# Patient Record
Sex: FEMALE | Race: WHITE | NOT HISPANIC OR LATINO
[De-identification: names, ages, dates, MRNs, and addresses within clinical notes are randomized per-mention and may not be internally consistent; named-entity substitution may affect disease eponyms.]

---

## 2017-12-07 ENCOUNTER — APPOINTMENT (OUTPATIENT)
Dept: PULMONOLOGY | Facility: CLINIC | Age: 82
End: 2017-12-07
Payer: MEDICARE

## 2017-12-07 VITALS
HEART RATE: 73 BPM | WEIGHT: 150 LBS | TEMPERATURE: 97.9 F | SYSTOLIC BLOOD PRESSURE: 136 MMHG | DIASTOLIC BLOOD PRESSURE: 74 MMHG | OXYGEN SATURATION: 100 % | BODY MASS INDEX: 25.75 KG/M2

## 2017-12-07 DIAGNOSIS — R06.02 SHORTNESS OF BREATH: ICD-10-CM

## 2017-12-07 PROCEDURE — 99214 OFFICE O/P EST MOD 30 MIN: CPT | Mod: 25

## 2017-12-07 PROCEDURE — 94727 GAS DIL/WSHOT DETER LNG VOL: CPT

## 2017-12-07 PROCEDURE — 94060 EVALUATION OF WHEEZING: CPT

## 2017-12-07 PROCEDURE — 94729 DIFFUSING CAPACITY: CPT

## 2017-12-07 RX ORDER — ROSUVASTATIN CALCIUM 5 MG/1
5 TABLET, FILM COATED ORAL
Qty: 90 | Refills: 0 | Status: ACTIVE | COMMUNITY
Start: 2017-03-09

## 2017-12-13 ENCOUNTER — APPOINTMENT (OUTPATIENT)
Dept: THORACIC SURGERY | Facility: CLINIC | Age: 82
End: 2017-12-13
Payer: MEDICARE

## 2017-12-13 VITALS
HEART RATE: 63 BPM | SYSTOLIC BLOOD PRESSURE: 118 MMHG | DIASTOLIC BLOOD PRESSURE: 73 MMHG | OXYGEN SATURATION: 97 % | RESPIRATION RATE: 18 BRPM

## 2017-12-13 PROCEDURE — 99215 OFFICE O/P EST HI 40 MIN: CPT

## 2018-01-10 ENCOUNTER — OUTPATIENT (OUTPATIENT)
Dept: OUTPATIENT SERVICES | Facility: HOSPITAL | Age: 83
LOS: 1 days | End: 2018-01-10
Payer: MEDICARE

## 2018-01-10 VITALS
WEIGHT: 147.93 LBS | RESPIRATION RATE: 16 BRPM | HEART RATE: 57 BPM | DIASTOLIC BLOOD PRESSURE: 78 MMHG | OXYGEN SATURATION: 99 % | SYSTOLIC BLOOD PRESSURE: 132 MMHG | HEIGHT: 63 IN | TEMPERATURE: 97 F

## 2018-01-10 DIAGNOSIS — R73.03 PREDIABETES: ICD-10-CM

## 2018-01-10 DIAGNOSIS — Z98.890 OTHER SPECIFIED POSTPROCEDURAL STATES: Chronic | ICD-10-CM

## 2018-01-10 DIAGNOSIS — I10 ESSENTIAL (PRIMARY) HYPERTENSION: ICD-10-CM

## 2018-01-10 DIAGNOSIS — R91.1 SOLITARY PULMONARY NODULE: ICD-10-CM

## 2018-01-10 DIAGNOSIS — Z98.49 CATARACT EXTRACTION STATUS, UNSPECIFIED EYE: Chronic | ICD-10-CM

## 2018-01-10 DIAGNOSIS — F03.90 UNSPECIFIED DEMENTIA WITHOUT BEHAVIORAL DISTURBANCE: ICD-10-CM

## 2018-01-10 DIAGNOSIS — Z90.2 ACQUIRED ABSENCE OF LUNG [PART OF]: Chronic | ICD-10-CM

## 2018-01-10 LAB
ALBUMIN SERPL ELPH-MCNC: 4.1 G/DL — SIGNIFICANT CHANGE UP (ref 3.3–5)
ALP SERPL-CCNC: 99 U/L — SIGNIFICANT CHANGE UP (ref 40–120)
ALT FLD-CCNC: 31 U/L — SIGNIFICANT CHANGE UP (ref 4–33)
AST SERPL-CCNC: 26 U/L — SIGNIFICANT CHANGE UP (ref 4–32)
BILIRUB SERPL-MCNC: 0.4 MG/DL — SIGNIFICANT CHANGE UP (ref 0.2–1.2)
BLD GP AB SCN SERPL QL: NEGATIVE — SIGNIFICANT CHANGE UP
BUN SERPL-MCNC: 25 MG/DL — HIGH (ref 7–23)
CALCIUM SERPL-MCNC: 9.9 MG/DL — SIGNIFICANT CHANGE UP (ref 8.4–10.5)
CHLORIDE SERPL-SCNC: 103 MMOL/L — SIGNIFICANT CHANGE UP (ref 98–107)
CO2 SERPL-SCNC: 26 MMOL/L — SIGNIFICANT CHANGE UP (ref 22–31)
CREAT SERPL-MCNC: 0.86 MG/DL — SIGNIFICANT CHANGE UP (ref 0.5–1.3)
GLUCOSE SERPL-MCNC: 81 MG/DL — SIGNIFICANT CHANGE UP (ref 70–99)
HBA1C BLD-MCNC: 5.9 % — HIGH (ref 4–5.6)
HCT VFR BLD CALC: 43 % — SIGNIFICANT CHANGE UP (ref 34.5–45)
HGB BLD-MCNC: 14.5 G/DL — SIGNIFICANT CHANGE UP (ref 11.5–15.5)
MCHC RBC-ENTMCNC: 33.7 % — SIGNIFICANT CHANGE UP (ref 32–36)
MCHC RBC-ENTMCNC: 33.7 PG — SIGNIFICANT CHANGE UP (ref 27–34)
MCV RBC AUTO: 100 FL — SIGNIFICANT CHANGE UP (ref 80–100)
NRBC # FLD: 0 — SIGNIFICANT CHANGE UP
PLATELET # BLD AUTO: 174 K/UL — SIGNIFICANT CHANGE UP (ref 150–400)
PMV BLD: 10.9 FL — SIGNIFICANT CHANGE UP (ref 7–13)
POTASSIUM SERPL-MCNC: 3.8 MMOL/L — SIGNIFICANT CHANGE UP (ref 3.5–5.3)
POTASSIUM SERPL-SCNC: 3.8 MMOL/L — SIGNIFICANT CHANGE UP (ref 3.5–5.3)
PROT SERPL-MCNC: 7.1 G/DL — SIGNIFICANT CHANGE UP (ref 6–8.3)
RBC # BLD: 4.3 M/UL — SIGNIFICANT CHANGE UP (ref 3.8–5.2)
RBC # FLD: 12.9 % — SIGNIFICANT CHANGE UP (ref 10.3–14.5)
RH IG SCN BLD-IMP: POSITIVE — SIGNIFICANT CHANGE UP
SODIUM SERPL-SCNC: 141 MMOL/L — SIGNIFICANT CHANGE UP (ref 135–145)
WBC # BLD: 5.28 K/UL — SIGNIFICANT CHANGE UP (ref 3.8–10.5)
WBC # FLD AUTO: 5.28 K/UL — SIGNIFICANT CHANGE UP (ref 3.8–10.5)

## 2018-01-10 PROCEDURE — 93010 ELECTROCARDIOGRAM REPORT: CPT

## 2018-01-10 RX ORDER — SODIUM CHLORIDE 9 MG/ML
1000 INJECTION, SOLUTION INTRAVENOUS
Qty: 0 | Refills: 0 | Status: DISCONTINUED | OUTPATIENT
Start: 2018-01-17 | End: 2018-01-18

## 2018-01-10 NOTE — H&P PST ADULT - NSANTHOSAYNRD_GEN_A_CORE
No. LAURY screening performed.  STOP BANG Legend: 0-2 = LOW Risk; 3-4 = INTERMEDIATE Risk; 5-8 = HIGH Risk

## 2018-01-10 NOTE — H&P PST ADULT - PROBLEM SELECTOR PLAN 3
Instructed to take amlodipine AM of surgery with a sip of water. Patient was evaluated by cardiologist. Will obtain cardiac evaluation from Dr. Mercer.

## 2018-01-10 NOTE — H&P PST ADULT - PSH
History of back surgery  1990s  History of cataract surgery  bilateral  S/P lobectomy of lung  s/p FB, mediastinoscopy, LT VATS, COLEMAN lobectomy 8/2009

## 2018-01-10 NOTE — H&P PST ADULT - PROBLEM SELECTOR PLAN 1
Scheduled for right video assisted thoracoscopy, robotic assisted right upper lobe wedge resection, possible right upper lobe segmentectomy, mediastinal lymph node dissection on 1/17/2018. labs done and results pending. Surgical scrub & preop instruction given and explained. Famotidine provided with instruction. Verbalized understanding.

## 2018-01-10 NOTE — H&P PST ADULT - PSYCHIATRIC DETAILS
Skin normal color for race, warm, dry and intact. erythematous non tender rash poster neck crease and bilateral antecubital arm crease non rasied  no lesions normal affect/normal behavior

## 2018-01-10 NOTE — H&P PST ADULT - PROBLEM SELECTOR PLAN 4
Patient accompanied by son Feliz Oneil. Surgical scrub & preop instruction given and explained to son. Verbalized understanding.

## 2018-01-10 NOTE — H&P PST ADULT - NEGATIVE MUSCULOSKELETAL SYMPTOMS
no stiffness/no neck pain/no muscle weakness/no back pain no joint swelling/no back pain/no stiffness/no neck pain/no muscle weakness

## 2018-01-10 NOTE — H&P PST ADULT - NEGATIVE GENERAL GENITOURINARY SYMPTOMS
no hematuria/no bladder infections/no dysuria/no flank pain R/no flank pain L/no urinary hesitancy/normal urinary frequency

## 2018-01-10 NOTE — H&P PST ADULT - ASSESSMENT
84 yo female with preop dx of malignant neoplasm of unspecified part of unspecified bronchus or lung, solitary pulmonary nodule

## 2018-01-10 NOTE — H&P PST ADULT - NEGATIVE ALLERGY TYPES
no reactions to food/no reactions to medicines/no outdoor environmental allergies/no indoor environmental allergies

## 2018-01-10 NOTE — H&P PST ADULT - HISTORY OF PRESENT ILLNESS
84 yo female with hx of HTN, HLD, dementia, lung CA presents to have PST evaluation for right video assisted thoracoscopy, robotic assisted right upper lobe wedge resection, possible right upper lobe segmentectomy, mediastinal lymph node dissection on 1/17/2018.  Patient reports hx of of left lung CA & right lung nodules, had TOPHER . Patient had f/u CT scan every 6 months, recent CT scan in 12/2017, showed right nodules increased in size, surgical intervention was recommended. 86 yo female with hx of HTN, HLD, dementia, lung CA presents to have PST evaluation for right video assisted thoracoscopy, robotic assisted right upper lobe wedge resection, possible right upper lobe segmentectomy, mediastinal lymph node dissection on 1/17/2018.  As per patient's son, patient has hx of of left lung CA (s/p L VATS, COLEMAN lobectomy 2009) & right lung nodules.  Patient had f/u CT scan every 6 months, recent CT scan in 12/2017, showed right nodules increased in size, surgical intervention was recommended.   Patient has dementia, accompanied by son.

## 2018-01-10 NOTE — H&P PST ADULT - PMH
Dementia    Hyperlipidemia    Hypertension    Lung cancer  left  Lung nodule  right  Prediabetes Dementia    Hyperlipidemia    Hypertension    Lung cancer  left  Lung nodule  right  Prediabetes  no med

## 2018-01-10 NOTE — H&P PST ADULT - NEGATIVE ENMT SYMPTOMS
no post-nasal discharge/no sinus symptoms/no nasal congestion/no nasal obstruction/no nasal discharge/no throat pain/no dysphagia/no tinnitus/no hearing difficulty/no ear pain

## 2018-01-10 NOTE — H&P PST ADULT - RESPIRATORY COMMENTS
preop dx of malignant neoplasm of unspecified part of unspecified bronchus or lung, solitary pulmonary nodule

## 2018-01-17 ENCOUNTER — APPOINTMENT (OUTPATIENT)
Dept: THORACIC SURGERY | Facility: HOSPITAL | Age: 83
End: 2018-01-17

## 2018-01-17 ENCOUNTER — INPATIENT (INPATIENT)
Facility: HOSPITAL | Age: 83
LOS: 1 days | Discharge: HOME HEALTH | End: 2018-01-19
Attending: THORACIC SURGERY (CARDIOTHORACIC VASCULAR SURGERY) | Admitting: THORACIC SURGERY (CARDIOTHORACIC VASCULAR SURGERY)
Payer: MEDICARE

## 2018-01-17 ENCOUNTER — RESULT REVIEW (OUTPATIENT)
Age: 83
End: 2018-01-17

## 2018-01-17 ENCOUNTER — TRANSCRIPTION ENCOUNTER (OUTPATIENT)
Age: 83
End: 2018-01-17

## 2018-01-17 VITALS
HEIGHT: 63 IN | WEIGHT: 147.93 LBS | TEMPERATURE: 98 F | DIASTOLIC BLOOD PRESSURE: 62 MMHG | RESPIRATION RATE: 16 BRPM | OXYGEN SATURATION: 99 % | SYSTOLIC BLOOD PRESSURE: 143 MMHG | HEART RATE: 57 BPM

## 2018-01-17 DIAGNOSIS — Z90.2 ACQUIRED ABSENCE OF LUNG [PART OF]: Chronic | ICD-10-CM

## 2018-01-17 DIAGNOSIS — Z98.890 OTHER SPECIFIED POSTPROCEDURAL STATES: Chronic | ICD-10-CM

## 2018-01-17 DIAGNOSIS — R91.1 SOLITARY PULMONARY NODULE: ICD-10-CM

## 2018-01-17 DIAGNOSIS — Z98.49 CATARACT EXTRACTION STATUS, UNSPECIFIED EYE: Chronic | ICD-10-CM

## 2018-01-17 LAB — RH IG SCN BLD-IMP: POSITIVE — SIGNIFICANT CHANGE UP

## 2018-01-17 PROCEDURE — 88332 PATH CONSLTJ SURG EA ADD BLK: CPT | Mod: 26

## 2018-01-17 PROCEDURE — 88342 IMHCHEM/IMCYTCHM 1ST ANTB: CPT | Mod: 26

## 2018-01-17 PROCEDURE — 71045 X-RAY EXAM CHEST 1 VIEW: CPT | Mod: 26

## 2018-01-17 PROCEDURE — 32652 THORACOSCOPY REM TOTL CORTEX: CPT | Mod: AS

## 2018-01-17 PROCEDURE — 88307 TISSUE EXAM BY PATHOLOGIST: CPT | Mod: 26

## 2018-01-17 PROCEDURE — 31622 DX BRONCHOSCOPE/WASH: CPT

## 2018-01-17 PROCEDURE — 32674 THORACOSCOPY LYMPH NODE EXC: CPT

## 2018-01-17 PROCEDURE — 99233 SBSQ HOSP IP/OBS HIGH 50: CPT

## 2018-01-17 PROCEDURE — 32666 THORACOSCOPY W/WEDGE RESECT: CPT | Mod: AS

## 2018-01-17 PROCEDURE — 32674 THORACOSCOPY LYMPH NODE EXC: CPT | Mod: AS

## 2018-01-17 PROCEDURE — 32666 THORACOSCOPY W/WEDGE RESECT: CPT

## 2018-01-17 PROCEDURE — 32652 THORACOSCOPY REM TOTL CORTEX: CPT

## 2018-01-17 PROCEDURE — 88331 PATH CONSLTJ SURG 1 BLK 1SPC: CPT | Mod: 26

## 2018-01-17 PROCEDURE — S2900 ROBOTIC SURGICAL SYSTEM: CPT | Mod: NC

## 2018-01-17 PROCEDURE — 88341 IMHCHEM/IMCYTCHM EA ADD ANTB: CPT | Mod: 26

## 2018-01-17 PROCEDURE — 88305 TISSUE EXAM BY PATHOLOGIST: CPT | Mod: 26

## 2018-01-17 PROCEDURE — 88313 SPECIAL STAINS GROUP 2: CPT | Mod: 26

## 2018-01-17 RX ORDER — HYDROMORPHONE HYDROCHLORIDE 2 MG/ML
30 INJECTION INTRAMUSCULAR; INTRAVENOUS; SUBCUTANEOUS
Qty: 0 | Refills: 0 | Status: DISCONTINUED | OUTPATIENT
Start: 2018-01-17 | End: 2018-01-18

## 2018-01-17 RX ORDER — DOCUSATE SODIUM 100 MG
100 CAPSULE ORAL THREE TIMES A DAY
Qty: 0 | Refills: 0 | Status: DISCONTINUED | OUTPATIENT
Start: 2018-01-17 | End: 2018-01-19

## 2018-01-17 RX ORDER — ONDANSETRON 8 MG/1
4 TABLET, FILM COATED ORAL EVERY 6 HOURS
Qty: 0 | Refills: 0 | Status: DISCONTINUED | OUTPATIENT
Start: 2018-01-17 | End: 2018-01-18

## 2018-01-17 RX ORDER — ACETAMINOPHEN 500 MG
1000 TABLET ORAL ONCE
Qty: 0 | Refills: 0 | Status: COMPLETED | OUTPATIENT
Start: 2018-01-17 | End: 2018-01-17

## 2018-01-17 RX ORDER — NALOXONE HYDROCHLORIDE 4 MG/.1ML
0.1 SPRAY NASAL
Qty: 0 | Refills: 0 | Status: DISCONTINUED | OUTPATIENT
Start: 2018-01-17 | End: 2018-01-18

## 2018-01-17 RX ORDER — HEPARIN SODIUM 5000 [USP'U]/ML
5000 INJECTION INTRAVENOUS; SUBCUTANEOUS ONCE
Qty: 0 | Refills: 0 | Status: COMPLETED | OUTPATIENT
Start: 2018-01-17 | End: 2018-01-17

## 2018-01-17 RX ORDER — HYDROMORPHONE HYDROCHLORIDE 2 MG/ML
0.5 INJECTION INTRAMUSCULAR; INTRAVENOUS; SUBCUTANEOUS
Qty: 0 | Refills: 0 | Status: DISCONTINUED | OUTPATIENT
Start: 2018-01-17 | End: 2018-01-18

## 2018-01-17 RX ORDER — METOCLOPRAMIDE HCL 10 MG
10 TABLET ORAL EVERY 8 HOURS
Qty: 0 | Refills: 0 | Status: DISCONTINUED | OUTPATIENT
Start: 2018-01-17 | End: 2018-01-19

## 2018-01-17 RX ORDER — ACETAMINOPHEN 500 MG
1000 TABLET ORAL ONCE
Qty: 0 | Refills: 0 | Status: COMPLETED | OUTPATIENT
Start: 2018-01-18 | End: 2018-01-18

## 2018-01-17 RX ORDER — FAMOTIDINE 10 MG/ML
20 INJECTION INTRAVENOUS EVERY 12 HOURS
Qty: 0 | Refills: 0 | Status: DISCONTINUED | OUTPATIENT
Start: 2018-01-17 | End: 2018-01-19

## 2018-01-17 RX ORDER — SENNA PLUS 8.6 MG/1
2 TABLET ORAL AT BEDTIME
Qty: 0 | Refills: 0 | Status: DISCONTINUED | OUTPATIENT
Start: 2018-01-17 | End: 2018-01-19

## 2018-01-17 RX ORDER — ASPIRIN/CALCIUM CARB/MAGNESIUM 324 MG
81 TABLET ORAL DAILY
Qty: 0 | Refills: 0 | Status: DISCONTINUED | OUTPATIENT
Start: 2018-01-18 | End: 2018-01-19

## 2018-01-17 RX ORDER — ATORVASTATIN CALCIUM 80 MG/1
20 TABLET, FILM COATED ORAL AT BEDTIME
Qty: 0 | Refills: 0 | Status: DISCONTINUED | OUTPATIENT
Start: 2018-01-17 | End: 2018-01-19

## 2018-01-17 RX ORDER — CHOLECALCIFEROL (VITAMIN D3) 125 MCG
1000 CAPSULE ORAL
Qty: 0 | Refills: 0 | Status: DISCONTINUED | OUTPATIENT
Start: 2018-01-17 | End: 2018-01-19

## 2018-01-17 RX ORDER — HEPARIN SODIUM 5000 [USP'U]/ML
5000 INJECTION INTRAVENOUS; SUBCUTANEOUS EVERY 8 HOURS
Qty: 0 | Refills: 0 | Status: DISCONTINUED | OUTPATIENT
Start: 2018-01-17 | End: 2018-01-19

## 2018-01-17 RX ADMIN — HEPARIN SODIUM 5000 UNIT(S): 5000 INJECTION INTRAVENOUS; SUBCUTANEOUS at 14:56

## 2018-01-17 RX ADMIN — ONDANSETRON 4 MILLIGRAM(S): 8 TABLET, FILM COATED ORAL at 17:00

## 2018-01-17 RX ADMIN — HYDROMORPHONE HYDROCHLORIDE 30 MILLILITER(S): 2 INJECTION INTRAMUSCULAR; INTRAVENOUS; SUBCUTANEOUS at 12:00

## 2018-01-17 RX ADMIN — SODIUM CHLORIDE 30 MILLILITER(S): 9 INJECTION, SOLUTION INTRAVENOUS at 12:00

## 2018-01-17 RX ADMIN — HEPARIN SODIUM 5000 UNIT(S): 5000 INJECTION INTRAVENOUS; SUBCUTANEOUS at 21:39

## 2018-01-17 RX ADMIN — SODIUM CHLORIDE 30 MILLILITER(S): 9 INJECTION, SOLUTION INTRAVENOUS at 07:57

## 2018-01-17 RX ADMIN — Medication 100 MILLIGRAM(S): at 14:56

## 2018-01-17 RX ADMIN — Medication 1000 MILLIGRAM(S): at 19:45

## 2018-01-17 RX ADMIN — Medication 100 MILLIGRAM(S): at 21:40

## 2018-01-17 RX ADMIN — Medication 10 MILLIGRAM(S): at 18:08

## 2018-01-17 RX ADMIN — HYDROMORPHONE HYDROCHLORIDE 30 MILLILITER(S): 2 INJECTION INTRAMUSCULAR; INTRAVENOUS; SUBCUTANEOUS at 19:08

## 2018-01-17 RX ADMIN — ATORVASTATIN CALCIUM 20 MILLIGRAM(S): 80 TABLET, FILM COATED ORAL at 21:39

## 2018-01-17 RX ADMIN — Medication 1000 UNIT(S): at 18:09

## 2018-01-17 RX ADMIN — SODIUM CHLORIDE 30 MILLILITER(S): 9 INJECTION, SOLUTION INTRAVENOUS at 19:08

## 2018-01-17 RX ADMIN — FAMOTIDINE 20 MILLIGRAM(S): 10 INJECTION INTRAVENOUS at 18:09

## 2018-01-17 RX ADMIN — HEPARIN SODIUM 5000 UNIT(S): 5000 INJECTION INTRAVENOUS; SUBCUTANEOUS at 07:57

## 2018-01-17 RX ADMIN — Medication 400 MILLIGRAM(S): at 19:30

## 2018-01-17 RX ADMIN — SENNA PLUS 2 TABLET(S): 8.6 TABLET ORAL at 21:39

## 2018-01-17 NOTE — PATIENT PROFILE ADULT. - NS PRO REFERRAL CMGT
Significantly altered cognitive/functional ability/Pt has undiagnosed dementia, lives with huband with hx of stroke (right sided weakness), son is caretaker (does shopping), lives upstairs.

## 2018-01-17 NOTE — BRIEF OPERATIVE NOTE - PROCEDURE
<<-----Click on this checkbox to enter Procedure Robotic assistance in thoracoscopic procedure  01/17/2018  Robotic RVATS, RUL wedge, MLND  Active  [email or web address]

## 2018-01-17 NOTE — ASU PATIENT PROFILE, ADULT - PMH
Dementia    Hyperlipidemia    Hypertension    Lung cancer  left  Lung nodule  right  Prediabetes  no med

## 2018-01-17 NOTE — PROGRESS NOTE ADULT - SUBJECTIVE AND OBJECTIVE BOX
ALEXANDRA ZUNIGA            MRN-[id number]         No Known Allergies                 HPI:  84 yo female with hx of HTN, HLD, dementia, lung CA presents to have PST evaluation for right video assisted thoracoscopy, robotic assisted right upper lobe wedge resection, possible right upper lobe segmentectomy, mediastinal lymph node dissection on 1/17/2018.  As per patient's son, patient has hx of of left lung CA (s/p L VATS, COLEMAN lobectomy 2009) & right lung nodules.  Patient had f/u CT scan every 6 months, recent CT scan in 12/2017, showed right nodules increased in size, surgical intervention was recommended.   Patient has dementia, accompanied by son. (10 Alvino 2018 09:35)      Procedure: Robotic RVATS, RUL wedge, MLND   POD# 0    Issues:  Lung nodule  Postop pain  Dementia  HTN  HLD               Home Medications:  amLODIPine 5 mg oral tablet: 1 tab(s) orally once a day in am (17 Jan 2018 07:32)  aspirin 81 mg oral tablet: 1 tab(s) orally once a day in am  last dose 1/10 (17 Jan 2018 07:32)  Crestor 5 mg oral tablet: 1 tab(s) orally once a day (at bedtime) (17 Jan 2018 07:32)  losartan 50 mg oral tablet: 1 tab(s) orally once a day in pm (17 Jan 2018 07:32)  Vitamin D3 1000 intl units oral capsule: 1 cap(s) orally 2 times a day (17 Jan 2018 07:32)      PAST MEDICAL & SURGICAL HISTORY:  Prediabetes: no med  Lung nodule: right  Lung cancer: left  Dementia  Hypertension  Hyperlipidemia  History of cataract surgery: bilateral  History of back surgery: 1990s  S/P lobectomy of lung: s/p FB, mediastinoscopy, LT VATS, COLEMAN lobectomy 8/2009        ICU Vital Signs Last 24 Hrs  T(C): 36.5 (17 Jan 2018 12:00), Max: 36.5 (17 Jan 2018 07:11)  T(F): 97.7 (17 Jan 2018 12:00), Max: 97.7 (17 Jan 2018 07:11)  HR: 53 (17 Jan 2018 14:00) (43 - 60)  BP: 138/56 (17 Jan 2018 14:00) (133/49 - 146/50)  BP(mean): 77 (17 Jan 2018 14:00) (70 - 77)  ABP: 153/55 (17 Jan 2018 14:00) (144/51 - 156/59)  ABP(mean): 92 (17 Jan 2018 14:00) (84 - 96)  RR: 15 (17 Jan 2018 14:00) (15 - 20)  SpO2: 100% (17 Jan 2018 14:00) (99% - 100%)    I&O's Detail    17 Jan 2018 07:01  -  17 Jan 2018 14:41  --------------------------------------------------------  IN:    lactated ringers.: 120 mL  Total IN: 120 mL    OUT:    Chest Tube: 25 mL    Indwelling Catheter - Urethral: 100 mL  Total OUT: 125 mL    Total NET: -5 mL        CAPILLARY BLOOD GLUCOSE      POCT Blood Glucose.: 73 mg/dL (17 Jan 2018 08:16)      Home Medications:  amLODIPine 5 mg oral tablet: 1 tab(s) orally once a day in am (17 Jan 2018 07:32)  aspirin 81 mg oral tablet: 1 tab(s) orally once a day in am  last dose 1/10 (17 Jan 2018 07:32)  Crestor 5 mg oral tablet: 1 tab(s) orally once a day (at bedtime) (17 Jan 2018 07:32)  losartan 50 mg oral tablet: 1 tab(s) orally once a day in pm (17 Jan 2018 07:32)  Vitamin D3 1000 intl units oral capsule: 1 cap(s) orally 2 times a day (17 Jan 2018 07:32)      MEDICATIONS  (STANDING):  atorvastatin 20 milliGRAM(s) Oral at bedtime  cholecalciferol 1000 Unit(s) Oral two times a day  docusate sodium 100 milliGRAM(s) Oral three times a day  famotidine    Tablet 20 milliGRAM(s) Oral every 12 hours  heparin  Injectable 5000 Unit(s) SubCutaneous every 8 hours  HYDROmorphone PCA (1 mG/mL) 30 milliLiter(s) PCA Continuous PCA Continuous  lactated ringers. 1000 milliLiter(s) (30 mL/Hr) IV Continuous <Continuous>  senna 2 Tablet(s) Oral at bedtime    MEDICATIONS  (PRN):  HYDROmorphone PCA (1 mG/mL) Rescue Clinician Bolus 0.5 milliGRAM(s) IV Push every 15 minutes PRN for Pain Scale GREATER THAN 6  naloxone Injectable 0.1 milliGRAM(s) IV Push every 3 minutes PRN For ANY of the following changes in patient status:  A. RR LESS THAN 10 breaths per minute, B. Oxygen saturation LESS THAN 90%, C. Sedation score of 6  ondansetron Injectable 4 milliGRAM(s) IV Push every 6 hours PRN Nausea      Physical exam:                             General:               Pt is awake, alert, answering questions, not in any distress                                                  Neuro:                  Nonfocal                             Cardiovascular:   S1 & S2, regular                           Respiratory:         Air entry is fair and equal on both sides, has bilateral conducted sounds                           GI:                          Soft, nondistended and nontender, Bowel sounds active                            Ext:                        No cyanosis or edema     Labs:                                                               CXR:    Rt chest tube in place. Lungs are clear    Plan:    General: 85yFemale s/p Robotic RVATS, RUL wedge, MLND  POD# 0,  progressing well, experiencing  pain with deep breathing.                             Neuro:                                         Pain control with PCA / Tylenol IV                            Cardiovascular:                                          Continue hemodynamic monitoring.    HTN: Restart Norvasc and Losartan    HLD: On Zocar                            Respiratory:                                         Pt is on 2L  nasal canula,                                           Comfortable, not in any distress.                                          Using incentive spirometry                                          Monitor chest tube output                                         Chest tube to suction                                                                 Continue bronchodilators, pulmonary toilet                            GI                                         On clear liquids                                         Continue GI prophylaxis with Pepcid                                          Continue Zofran / Reglan for nausea - PRN	                                                                 Renal:                                         Continue LR 30cc/hr                                         Monitor I/Os and electrolytes                                         D/C Heller in AM                                                 Hem/ Onc:                                                                                  Monitor chest tube output &  signs of bleeding.                                          Follow CBC in AM                           Infectious disease:                                            No signs of infection. Monitor for fever / leukocytosis.                                          All surgical incision / chest tube  sites look clean                            Endocrine                                             Continue Accu-Checks with coverage    Pt is on SQ Heparin and Venodyne boots for DVT prophylaxis.     Pertinent clinical, laboratory, radiographic, hemodynamic, echocardiographic, respiratory data, microbiologic data and chart were reviewed and analyzed frequently throughout the course of the day and night  Patient seen, examined and plan discussed with CT Surgeon / CTICU team during rounds.    Pt's status discussed with family at bedside, updated status           Baldev Hilton MD

## 2018-01-18 ENCOUNTER — TRANSCRIPTION ENCOUNTER (OUTPATIENT)
Age: 83
End: 2018-01-18

## 2018-01-18 LAB
BUN SERPL-MCNC: 13 MG/DL — SIGNIFICANT CHANGE UP (ref 7–23)
BUN SERPL-MCNC: 18 MG/DL — SIGNIFICANT CHANGE UP (ref 7–23)
CALCIUM SERPL-MCNC: 5.4 MG/DL — CRITICAL LOW (ref 8.4–10.5)
CALCIUM SERPL-MCNC: 9 MG/DL — SIGNIFICANT CHANGE UP (ref 8.4–10.5)
CHLORIDE SERPL-SCNC: 103 MMOL/L — SIGNIFICANT CHANGE UP (ref 98–107)
CHLORIDE SERPL-SCNC: 118 MMOL/L — HIGH (ref 98–107)
CO2 SERPL-SCNC: 18 MMOL/L — LOW (ref 22–31)
CO2 SERPL-SCNC: 27 MMOL/L — SIGNIFICANT CHANGE UP (ref 22–31)
CREAT SERPL-MCNC: 0.38 MG/DL — LOW (ref 0.5–1.3)
CREAT SERPL-MCNC: 0.75 MG/DL — SIGNIFICANT CHANGE UP (ref 0.5–1.3)
GLUCOSE SERPL-MCNC: 111 MG/DL — HIGH (ref 70–99)
GLUCOSE SERPL-MCNC: 83 MG/DL — SIGNIFICANT CHANGE UP (ref 70–99)
HCT VFR BLD CALC: 36.8 % — SIGNIFICANT CHANGE UP (ref 34.5–45)
HGB BLD-MCNC: 12.4 G/DL — SIGNIFICANT CHANGE UP (ref 11.5–15.5)
MCHC RBC-ENTMCNC: 33.3 PG — SIGNIFICANT CHANGE UP (ref 27–34)
MCHC RBC-ENTMCNC: 33.7 % — SIGNIFICANT CHANGE UP (ref 32–36)
MCV RBC AUTO: 98.9 FL — SIGNIFICANT CHANGE UP (ref 80–100)
NRBC # FLD: 0 — SIGNIFICANT CHANGE UP
PLATELET # BLD AUTO: 168 K/UL — SIGNIFICANT CHANGE UP (ref 150–400)
PMV BLD: 10.5 FL — SIGNIFICANT CHANGE UP (ref 7–13)
POTASSIUM SERPL-MCNC: 2.5 MMOL/L — CRITICAL LOW (ref 3.5–5.3)
POTASSIUM SERPL-MCNC: 4.1 MMOL/L — SIGNIFICANT CHANGE UP (ref 3.5–5.3)
POTASSIUM SERPL-SCNC: 2.5 MMOL/L — CRITICAL LOW (ref 3.5–5.3)
POTASSIUM SERPL-SCNC: 4.1 MMOL/L — SIGNIFICANT CHANGE UP (ref 3.5–5.3)
RBC # BLD: 3.72 M/UL — LOW (ref 3.8–5.2)
RBC # FLD: 13 % — SIGNIFICANT CHANGE UP (ref 10.3–14.5)
SODIUM SERPL-SCNC: 137 MMOL/L — SIGNIFICANT CHANGE UP (ref 135–145)
SODIUM SERPL-SCNC: 143 MMOL/L — SIGNIFICANT CHANGE UP (ref 135–145)
WBC # BLD: 10.21 K/UL — SIGNIFICANT CHANGE UP (ref 3.8–10.5)
WBC # FLD AUTO: 10.21 K/UL — SIGNIFICANT CHANGE UP (ref 3.8–10.5)

## 2018-01-18 PROCEDURE — 71045 X-RAY EXAM CHEST 1 VIEW: CPT | Mod: 26

## 2018-01-18 PROCEDURE — 71045 X-RAY EXAM CHEST 1 VIEW: CPT | Mod: 26,77

## 2018-01-18 PROCEDURE — 99233 SBSQ HOSP IP/OBS HIGH 50: CPT

## 2018-01-18 RX ORDER — POTASSIUM CHLORIDE 20 MEQ
10 PACKET (EA) ORAL ONCE
Qty: 0 | Refills: 0 | Status: COMPLETED | OUTPATIENT
Start: 2018-01-18 | End: 2018-01-18

## 2018-01-18 RX ORDER — OXYCODONE HYDROCHLORIDE 5 MG/1
5 TABLET ORAL
Qty: 0 | Refills: 0 | Status: DISCONTINUED | OUTPATIENT
Start: 2018-01-18 | End: 2018-01-19

## 2018-01-18 RX ORDER — ACETAMINOPHEN 500 MG
650 TABLET ORAL EVERY 6 HOURS
Qty: 0 | Refills: 0 | Status: DISCONTINUED | OUTPATIENT
Start: 2018-01-18 | End: 2018-01-19

## 2018-01-18 RX ADMIN — FAMOTIDINE 20 MILLIGRAM(S): 10 INJECTION INTRAVENOUS at 06:12

## 2018-01-18 RX ADMIN — ATORVASTATIN CALCIUM 20 MILLIGRAM(S): 80 TABLET, FILM COATED ORAL at 21:35

## 2018-01-18 RX ADMIN — Medication 400 MILLIGRAM(S): at 03:00

## 2018-01-18 RX ADMIN — SODIUM CHLORIDE 30 MILLILITER(S): 9 INJECTION, SOLUTION INTRAVENOUS at 08:00

## 2018-01-18 RX ADMIN — Medication 650 MILLIGRAM(S): at 11:30

## 2018-01-18 RX ADMIN — Medication 1000 UNIT(S): at 06:12

## 2018-01-18 RX ADMIN — HEPARIN SODIUM 5000 UNIT(S): 5000 INJECTION INTRAVENOUS; SUBCUTANEOUS at 21:35

## 2018-01-18 RX ADMIN — Medication 100 MILLIGRAM(S): at 21:35

## 2018-01-18 RX ADMIN — Medication 650 MILLIGRAM(S): at 23:33

## 2018-01-18 RX ADMIN — Medication 81 MILLIGRAM(S): at 11:22

## 2018-01-18 RX ADMIN — Medication 100 MILLIEQUIVALENT(S): at 06:02

## 2018-01-18 RX ADMIN — HYDROMORPHONE HYDROCHLORIDE 30 MILLILITER(S): 2 INJECTION INTRAMUSCULAR; INTRAVENOUS; SUBCUTANEOUS at 07:32

## 2018-01-18 RX ADMIN — Medication 100 MILLIGRAM(S): at 14:40

## 2018-01-18 RX ADMIN — Medication 100 MILLIGRAM(S): at 06:12

## 2018-01-18 RX ADMIN — FAMOTIDINE 20 MILLIGRAM(S): 10 INJECTION INTRAVENOUS at 17:51

## 2018-01-18 RX ADMIN — SENNA PLUS 2 TABLET(S): 8.6 TABLET ORAL at 21:35

## 2018-01-18 RX ADMIN — Medication 1000 UNIT(S): at 21:35

## 2018-01-18 RX ADMIN — Medication 650 MILLIGRAM(S): at 12:00

## 2018-01-18 RX ADMIN — HEPARIN SODIUM 5000 UNIT(S): 5000 INJECTION INTRAVENOUS; SUBCUTANEOUS at 14:40

## 2018-01-18 RX ADMIN — Medication 1000 MILLIGRAM(S): at 03:15

## 2018-01-18 RX ADMIN — HEPARIN SODIUM 5000 UNIT(S): 5000 INJECTION INTRAVENOUS; SUBCUTANEOUS at 06:12

## 2018-01-18 RX ADMIN — Medication 650 MILLIGRAM(S): at 17:51

## 2018-01-18 NOTE — DISCHARGE NOTE ADULT - ADDITIONAL INSTRUCTIONS
Dr Bright in 1 to 2 weeks; See your PCP as well; keep the wound clean and dry and monitor for sign of infection (redness, pus, fever) and if noted, call Dr Bright.  Call Dr Bright for any abnormal respiratory issues as well. Dr Bright in  2 weeks; See your PCP as well; keep the wound clean and dry and monitor for sign of infection (redness, pus, fever) and if noted, call Dr Bright.

## 2018-01-18 NOTE — DISCHARGE NOTE ADULT - PATIENT PORTAL LINK FT
“You can access the [employer] Patient Portal, offered by [facility], by registering with the following website: [email or web address]”

## 2018-01-18 NOTE — PROGRESS NOTE ADULT - SUBJECTIVE AND OBJECTIVE BOX
ALEXANDRA ZUNIGA            MRN-[id number]         No Known Allergies             84 yo female with hx of HTN, HLD, dementia, lung CA presents to have PST evaluation for right video assisted thoracoscopy, robotic assisted right upper lobe wedge resection, possible right upper lobe segmentectomy, mediastinal lymph node dissection on 1/17/2018.  As per patient's son, patient has hx of of left lung CA (s/p L VATS, COLEMAN lobectomy 2009) & right lung nodules.  Patient had f/u CT scan every 6 months, recent CT scan in 12/2017, showed right nodules increased in size, surgical intervention was recommended.   Patient has dementia, accompanied by son. (10 Alvino 2018 09:35)      Procedure: Robotic RVATS, RUL wedge, MLND   POD# 1    Issues:  Lung nodule  Postop pain  Hypokalemia  Dementia  HTN  HLD      ICU Vital Signs Last 24 Hrs  T(C): 37.1 (18 Jan 2018 04:00), Max: 37.1 (18 Jan 2018 00:00)  T(F): 98.7 (18 Jan 2018 04:00), Max: 98.7 (18 Jan 2018 00:00)  HR: 65 (18 Jan 2018 06:00) (43 - 75)  BP: 122/52 (17 Jan 2018 19:00) (122/52 - 146/50)  BP(mean): 70 (17 Jan 2018 19:00) (66 - 77)  ABP: 143/47 (18 Jan 2018 06:00) (117/40 - 156/59)  ABP(mean): 82 (18 Jan 2018 06:00) (67 - 96)  RR: 17 (18 Jan 2018 06:00) (14 - 21)  SpO2: 94% (18 Jan 2018 06:00) (93% - 100%)      I&O's Summary    17 Jan 2018 07:01  -  18 Jan 2018 06:39  --------------------------------------------------------  IN: 870 mL / OUT: 725 mL / NET: 145 mL      CAPILLARY BLOOD GLUCOSE      POCT Blood Glucose.: 73 mg/dL (17 Jan 2018 08:16)      MEDICATIONS  (STANDING):  aspirin enteric coated 81 milliGRAM(s) Oral daily  atorvastatin 20 milliGRAM(s) Oral at bedtime  cholecalciferol 1000 Unit(s) Oral two times a day  docusate sodium 100 milliGRAM(s) Oral three times a day  famotidine    Tablet 20 milliGRAM(s) Oral every 12 hours  heparin  Injectable 5000 Unit(s) SubCutaneous every 8 hours  HYDROmorphone PCA (1 mG/mL) 30 milliLiter(s) PCA Continuous PCA Continuous  lactated ringers. 1000 milliLiter(s) (30 mL/Hr) IV Continuous <Continuous>  senna 2 Tablet(s) Oral at bedtime    MEDICATIONS  (PRN):  HYDROmorphone PCA (1 mG/mL) Rescue Clinician Bolus 0.5 milliGRAM(s) IV Push every 15 minutes PRN for Pain Scale GREATER THAN 6  metoclopramide Injectable 10 milliGRAM(s) IV Push every 8 hours PRN Nausea /Vomiting  naloxone Injectable 0.1 milliGRAM(s) IV Push every 3 minutes PRN For ANY of the following changes in patient status:  A. RR LESS THAN 10 breaths per minute, B. Oxygen saturation LESS THAN 90%, C. Sedation score of 6  ondansetron Injectable 4 milliGRAM(s) IV Push every 6 hours PRN Nausea      Physical exam:                                        General:               Pt is awake, alert, answering questions, not in any distress                                                  Neuro:                  Nonfocal                             Cardiovascular:   S1 & S2, regular                           Respiratory:         Air entry is fair and equal on both sides, has bilateral conducted sounds                           GI:                          Soft, nondistended and nontender, Bowel sounds active                            Ext:                        No cyanosis or edema                       Labs:                                                12.4   10.21 )-----------( 168      ( 18 Jan 2018 04:40 )             36.8                           01-18    143  |  118<H>  |  13  ----------------------------<  83  2.5<LL>   |  18<L>  |  0.38<L>    Ca    5.4<LL>      18 Jan 2018 04:40                                                                                    Plan:    General: 85yFemale s/p Robotic RVATS, RUL wedge, MLND  POD# 1,  progressing well, experiencing  pain with deep breathing.                             Neuro:                                         Pain control with Tylenol , d/c PCA                            Cardiovascular:                                          Continue hemodynamic monitoring.    HTN: Restart Norvasc and Losartan    HLD: On Zocar                            Respiratory:                                         Pt is on RA                                          Comfortable, not in any distress.                                          Using incentive spirometry                                          Monitor chest tube output                                         Chest tube to water seal - No air leak                                                                 Continue bronchodilators, pulmonary toilet                            GI                                         On DASH diet                                         Continue GI prophylaxis with Pepcid                                          Continue Zofran / Reglan for nausea - PRN	                                                                 Renal:                                         Hypokalemia - Replace K, repeat BMP     Continue LR 30cc/hr                                         Monitor I/Os and electrolytes                                         D/C Heller                                                  Hem/ Onc:                                                                                  Monitor chest tube output &  signs of bleeding.                                          Follow CBC in AM                           Infectious disease:                                            No signs of infection. Monitor for fever / leukocytosis.                                          All surgical incision / chest tube  sites look clean                            Endocrine                                             Continue Accu-Checks with coverage    Pt is on SQ Heparin and Venodyne boots for DVT prophylaxis.     Pertinent clinical, laboratory, radiographic, hemodynamic, echocardiographic, respiratory data, microbiologic data and chart were reviewed and analyzed frequently throughout the course of the day and night  Patient seen, examined and plan discussed with CT Surgeon / CTICU team during rounds.    Pt's status discussed with family at bedside, updated status        Baldev Hilton MD

## 2018-01-18 NOTE — DISCHARGE NOTE ADULT - NS AS ACTIVITY OBS
Walking-Indoors allowed/No Heavy lifting/straining/Sex allowed/Showering allowed/Driving allowed/Do not drive or operate machinery/Walking-Outdoors allowed/Stairs allowed

## 2018-01-18 NOTE — DISCHARGE NOTE ADULT - CARE PROVIDER_API CALL
Zackary Lee  Phone: [phone]  Fax: (   )    -    Bj Bright (MD), Surgery; Thoracic Surgery  [address] [place] [address]  Phone: [phone]  Fax: [phone]

## 2018-01-18 NOTE — PHYSICAL THERAPY INITIAL EVALUATION ADULT - MANUAL MUSCLE TESTING RESULTS, REHAB EVAL
shoulder muscle strength not formally assessed Bilateral UE muscle strength grossly 3/5 Throughout; Bilateral LE muscle strength grossly 3/5 Throughout.

## 2018-01-18 NOTE — PHYSICAL THERAPY INITIAL EVALUATION ADULT - ASR EQUIP NEEDS DISCH PT EVAL
pt. would benefit from use of Rolling Walker upon D/C to optimize safety within the home and decrease fall risk./rolling walker (5 inch wheels)

## 2018-01-18 NOTE — PHYSICAL THERAPY INITIAL EVALUATION ADULT - CRITERIA FOR SKILLED THERAPEUTIC INTERVENTIONS
impairments found/anticipated discharge recommendation/anticipated D/C to home with home PT services to address current functional limitations to optimize safety within the home environment./functional limitations in following categories/rehab potential

## 2018-01-18 NOTE — DISCHARGE NOTE ADULT - PLAN OF CARE
Wound healing; Follow up final pathology and subsequent treatment plan You may remove the dressing in 24 hours, take a shower allowing warm water to run over incision pat dry and leave to air. Please follow up with Dr. Bright in 1-2 weeks. Continue with daily ambulation and use of incentive spirometer. Please call the office at  if you experience an increase in shortness of breath, fever, purulent discharge from site of incision and pain not relieved by medication or rest. You may remove the dressing in 24 hours, take a shower allowing warm water to run over incision pat dry and leave to air. Please follow up with Dr. Bright in 2 weeks. Call for an apt. [phone]. Suture will be removed in office. Have Chest xray prior to your apt and bring those images with you.   Continue with daily ambulation and use of incentive spirometer. You may climb stairs.  Please call the office at  if you experience an increase in shortness of breath, fever, purulent discharge from site of incision and pain not relieved by medication or rest.

## 2018-01-18 NOTE — PHYSICAL THERAPY INITIAL EVALUATION ADULT - PERTINENT HX OF CURRENT PROBLEM, REHAB EVAL
Pt. is an 85 year old female admitted to [facility] secondary to robotic assistance in thoracoscopic procedure. PMH: HTN, HLD, Lung Cancer

## 2018-01-18 NOTE — DISCHARGE NOTE ADULT - MEDICATION SUMMARY - MEDICATIONS TO TAKE
I will START or STAY ON the medications listed below when I get home from the hospital:    acetaminophen 325 mg oral tablet  -- 2 tab(s) by mouth every 6 hours  -- Indication: For Pain    Percocet 5/325 oral tablet  -- 1 tab(s) by mouth every 6 hours, As Needed moderate to severe pain. MDD:4  -- Indication: For Pain    aspirin 81 mg oral tablet  -- 1 tab(s) by mouth once a day in am    -- Indication: For anti platelet    losartan 50 mg oral tablet  -- 1 tab(s) by mouth once a day in pm  -- Indication: For blood pressure    Crestor 5 mg oral tablet  -- 1 tab(s) by mouth once a day (at bedtime)  -- Indication: For cholesterol    amLODIPine 5 mg oral tablet  -- 1 tab(s) by mouth once a day in am  -- Indication: For blood pressure    senna oral tablet  -- 2 tab(s) by mouth once a day (at bedtime)  -- Indication: For constipation    docusate sodium 100 mg oral capsule  -- 1 cap(s) by mouth 3 times a day  -- Indication: For constipation    Vitamin D3 1000 intl units oral capsule  -- 1 cap(s) by mouth 2 times a day  -- Indication: For vitamin

## 2018-01-18 NOTE — DISCHARGE NOTE ADULT - CARE PLAN
Principal Discharge DX:	Lung nodule  Goal:	Wound healing; Follow up final pathology and subsequent treatment plan Principal Discharge DX:	Lung nodule  Goal:	Wound healing; Follow up final pathology and subsequent treatment plan  Assessment and plan of treatment:	You may remove the dressing in 24 hours, take a shower allowing warm water to run over incision pat dry and leave to air. Please follow up with Dr. Bright in 1-2 weeks. Continue with daily ambulation and use of incentive spirometer. Please call the office at  if you experience an increase in shortness of breath, fever, purulent discharge from site of incision and pain not relieved by medication or rest. Principal Discharge DX:	Lung nodule  Goal:	Wound healing; Follow up final pathology and subsequent treatment plan  Assessment and plan of treatment:	You may remove the dressing in 24 hours, take a shower allowing warm water to run over incision pat dry and leave to air. Please follow up with Dr. Bright in 2 weeks. Call for an apt. [phone]. Suture will be removed in office. Have Chest xray prior to your apt and bring those images with you.   Continue with daily ambulation and use of incentive spirometer. You may climb stairs.  Please call the office at  if you experience an increase in shortness of breath, fever, purulent discharge from site of incision and pain not relieved by medication or rest.

## 2018-01-18 NOTE — DISCHARGE NOTE ADULT - INSTRUCTIONS
Heart healthy diet Please maintian a heart healthy diet low in sodium and fats Please maintain a heart healthy diet low in sodium and fats Keep surgical incision clean and dry. Report to Emergency Department for any signs of infection, fever or chills.

## 2018-01-18 NOTE — PROGRESS NOTE ADULT - SUBJECTIVE AND OBJECTIVE BOX
Anesthesia Pain Management Service    SUBJECTIVE: Patient is doing well with IV PCA and no significant problems reported.    Pain Scale Score	At rest: ___ 	With Activity: ___ 	[X ] Refer to charted pain scores    THERAPY:    [ ] IV PCA Morphine		[ ] 5 mg/mL	[ ] 1 mg/mL  [X ] IV PCA Hydromorphone	[ ] 5 mg/mL	[X ] 1 mg/mL  [ ] IV PCA Fentanyl		[ ] 50 micrograms/mL    Demand dose __0.2_ lockout __6_ (minutes) Continuous Rate _0__ Total: __1.6_   mg used (in past 24 hrs)      MEDICATIONS  (STANDING):  acetaminophen   Tablet. 650 milliGRAM(s) Oral every 6 hours  aspirin enteric coated 81 milliGRAM(s) Oral daily  atorvastatin 20 milliGRAM(s) Oral at bedtime  cholecalciferol 1000 Unit(s) Oral two times a day  docusate sodium 100 milliGRAM(s) Oral three times a day  famotidine    Tablet 20 milliGRAM(s) Oral every 12 hours  heparin  Injectable 5000 Unit(s) SubCutaneous every 8 hours  lactated ringers. 1000 milliLiter(s) (30 mL/Hr) IV Continuous <Continuous>  senna 2 Tablet(s) Oral at bedtime    MEDICATIONS  (PRN):  metoclopramide Injectable 10 milliGRAM(s) IV Push every 8 hours PRN Nausea /Vomiting  oxyCODONE    IR 5 milliGRAM(s) Oral every 3 hours PRN Mild Pain (1 - 3) to Severe Pain      OBJECTIVE:    Sedation Score:	[ X] Alert	[ ] Drowsy 	[ ] Arousable	[ ] Asleep	[ ] Unresponsive    Side Effects:	[X ] None	[ ] Nausea	[ ] Vomiting	[ ] Pruritus  		[ ] Other:    Vital Signs Last 24 Hrs  T(C): 36.9 (18 Jan 2018 08:00), Max: 37.1 (18 Jan 2018 00:00)  T(F): 98.5 (18 Jan 2018 08:00), Max: 98.7 (18 Jan 2018 00:00)  HR: 64 (18 Jan 2018 10:00) (43 - 75)  BP: 117/43 (18 Jan 2018 10:00) (95/47 - 146/50)  BP(mean): 61 (18 Jan 2018 10:00) (58 - 77)  RR: 20 (18 Jan 2018 10:00) (14 - 21)  SpO2: 97% (18 Jan 2018 10:00) (93% - 100%)    ASSESSMENT/ PLAN    Therapy to  be:	[ ] Continue   [ X] Discontinued   [X ] Change to prn Analgesics    Documentation and Verification of current medications:   [X] Done	[ ] Not done, not elligible    Comments: PRN Oral/IV opioids and/or Adjuvant medication to be ordered at this point. Discussed with CT ICU attending who wants to stop IV PCA now. Tylenol ATC ordered plus PRN OxyIR.    Progress Note written now but Patient was seen earlier.

## 2018-01-18 NOTE — PROGRESS NOTE ADULT - SUBJECTIVE AND OBJECTIVE BOX
POST ANESTHESIA EVALUATION    85y Female POSTOP DAY 1 S/P     MENTAL STATUS: Patient participation [ X ] Awake     [  ] Arousable     [  ] Sedated    AIRWAY PATENCY: [X  ] Satisfactory  [  ] Other:     Vital Signs Last 24 Hrs  T(C): 36.9 (18 Jan 2018 08:00), Max: 37.1 (18 Jan 2018 00:00)  T(F): 98.5 (18 Jan 2018 08:00), Max: 98.7 (18 Jan 2018 00:00)  HR: 64 (18 Jan 2018 10:00) (43 - 75)  BP: 117/43 (18 Jan 2018 10:00) (95/47 - 146/50)  BP(mean): 61 (18 Jan 2018 10:00) (58 - 77)  RR: 20 (18 Jan 2018 10:00) (14 - 21)  SpO2: 97% (18 Jan 2018 10:00) (93% - 100%)  I&O's Summary    17 Jan 2018 07:01  -  18 Jan 2018 07:00  --------------------------------------------------------  IN: 1060 mL / OUT: 745 mL / NET: 315 mL    18 Jan 2018 07:01  -  18 Jan 2018 11:13  --------------------------------------------------------  IN: 90 mL / OUT: 0 mL / NET: 90 mL          NAUSEA/ VOMITTING:  [ X ] NONE  [  ] CONTROLLED [  ] OTHER     PAIN: [ X ] CONTROLLED WITH CURRENT REGIMEN  [  ] OTHER    [ X ] NO APPARENT ANESTHESIA COMPLICATIONS      Comments:

## 2018-01-18 NOTE — PHYSICAL THERAPY INITIAL EVALUATION ADULT - RANGE OF MOTION EXAMINATION, REHAB EVAL
bilateral shoulder range of motion not formally assessed secondary to post surgical precautions. Bilateral elbow,hand and wrist range of motion WFL. bilateral LE range of motion WFL.

## 2018-01-18 NOTE — PHYSICAL THERAPY INITIAL EVALUATION ADULT - PATIENT PROFILE REVIEW, REHAB EVAL
yes/Pt. profile reviewed, consulted with CHRIS GLASS prior to initial PT evaluation and tx, as per RN, Pt. is OK to participate in skilled therapy session, current activity orders; ambulate as tolerated.

## 2018-01-18 NOTE — DISCHARGE NOTE ADULT - HOSPITAL COURSE
This 85 year old female with hx of HTN, HLD, dementia, & lung CA underwent a R VATS, robotic-assisted right upper lobe wedge resection, and mediastinal lymph node dissection on 1/17/2018.  She had a hx of left lung CA and had undergone a L VATS, COLEMAN lobectomy in 2009.  Her right lung nodules had been followed via CT scan every 6 months until the most recent CT scan in 12/2017 showed that they had increased in size.  She had a post-op air leak and when that resolved, her chest tube was removed for discharge home. This 85 year old female with hx of HTN, HLD, dementia, & lung CA underwent a R VATS, robotic-assisted right upper lobe wedge resection, and mediastinal lymph node dissection on 1/17/2018.  She had a hx of left lung CA and had undergone a L VATS, COLEMAN lobectomy in 2009.  Her right lung nodules had been followed via CT scan every 6 months until the most recent CT scan in 12/2017 showed that they had increased in size.  She had a post-op air leak and which resolved. Patient was ambulating, resting comfortably, chest tube drainage was decreased to 65cc overnight. Patient had no air leak, and her chest tube was removed. Patient had a post chest tube xray which shows This 85 year old female with hx of HTN, HLD, dementia, & lung CA underwent a R VATS, robotic-assisted right upper lobe wedge resection, and mediastinal lymph node dissection on 1/17/2018.  She had a hx of left lung CA and had undergone a L VATS, COLEMAN lobectomy in 2009.  Her right lung nodules had been followed via CT scan every 6 months until the most recent CT scan in 12/2017 showed that they had increased in size.  She had a post-op air leak and which resolved. Patient was ambulating, resting comfortably, chest tube drainage was decreased to 65cc overnight. Patient had no air leak, and her chest tube was removed. Patient had a post chest tube xray which shows sml increase in sml apical ptx. Pt. remained asymptomatic. CXR repeated 4 hrs later and PTX was stable as per Radiology reading. Cleared for discharge by Dr. Cesar and Dr. Bright.

## 2018-01-19 VITALS
RESPIRATION RATE: 18 BRPM | HEART RATE: 73 BPM | DIASTOLIC BLOOD PRESSURE: 54 MMHG | OXYGEN SATURATION: 99 % | SYSTOLIC BLOOD PRESSURE: 126 MMHG | TEMPERATURE: 98 F

## 2018-01-19 LAB
BUN SERPL-MCNC: 15 MG/DL — SIGNIFICANT CHANGE UP (ref 7–23)
CA-I BLD-SCNC: 1.1 MMOL/L — SIGNIFICANT CHANGE UP (ref 1.03–1.23)
CALCIUM SERPL-MCNC: 9.1 MG/DL — SIGNIFICANT CHANGE UP (ref 8.4–10.5)
CHLORIDE SERPL-SCNC: 106 MMOL/L — SIGNIFICANT CHANGE UP (ref 98–107)
CO2 SERPL-SCNC: 22 MMOL/L — SIGNIFICANT CHANGE UP (ref 22–31)
CREAT SERPL-MCNC: 0.73 MG/DL — SIGNIFICANT CHANGE UP (ref 0.5–1.3)
GLUCOSE SERPL-MCNC: 95 MG/DL — SIGNIFICANT CHANGE UP (ref 70–99)
HCT VFR BLD CALC: 37.2 % — SIGNIFICANT CHANGE UP (ref 34.5–45)
HGB BLD-MCNC: 12.6 G/DL — SIGNIFICANT CHANGE UP (ref 11.5–15.5)
MAGNESIUM SERPL-MCNC: 2.2 MG/DL — SIGNIFICANT CHANGE UP (ref 1.6–2.6)
MCHC RBC-ENTMCNC: 33.5 PG — SIGNIFICANT CHANGE UP (ref 27–34)
MCHC RBC-ENTMCNC: 33.9 % — SIGNIFICANT CHANGE UP (ref 32–36)
MCV RBC AUTO: 98.9 FL — SIGNIFICANT CHANGE UP (ref 80–100)
NRBC # FLD: 0 — SIGNIFICANT CHANGE UP
PHOSPHATE SERPL-MCNC: 2.2 MG/DL — LOW (ref 2.5–4.5)
PLATELET # BLD AUTO: 159 K/UL — SIGNIFICANT CHANGE UP (ref 150–400)
PMV BLD: 11.5 FL — SIGNIFICANT CHANGE UP (ref 7–13)
POTASSIUM SERPL-MCNC: 4 MMOL/L — SIGNIFICANT CHANGE UP (ref 3.5–5.3)
POTASSIUM SERPL-SCNC: 4 MMOL/L — SIGNIFICANT CHANGE UP (ref 3.5–5.3)
RBC # BLD: 3.76 M/UL — LOW (ref 3.8–5.2)
RBC # FLD: 13.2 % — SIGNIFICANT CHANGE UP (ref 10.3–14.5)
SODIUM SERPL-SCNC: 142 MMOL/L — SIGNIFICANT CHANGE UP (ref 135–145)
WBC # BLD: 7.07 K/UL — SIGNIFICANT CHANGE UP (ref 3.8–10.5)
WBC # FLD AUTO: 7.07 K/UL — SIGNIFICANT CHANGE UP (ref 3.8–10.5)

## 2018-01-19 PROCEDURE — 71045 X-RAY EXAM CHEST 1 VIEW: CPT | Mod: 26

## 2018-01-19 PROCEDURE — 99238 HOSP IP/OBS DSCHRG MGMT 30/<: CPT

## 2018-01-19 RX ORDER — ACETAMINOPHEN 500 MG
2 TABLET ORAL
Qty: 0 | Refills: 0 | DISCHARGE
Start: 2018-01-19

## 2018-01-19 RX ORDER — DOCUSATE SODIUM 100 MG
1 CAPSULE ORAL
Qty: 0 | Refills: 0 | DISCHARGE
Start: 2018-01-19

## 2018-01-19 RX ORDER — ASPIRIN/CALCIUM CARB/MAGNESIUM 324 MG
1 TABLET ORAL
Qty: 0 | Refills: 0 | COMMUNITY

## 2018-01-19 RX ORDER — SENNA PLUS 8.6 MG/1
2 TABLET ORAL
Qty: 0 | Refills: 0 | DISCHARGE
Start: 2018-01-19

## 2018-01-19 RX ADMIN — Medication 650 MILLIGRAM(S): at 06:46

## 2018-01-19 RX ADMIN — FAMOTIDINE 20 MILLIGRAM(S): 10 INJECTION INTRAVENOUS at 17:17

## 2018-01-19 RX ADMIN — HEPARIN SODIUM 5000 UNIT(S): 5000 INJECTION INTRAVENOUS; SUBCUTANEOUS at 05:46

## 2018-01-19 RX ADMIN — FAMOTIDINE 20 MILLIGRAM(S): 10 INJECTION INTRAVENOUS at 05:46

## 2018-01-19 RX ADMIN — Medication 650 MILLIGRAM(S): at 17:18

## 2018-01-19 RX ADMIN — Medication 650 MILLIGRAM(S): at 05:46

## 2018-01-19 RX ADMIN — Medication 100 MILLIGRAM(S): at 11:47

## 2018-01-19 RX ADMIN — Medication 650 MILLIGRAM(S): at 00:30

## 2018-01-19 RX ADMIN — Medication 1000 UNIT(S): at 05:46

## 2018-01-19 RX ADMIN — Medication 100 MILLIGRAM(S): at 05:46

## 2018-01-19 RX ADMIN — HEPARIN SODIUM 5000 UNIT(S): 5000 INJECTION INTRAVENOUS; SUBCUTANEOUS at 11:47

## 2018-01-19 RX ADMIN — Medication 1000 UNIT(S): at 17:17

## 2018-01-19 RX ADMIN — Medication 81 MILLIGRAM(S): at 11:47

## 2018-01-30 ENCOUNTER — APPOINTMENT (OUTPATIENT)
Dept: THORACIC SURGERY | Facility: CLINIC | Age: 83
End: 2018-01-30
Payer: MEDICARE

## 2018-01-30 ENCOUNTER — APPOINTMENT (OUTPATIENT)
Dept: THORACIC SURGERY | Facility: CLINIC | Age: 83
End: 2018-01-30

## 2018-01-30 VITALS
HEART RATE: 64 BPM | OXYGEN SATURATION: 100 % | RESPIRATION RATE: 16 BRPM | SYSTOLIC BLOOD PRESSURE: 120 MMHG | DIASTOLIC BLOOD PRESSURE: 81 MMHG

## 2018-01-30 PROCEDURE — 99024 POSTOP FOLLOW-UP VISIT: CPT

## 2018-04-16 ENCOUNTER — FORM ENCOUNTER (OUTPATIENT)
Age: 83
End: 2018-04-16

## 2018-04-17 ENCOUNTER — APPOINTMENT (OUTPATIENT)
Dept: THORACIC SURGERY | Facility: CLINIC | Age: 83
End: 2018-04-17
Payer: MEDICARE

## 2018-04-17 ENCOUNTER — OUTPATIENT (OUTPATIENT)
Dept: OUTPATIENT SERVICES | Facility: HOSPITAL | Age: 83
LOS: 1 days | End: 2018-04-17
Payer: MEDICARE

## 2018-04-17 VITALS
RESPIRATION RATE: 18 BRPM | HEART RATE: 76 BPM | SYSTOLIC BLOOD PRESSURE: 110 MMHG | OXYGEN SATURATION: 98 % | DIASTOLIC BLOOD PRESSURE: 70 MMHG

## 2018-04-17 DIAGNOSIS — Z98.49 CATARACT EXTRACTION STATUS, UNSPECIFIED EYE: Chronic | ICD-10-CM

## 2018-04-17 DIAGNOSIS — Z98.890 OTHER SPECIFIED POSTPROCEDURAL STATES: Chronic | ICD-10-CM

## 2018-04-17 DIAGNOSIS — Z90.2 ACQUIRED ABSENCE OF LUNG [PART OF]: Chronic | ICD-10-CM

## 2018-04-17 DIAGNOSIS — C34.90 MALIGNANT NEOPLASM OF UNSPECIFIED PART OF UNSPECIFIED BRONCHUS OR LUNG: ICD-10-CM

## 2018-04-17 PROCEDURE — 71046 X-RAY EXAM CHEST 2 VIEWS: CPT | Mod: 26

## 2018-04-17 PROCEDURE — 99024 POSTOP FOLLOW-UP VISIT: CPT

## 2018-07-10 ENCOUNTER — FORM ENCOUNTER (OUTPATIENT)
Age: 83
End: 2018-07-10

## 2018-07-11 ENCOUNTER — APPOINTMENT (OUTPATIENT)
Dept: CT IMAGING | Facility: IMAGING CENTER | Age: 83
End: 2018-07-11
Payer: MEDICARE

## 2018-07-11 ENCOUNTER — OUTPATIENT (OUTPATIENT)
Dept: OUTPATIENT SERVICES | Facility: HOSPITAL | Age: 83
LOS: 1 days | End: 2018-07-11
Payer: MEDICARE

## 2018-07-11 DIAGNOSIS — C34.90 MALIGNANT NEOPLASM OF UNSPECIFIED PART OF UNSPECIFIED BRONCHUS OR LUNG: ICD-10-CM

## 2018-07-11 DIAGNOSIS — Z90.2 ACQUIRED ABSENCE OF LUNG [PART OF]: Chronic | ICD-10-CM

## 2018-07-11 DIAGNOSIS — Z98.49 CATARACT EXTRACTION STATUS, UNSPECIFIED EYE: Chronic | ICD-10-CM

## 2018-07-11 DIAGNOSIS — Z98.890 OTHER SPECIFIED POSTPROCEDURAL STATES: Chronic | ICD-10-CM

## 2018-07-11 DIAGNOSIS — Z00.8 ENCOUNTER FOR OTHER GENERAL EXAMINATION: ICD-10-CM

## 2018-07-11 PROCEDURE — 71250 CT THORAX DX C-: CPT | Mod: 26

## 2018-07-11 PROCEDURE — 71250 CT THORAX DX C-: CPT

## 2018-07-16 PROBLEM — R73.03 PREDIABETES: Chronic | Status: ACTIVE | Noted: 2018-01-10

## 2018-07-17 ENCOUNTER — APPOINTMENT (OUTPATIENT)
Dept: THORACIC SURGERY | Facility: CLINIC | Age: 83
End: 2018-07-17
Payer: MEDICARE

## 2018-07-17 VITALS
SYSTOLIC BLOOD PRESSURE: 135 MMHG | DIASTOLIC BLOOD PRESSURE: 70 MMHG | OXYGEN SATURATION: 98 % | TEMPERATURE: 98 F | HEART RATE: 63 BPM | RESPIRATION RATE: 18 BRPM | BODY MASS INDEX: 23.73 KG/M2 | WEIGHT: 139 LBS | HEIGHT: 64 IN

## 2018-07-17 PROCEDURE — 99214 OFFICE O/P EST MOD 30 MIN: CPT

## 2018-07-18 PROBLEM — I10 ESSENTIAL (PRIMARY) HYPERTENSION: Chronic | Status: ACTIVE | Noted: 2018-01-10

## 2018-07-18 PROBLEM — E78.5 HYPERLIPIDEMIA, UNSPECIFIED: Chronic | Status: ACTIVE | Noted: 2018-01-10

## 2018-07-18 PROBLEM — F03.90 UNSPECIFIED DEMENTIA WITHOUT BEHAVIORAL DISTURBANCE: Chronic | Status: ACTIVE | Noted: 2018-01-10

## 2018-07-18 PROBLEM — C34.90 MALIGNANT NEOPLASM OF UNSPECIFIED PART OF UNSPECIFIED BRONCHUS OR LUNG: Chronic | Status: ACTIVE | Noted: 2018-01-10

## 2018-07-18 PROBLEM — R91.1 SOLITARY PULMONARY NODULE: Chronic | Status: ACTIVE | Noted: 2018-01-10

## 2018-12-03 ENCOUNTER — FORM ENCOUNTER (OUTPATIENT)
Age: 83
End: 2018-12-03

## 2018-12-04 ENCOUNTER — APPOINTMENT (OUTPATIENT)
Dept: CT IMAGING | Facility: IMAGING CENTER | Age: 83
End: 2018-12-04
Payer: MEDICARE

## 2018-12-04 ENCOUNTER — OUTPATIENT (OUTPATIENT)
Dept: OUTPATIENT SERVICES | Facility: HOSPITAL | Age: 83
LOS: 1 days | End: 2018-12-04
Payer: MEDICARE

## 2018-12-04 DIAGNOSIS — Z90.2 ACQUIRED ABSENCE OF LUNG [PART OF]: Chronic | ICD-10-CM

## 2018-12-04 DIAGNOSIS — C34.90 MALIGNANT NEOPLASM OF UNSPECIFIED PART OF UNSPECIFIED BRONCHUS OR LUNG: ICD-10-CM

## 2018-12-04 DIAGNOSIS — Z98.49 CATARACT EXTRACTION STATUS, UNSPECIFIED EYE: Chronic | ICD-10-CM

## 2018-12-04 DIAGNOSIS — Z98.890 OTHER SPECIFIED POSTPROCEDURAL STATES: Chronic | ICD-10-CM

## 2018-12-04 DIAGNOSIS — Z00.8 ENCOUNTER FOR OTHER GENERAL EXAMINATION: ICD-10-CM

## 2018-12-04 PROCEDURE — 71250 CT THORAX DX C-: CPT | Mod: 26

## 2018-12-04 PROCEDURE — 71250 CT THORAX DX C-: CPT

## 2018-12-06 ENCOUNTER — APPOINTMENT (OUTPATIENT)
Dept: PULMONOLOGY | Facility: CLINIC | Age: 83
End: 2018-12-06
Payer: MEDICARE

## 2018-12-06 VITALS
BODY MASS INDEX: 24.37 KG/M2 | WEIGHT: 142 LBS | OXYGEN SATURATION: 99 % | DIASTOLIC BLOOD PRESSURE: 70 MMHG | HEART RATE: 71 BPM | SYSTOLIC BLOOD PRESSURE: 130 MMHG

## 2018-12-06 DIAGNOSIS — R93.89 ABNORMAL FINDINGS ON DIAGNOSTIC IMAGING OF OTHER SPECIFIED BODY STRUCTURES: ICD-10-CM

## 2018-12-06 PROCEDURE — 99214 OFFICE O/P EST MOD 30 MIN: CPT

## 2018-12-18 ENCOUNTER — APPOINTMENT (OUTPATIENT)
Dept: THORACIC SURGERY | Facility: CLINIC | Age: 83
End: 2018-12-18
Payer: MEDICARE

## 2018-12-18 VITALS
BODY MASS INDEX: 24.24 KG/M2 | HEART RATE: 75 BPM | OXYGEN SATURATION: 99 % | SYSTOLIC BLOOD PRESSURE: 155 MMHG | TEMPERATURE: 98.3 F | HEIGHT: 64 IN | WEIGHT: 142 LBS | RESPIRATION RATE: 16 BRPM | DIASTOLIC BLOOD PRESSURE: 70 MMHG

## 2018-12-18 PROCEDURE — 99213 OFFICE O/P EST LOW 20 MIN: CPT

## 2019-05-31 ENCOUNTER — FORM ENCOUNTER (OUTPATIENT)
Age: 84
End: 2019-05-31

## 2019-06-01 ENCOUNTER — OUTPATIENT (OUTPATIENT)
Dept: OUTPATIENT SERVICES | Facility: HOSPITAL | Age: 84
LOS: 1 days | End: 2019-06-01
Payer: MEDICARE

## 2019-06-01 ENCOUNTER — APPOINTMENT (OUTPATIENT)
Dept: CT IMAGING | Facility: IMAGING CENTER | Age: 84
End: 2019-06-01
Payer: MEDICARE

## 2019-06-01 DIAGNOSIS — Z90.2 ACQUIRED ABSENCE OF LUNG [PART OF]: Chronic | ICD-10-CM

## 2019-06-01 DIAGNOSIS — Z98.890 OTHER SPECIFIED POSTPROCEDURAL STATES: Chronic | ICD-10-CM

## 2019-06-01 DIAGNOSIS — C34.90 MALIGNANT NEOPLASM OF UNSPECIFIED PART OF UNSPECIFIED BRONCHUS OR LUNG: ICD-10-CM

## 2019-06-01 DIAGNOSIS — Z98.49 CATARACT EXTRACTION STATUS, UNSPECIFIED EYE: Chronic | ICD-10-CM

## 2019-06-01 PROCEDURE — 71250 CT THORAX DX C-: CPT

## 2019-06-01 PROCEDURE — 71250 CT THORAX DX C-: CPT | Mod: 26

## 2019-06-04 ENCOUNTER — APPOINTMENT (OUTPATIENT)
Dept: THORACIC SURGERY | Facility: CLINIC | Age: 84
End: 2019-06-04
Payer: MEDICARE

## 2019-06-04 VITALS
DIASTOLIC BLOOD PRESSURE: 74 MMHG | BODY MASS INDEX: 23.34 KG/M2 | WEIGHT: 136 LBS | SYSTOLIC BLOOD PRESSURE: 130 MMHG | HEART RATE: 62 BPM | RESPIRATION RATE: 16 BRPM | OXYGEN SATURATION: 96 %

## 2019-06-04 PROCEDURE — 99213 OFFICE O/P EST LOW 20 MIN: CPT

## 2019-06-06 NOTE — ASSESSMENT
[FreeTextEntry1] : 85 y/o F, never smoker, w/ hx of HLD, Dementia, and metachronous Lung CA.\par Now 9yr 10mo s/p FB, mediastinoscopy, Lt VATS LULobectomy on 8/19/09. Path revealed AdenoCA, 1.4cm, margins and LNs negative, pT1N0 Stg IA.\par \par Now 1 year 5 mo s/p FB, Rt VATS, Robotic Assisted, lysis of adhesions, RUL wedge rxn x 2, MLND on 1/17/18. Path revealed AdenoCA, predominantly acinar with minor solid component, 1.6cm, LNs and margins are negative, pT1bN0 Stg IA2. +TTF-1. The second nodule in the RUL showed no significant pathologic changes.\par \par CT chest on 6/1/19:\par - stable post-op changes\par - stable 6mm RLL nodule\par - ALPHONSE\par \par I have reviewed the patient's medical records and diagnostic images at time of this office consultation and have made the following recommendation:\par 1. CT scan showed no evidence of recurrence, recommended patient to return to office in 6 mo with CT Chest\par \par \par Written by Swati Gasca NP, acting as a scribe for Dr. Bj Llanos.\par \par The documentation recorded by the scribe accurately reflects the service I personally performed and the decisions made by me. BJ LLANOS MD\par

## 2019-06-06 NOTE — CONSULT LETTER
[Dear  ___] : Dear  [unfilled], [( Thank you for referring [unfilled] for consultation for _____ )] : Thank you for referring [unfilled] for consultation for [unfilled] [Consult Letter:] : I had the pleasure of evaluating your patient, [unfilled]. [Please see my note below.] : Please see my note below. [Consult Closing:] : Thank you very much for allowing me to participate in the care of this patient.  If you have any questions, please do not hesitate to contact me. [Sincerely,] : Sincerely, [DrMargaret  ___] : Dr. DUMONT [DrMargaret ___] : Dr. DUMONT [FreeTextEntry2] : Santana Dougherty MD (Pul/Ref)\par Behzad Paimany, MD (Cardiologist)\par Zackary Eaton MD (PCP) [FreeTextEntry3] : Bj Bright MD, MPH \par System Director of Thoracic Surgery \par Director of Comprehensive Lung and [facility]gut [place] \par Professor Cardiovascular & Thoracic Surgery  \par [facility] [employer] at [facility]\par

## 2019-06-06 NOTE — DATA REVIEWED
[FreeTextEntry1] : CT chest on 6/1/19:\par - stable post-op changes\par - stable 6mm RLL nodule\par - ALPHONSE

## 2019-06-06 NOTE — PHYSICAL EXAM
[Heart Rate And Rhythm] : heart rate was normal and rhythm regular [Heart Sounds] : normal S1 and S2 [Auscultation Breath Sounds / Voice Sounds] : lungs were clear to auscultation bilaterally [Murmurs] : no murmurs [Heart Sounds Gallop] : no gallops [Examination Of The Chest] : the chest was normal in appearance [Heart Sounds Pericardial Friction Rub] : no pericardial rub [Diminished Respiratory Excursion] : normal chest expansion [Chest Visual Inspection Thoracic Asymmetry] : no chest asymmetry [Bowel Sounds] : normal bowel sounds [Abdomen Soft] : soft [Abdomen Mass (___ Cm)] : no abdominal mass palpated [Abdomen Tenderness] : non-tender [Abnormal Walk] : normal gait [Musculoskeletal - Swelling] : no joint swelling seen [Nail Clubbing] : no clubbing  or cyanosis of the fingernails [Motor Tone] : muscle strength and tone were normal [Skin Color & Pigmentation] : normal skin color and pigmentation [] : no rash [Skin Turgor] : normal skin turgor [No Focal Deficits] : no focal deficits [Sensation] : the sensory exam was normal to light touch and pinprick [Deep Tendon Reflexes (DTR)] : deep tendon reflexes were 2+ and symmetric [Oriented To Time, Place, And Person] : oriented to person, place, and time [Impaired Insight] : insight and judgment were intact [Affect] : the affect was normal

## 2019-06-06 NOTE — HISTORY OF PRESENT ILLNESS
[FreeTextEntry1] : 87 y/o F, never smoker, w/ hx of HLD, Dementia, and metachronous Lung CA.\par Now 9yr 10mo s/p FB, mediastinoscopy, Lt VATS LULobectomy on 8/19/09. Path revealed AdenoCA, 1.4cm, margins and LNs negative, pT1N0 Stg IA.\par \par Now 1 year 5 mo s/p FB, Rt VATS, Robotic Assisted, lysis of adhesions, RUL wedge rxn x 2, MLND on 1/17/18. Path revealed AdenoCA, predominantly acinar with minor solid component, 1.6cm, LNs and margins are negative, pT1bN0 Stg IA2. +TTF-1. The second nodule in the RUL showed no significant pathologic changes.\par \par CT Chest on 7/11/18:\par - post-op changes\par - RLL 6mm nodular opacity, likely a focus of atelectasis\par \par CT Chest on 12/4/18:\par - post-op changes\par - stable 0.6cm RLL nodular opacity\par \par CT chest on 6/1/19:\par - stable post-op changes\par - stable 6mm RLL nodule\par - ALPHONSE\par \par She presents today for a followup visit. Walks around in the house, denies SOB, CP or cough.\par

## 2019-12-08 ENCOUNTER — FORM ENCOUNTER (OUTPATIENT)
Age: 84
End: 2019-12-08

## 2019-12-09 ENCOUNTER — APPOINTMENT (OUTPATIENT)
Dept: CT IMAGING | Facility: IMAGING CENTER | Age: 84
End: 2019-12-09
Payer: MEDICARE

## 2019-12-09 ENCOUNTER — OUTPATIENT (OUTPATIENT)
Dept: OUTPATIENT SERVICES | Facility: HOSPITAL | Age: 84
LOS: 1 days | End: 2019-12-09
Payer: MEDICARE

## 2019-12-09 DIAGNOSIS — C34.90 MALIGNANT NEOPLASM OF UNSPECIFIED PART OF UNSPECIFIED BRONCHUS OR LUNG: ICD-10-CM

## 2019-12-09 DIAGNOSIS — Z90.2 ACQUIRED ABSENCE OF LUNG [PART OF]: Chronic | ICD-10-CM

## 2019-12-09 DIAGNOSIS — Z98.49 CATARACT EXTRACTION STATUS, UNSPECIFIED EYE: Chronic | ICD-10-CM

## 2019-12-09 DIAGNOSIS — Z98.890 OTHER SPECIFIED POSTPROCEDURAL STATES: Chronic | ICD-10-CM

## 2019-12-09 PROCEDURE — 71250 CT THORAX DX C-: CPT | Mod: 26

## 2019-12-09 PROCEDURE — 71250 CT THORAX DX C-: CPT

## 2019-12-12 ENCOUNTER — APPOINTMENT (OUTPATIENT)
Dept: THORACIC SURGERY | Facility: CLINIC | Age: 84
End: 2019-12-12
Payer: MEDICARE

## 2019-12-12 VITALS
WEIGHT: 138 LBS | OXYGEN SATURATION: 100 % | HEIGHT: 64 IN | BODY MASS INDEX: 23.56 KG/M2 | TEMPERATURE: 97.8 F | RESPIRATION RATE: 16 BRPM | DIASTOLIC BLOOD PRESSURE: 82 MMHG | SYSTOLIC BLOOD PRESSURE: 133 MMHG | HEART RATE: 61 BPM

## 2019-12-12 PROCEDURE — 99213 OFFICE O/P EST LOW 20 MIN: CPT

## 2019-12-12 RX ORDER — LOSARTAN POTASSIUM 25 MG/1
25 TABLET, FILM COATED ORAL
Refills: 0 | Status: ACTIVE | COMMUNITY

## 2019-12-12 RX ORDER — AMLODIPINE BESYLATE 2.5 MG/1
2.5 TABLET ORAL
Refills: 0 | Status: COMPLETED | COMMUNITY
End: 2019-12-12

## 2019-12-12 NOTE — PHYSICAL EXAM
[] : no respiratory distress [Respiration, Rhythm And Depth] : normal respiratory rhythm and effort [Auscultation Breath Sounds / Voice Sounds] : lungs were clear to auscultation bilaterally [Heart Rate And Rhythm] : heart rate was normal and rhythm regular [Heart Sounds] : normal S1 and S2 [Examination Of The Chest] : the chest was normal in appearance [2+] : left 2+ [Bowel Sounds] : normal bowel sounds [Breast Appearance] : normal in appearance [Abdomen Tenderness] : non-tender [Abdomen Soft] : soft [Nail Clubbing] : no clubbing  or cyanosis of the fingernails [Abnormal Walk] : normal gait [Musculoskeletal - Swelling] : no joint swelling seen [Motor Tone] : muscle strength and tone were normal [Skin Turgor] : normal skin turgor [No Focal Deficits] : no focal deficits [Skin Color & Pigmentation] : normal skin color and pigmentation [Oriented To Time, Place, And Person] : oriented to person, place, and time [Affect] : the affect was normal [Impaired Insight] : insight and judgment were intact

## 2019-12-16 NOTE — ASSESSMENT
[FreeTextEntry1] : 88 y/o F, never smoker, w/ hx of HLD, Dementia, and metachronous Lung CA.\par Now 10 yrs 4 months s/p FB, mediastinoscopy, Lt VATS LULobectomy on 8/19/09. Path revealed AdenoCA, 1.4cm, margins and LNs negative, pT1N0 Stg IA.\par \par Now 1 yrs 11 mo s/p FB, Rt VATS, Robotic Assisted, lysis of adhesions, RUL wedge rxn x 2, MLND on 1/17/18. Path revealed AdenoCA, predominantly acinar with minor solid component, 1.6cm, LNs and margins are negative, pT1bN0 Stg IA2. +TTF-1. The second nodule in the RUL showed no significant pathologic changes.\par \par CT Chest on 12/9/19:\par - stable post-op changes\par - ALPHONSE\par \par I have reviewed the patient's medical records and diagnostic images at time of this office consultation and have made the following recommendation:\par 1.  CT Chest reviewed with patient, no evidence of recurrence, pt to return in 6 months with CT Chest.\par \par I personally performed the services described in the documentation, reviewed the documentation recorded by the scribe in my presence and it accurately and completely records my words and actions.\par \par I, Wing Nicol NP, am scribing for and the presence of NATHAN Pérez, the following sections HISTORY OF PRESENT ILLNESS, PAST MEDICAL/FAMILY/SOCIAL HISTORY; REVIEW OF SYSTEMS; VITAL SIGNS; PHYSICAL EXAM; DISPOSITION.

## 2019-12-16 NOTE — CONSULT LETTER
[Dear  ___] : Dear  [unfilled], [Consult Letter:] : I had the pleasure of evaluating your patient, [unfilled]. [( Thank you for referring [unfilled] for consultation for _____ )] : Thank you for referring [unfilled] for consultation for [unfilled] [Please see my note below.] : Please see my note below. [Sincerely,] : Sincerely, [Consult Closing:] : Thank you very much for allowing me to participate in the care of this patient.  If you have any questions, please do not hesitate to contact me. [DrMargaret  ___] : Dr. DUMONT [DrMargaret ___] : Dr. DUMONT [FreeTextEntry2] : Santana Dougherty MD (Pul/Ref)\par Behzad Paimany, MD (Cardiologist)\par Zackary Eaton MD (PCP)  [FreeTextEntry3] : Bj Bright MD, MPH \par System Director of Thoracic Surgery \par Director of Comprehensive Lung and Foregut [place] \par Professor Cardiovascular & Thoracic Surgery  \par [facility] [employer] at [facility]\par

## 2019-12-16 NOTE — HISTORY OF PRESENT ILLNESS
[FreeTextEntry1] : 88 y/o F, never smoker, w/ hx of HLD, Dementia, and metachronous Lung CA.\par Now 10 yrs 4 months s/p FB, mediastinoscopy, Lt VATS LULobectomy on 8/19/09. Path revealed AdenoCA, 1.4cm, margins and LNs negative, pT1N0 Stg IA.\par \par Now 1 yrs 11 mo s/p FB, Rt VATS, Robotic Assisted, lysis of adhesions, RUL wedge rxn x 2, MLND on 1/17/18. Path revealed AdenoCA, predominantly acinar with minor solid component, 1.6cm, LNs and margins are negative, pT1bN0 Stg IA2. +TTF-1. The second nodule in the RUL showed no significant pathologic changes.\par \par CT Chest on 7/11/18:\par - post-op changes\par - RLL 6mm nodular opacity, likely a focus of atelectasis\par \par CT Chest on 12/4/18:\par - post-op changes\par - stable 0.6cm RLL nodular opacity\par \par CT chest on 6/1/19:\par - stable post-op changes\par - stable 6mm RLL nodule\par - ALPHONSE\par \par CT Chest on 12/9/19:\par - stable post-op changes\par - ALPHONSE\par \par Patient is here today for a follow up.  Pt denies CP, SOB, fever, cough, palpitations, or unintentional weight loss. \par

## 2019-12-18 ENCOUNTER — APPOINTMENT (OUTPATIENT)
Dept: INTERNAL MEDICINE | Facility: CLINIC | Age: 84
End: 2019-12-18
Payer: MEDICARE

## 2019-12-18 VITALS
HEIGHT: 64 IN | SYSTOLIC BLOOD PRESSURE: 124 MMHG | BODY MASS INDEX: 20.14 KG/M2 | WEIGHT: 118 LBS | HEART RATE: 65 BPM | OXYGEN SATURATION: 99 % | TEMPERATURE: 98.6 F | DIASTOLIC BLOOD PRESSURE: 70 MMHG | RESPIRATION RATE: 12 BRPM

## 2019-12-18 DIAGNOSIS — Z00.00 ENCOUNTER FOR GENERAL ADULT MEDICAL EXAMINATION W/OUT ABNORMAL FINDINGS: ICD-10-CM

## 2019-12-18 PROCEDURE — 90662 IIV NO PRSV INCREASED AG IM: CPT

## 2019-12-18 PROCEDURE — 99387 INIT PM E/M NEW PAT 65+ YRS: CPT | Mod: 25,GY

## 2019-12-18 PROCEDURE — G0008: CPT

## 2019-12-18 PROCEDURE — 96372 THER/PROPH/DIAG INJ SC/IM: CPT

## 2019-12-18 RX ORDER — CHROMIUM 200 MCG
1000 TABLET ORAL
Refills: 0 | Status: ACTIVE | COMMUNITY

## 2019-12-22 LAB
25(OH)D3 SERPL-MCNC: 39.2 NG/ML
ALBUMIN SERPL ELPH-MCNC: 4.3 G/DL
ALP BLD-CCNC: 94 U/L
ALT SERPL-CCNC: 19 U/L
ANION GAP SERPL CALC-SCNC: 13 MMOL/L
AST SERPL-CCNC: 19 U/L
BASOPHILS # BLD AUTO: 0.03 K/UL
BASOPHILS NFR BLD AUTO: 0.5 %
BILIRUB SERPL-MCNC: 0.4 MG/DL
BUN SERPL-MCNC: 23 MG/DL
CALCIUM SERPL-MCNC: 10.4 MG/DL
CHLORIDE SERPL-SCNC: 103 MMOL/L
CHOLEST SERPL-MCNC: 183 MG/DL
CHOLEST/HDLC SERPL: 2.7 RATIO
CO2 SERPL-SCNC: 26 MMOL/L
CREAT SERPL-MCNC: 0.75 MG/DL
EOSINOPHIL # BLD AUTO: 0.01 K/UL
EOSINOPHIL NFR BLD AUTO: 0.2 %
ESTIMATED AVERAGE GLUCOSE: 111 MG/DL
GLUCOSE SERPL-MCNC: 100 MG/DL
HBA1C MFR BLD HPLC: 5.5 %
HCT VFR BLD CALC: 42.7 %
HDLC SERPL-MCNC: 68 MG/DL
HGB BLD-MCNC: 13.6 G/DL
IMM GRANULOCYTES NFR BLD AUTO: 0.2 %
LDLC SERPL CALC-MCNC: 99 MG/DL
LYMPHOCYTES # BLD AUTO: 1.27 K/UL
LYMPHOCYTES NFR BLD AUTO: 21.1 %
MAN DIFF?: NORMAL
MCHC RBC-ENTMCNC: 31.9 GM/DL
MCHC RBC-ENTMCNC: 32.2 PG
MCV RBC AUTO: 101.2 FL
MONOCYTES # BLD AUTO: 0.46 K/UL
MONOCYTES NFR BLD AUTO: 7.6 %
NEUTROPHILS # BLD AUTO: 4.24 K/UL
NEUTROPHILS NFR BLD AUTO: 70.4 %
PLATELET # BLD AUTO: 188 K/UL
POTASSIUM SERPL-SCNC: 4.7 MMOL/L
PROT SERPL-MCNC: 6.7 G/DL
RBC # BLD: 4.22 M/UL
RBC # FLD: 13.4 %
SODIUM SERPL-SCNC: 142 MMOL/L
TRIGL SERPL-MCNC: 81 MG/DL
TSH SERPL-ACNC: 3.62 UIU/ML
VIT B12 SERPL-MCNC: 272 PG/ML
WBC # FLD AUTO: 6.02 K/UL

## 2019-12-22 NOTE — PHYSICAL EXAM
[Normal] : no acute distress, well nourished, well developed and well-appearing [Normal Sclera/Conjunctiva] : normal sclera/conjunctiva [Normal Outer Ear/Nose] : the outer ears and nose were normal in appearance [No JVD] : no jugular venous distention [No Respiratory Distress] : no respiratory distress  [No Accessory Muscle Use] : no accessory muscle use [Clear to Auscultation] : lungs were clear to auscultation bilaterally [Normal Rate] : normal rate  [Regular Rhythm] : with a regular rhythm [Normal S1, S2] : normal S1 and S2 [No Edema] : there was no peripheral edema [Non Tender] : non-tender [Soft] : abdomen soft [Non-distended] : non-distended [Normal Bowel Sounds] : normal bowel sounds [Normal Posterior Cervical Nodes] : no posterior cervical lymphadenopathy [Normal Anterior Cervical Nodes] : no anterior cervical lymphadenopathy [No CVA Tenderness] : no CVA  tenderness [No Rash] : no rash [Normal Gait] : normal gait [de-identified] : + small varicosities  [de-identified] : Awake, alert, confused to time

## 2019-12-22 NOTE — HISTORY OF PRESENT ILLNESS
[de-identified] : Ms. ALEXANDRA ZUNIGA is a 87 year old female, accompannied by her son, with PMHx of HTN, HLD, Lung Ca, s/p Left upper lobe resection, dementia, presents for initial evaluation/establish care / annual physical\par She is following with CT surgeon every 6 months\par \par \par

## 2020-03-24 ENCOUNTER — EMERGENCY (EMERGENCY)
Facility: HOSPITAL | Age: 85
LOS: 1 days | Discharge: HOME | End: 2020-03-24
Attending: EMERGENCY MEDICINE | Admitting: EMERGENCY MEDICINE
Payer: MEDICARE

## 2020-03-24 VITALS
SYSTOLIC BLOOD PRESSURE: 109 MMHG | OXYGEN SATURATION: 98 % | WEIGHT: 110.01 LBS | RESPIRATION RATE: 16 BRPM | HEART RATE: 65 BPM | DIASTOLIC BLOOD PRESSURE: 49 MMHG | TEMPERATURE: 98 F | HEIGHT: 66 IN

## 2020-03-24 DIAGNOSIS — Z98.49 CATARACT EXTRACTION STATUS, UNSPECIFIED EYE: Chronic | ICD-10-CM

## 2020-03-24 DIAGNOSIS — Z98.890 OTHER SPECIFIED POSTPROCEDURAL STATES: Chronic | ICD-10-CM

## 2020-03-24 DIAGNOSIS — Z90.2 ACQUIRED ABSENCE OF LUNG [PART OF]: Chronic | ICD-10-CM

## 2020-03-24 PROCEDURE — 99285 EMERGENCY DEPT VISIT HI MDM: CPT | Mod: 25

## 2020-03-24 PROCEDURE — 71045 X-RAY EXAM CHEST 1 VIEW: CPT | Mod: 26

## 2020-03-24 RX ORDER — SODIUM CHLORIDE 9 MG/ML
1000 INJECTION INTRAMUSCULAR; INTRAVENOUS; SUBCUTANEOUS
Refills: 0 | Status: DISCONTINUED | OUTPATIENT
Start: 2020-03-24 | End: 2020-03-28

## 2020-03-24 NOTE — ED ADULT TRIAGE NOTE - CHIEF COMPLAINT QUOTE
She fell on floor, face down, laceration and abrasion to nose, found by family member.  Patient does not remember event

## 2020-03-24 NOTE — ED PROVIDER NOTE - ENMT, MLM
Airway patent, Nasal mucosa clear. Mouth with normal mucosa. Throat has no vesicles, no oropharyngeal exudates and uvula is midline.  no epistaxis

## 2020-03-24 NOTE — ED PROVIDER NOTE - OBJECTIVE STATEMENT
87 y.o. female BIBA as per son pt was doing fine all day.  Pt had dinner, it appears that pt might have took an extra dose of Losartan 25mg.  Pt's  tried waking pt up from the couch ~9pm, pt lost balance & fell, pt fell face down on the carpet.  Pt appeared to be drowsy & weak when he went down to check his mother.  noted pt defecated on herself.  Pt sustained injury to her nose.  last tetanus-?  Pt with good appetite.  Noted B/P was low upon EMS arrival  [phone] 87 y.o. female BIBA as per son pt was doing fine all day.  Pt had dinner, it appears that pt might have took an extra dose of Losartan 25mg.  Pt's  tried waking pt up from the couch ~9pm, pt lost balance & fell, pt fell face down on the carpet.  Pt appeared to be drowsy & weak when he went down to check his mother.  noted pt defecated on herself.  Pt sustained injury to her nose.  last tetanus-?  Pt with good appetite.  Noted B/P was low upon EMS arrival  [phone]  Pt claims she lost balance & fell, denies any pain

## 2020-03-24 NOTE — ED PROVIDER NOTE - MUSCULOSKELETAL, MLM
Spine appears normal, range of motion is not limited, no muscle or joint tenderness, neck- NT to palp.,

## 2020-03-24 NOTE — ED PROVIDER NOTE - PATIENT PORTAL LINK FT
You can access the FollowMyHealth Patient Portal offered by [facility] by registering at the following website: [email or web address]. By joining [facility]’s FollowMyHealth portal, you will also be able to view your health information using other applications (apps) compatible with our system.

## 2020-03-25 VITALS
RESPIRATION RATE: 16 BRPM | OXYGEN SATURATION: 100 % | DIASTOLIC BLOOD PRESSURE: 51 MMHG | TEMPERATURE: 98 F | HEART RATE: 63 BPM | SYSTOLIC BLOOD PRESSURE: 125 MMHG

## 2020-03-25 LAB
ACETONE SERPL-MCNC: NEGATIVE — SIGNIFICANT CHANGE UP
ALBUMIN SERPL ELPH-MCNC: 3.6 G/DL — SIGNIFICANT CHANGE UP (ref 3.5–5)
ALP SERPL-CCNC: 119 U/L — SIGNIFICANT CHANGE UP (ref 40–120)
ALT FLD-CCNC: 35 U/L DA — SIGNIFICANT CHANGE UP (ref 10–60)
ANION GAP SERPL CALC-SCNC: 5 MMOL/L — SIGNIFICANT CHANGE UP (ref 5–17)
APTT BLD: 29.5 SEC — SIGNIFICANT CHANGE UP (ref 27.5–36.3)
AST SERPL-CCNC: 19 U/L — SIGNIFICANT CHANGE UP (ref 10–40)
BASOPHILS # BLD AUTO: 0.03 K/UL — SIGNIFICANT CHANGE UP (ref 0–0.2)
BASOPHILS NFR BLD AUTO: 0.3 % — SIGNIFICANT CHANGE UP (ref 0–2)
BILIRUB SERPL-MCNC: 0.4 MG/DL — SIGNIFICANT CHANGE UP (ref 0.2–1.2)
BUN SERPL-MCNC: 28 MG/DL — HIGH (ref 7–18)
CALCIUM SERPL-MCNC: 9.2 MG/DL — SIGNIFICANT CHANGE UP (ref 8.4–10.5)
CHLORIDE SERPL-SCNC: 107 MMOL/L — SIGNIFICANT CHANGE UP (ref 96–108)
CK SERPL-CCNC: 94 U/L — SIGNIFICANT CHANGE UP (ref 21–215)
CO2 SERPL-SCNC: 28 MMOL/L — SIGNIFICANT CHANGE UP (ref 22–31)
CREAT SERPL-MCNC: 0.89 MG/DL — SIGNIFICANT CHANGE UP (ref 0.5–1.3)
EOSINOPHIL # BLD AUTO: 0.01 K/UL — SIGNIFICANT CHANGE UP (ref 0–0.5)
EOSINOPHIL NFR BLD AUTO: 0.1 % — SIGNIFICANT CHANGE UP (ref 0–6)
GLUCOSE SERPL-MCNC: 115 MG/DL — HIGH (ref 70–99)
HCT VFR BLD CALC: 38.9 % — SIGNIFICANT CHANGE UP (ref 34.5–45)
HGB BLD-MCNC: 12.9 G/DL — SIGNIFICANT CHANGE UP (ref 11.5–15.5)
IMM GRANULOCYTES NFR BLD AUTO: 0.6 % — SIGNIFICANT CHANGE UP (ref 0–1.5)
INR BLD: 1.02 RATIO — SIGNIFICANT CHANGE UP (ref 0.88–1.16)
LYMPHOCYTES # BLD AUTO: 1.13 K/UL — SIGNIFICANT CHANGE UP (ref 1–3.3)
LYMPHOCYTES # BLD AUTO: 11.1 % — LOW (ref 13–44)
MAGNESIUM SERPL-MCNC: 2.4 MG/DL — SIGNIFICANT CHANGE UP (ref 1.6–2.6)
MCHC RBC-ENTMCNC: 33.2 GM/DL — SIGNIFICANT CHANGE UP (ref 32–36)
MCHC RBC-ENTMCNC: 33.2 PG — SIGNIFICANT CHANGE UP (ref 27–34)
MCV RBC AUTO: 100 FL — SIGNIFICANT CHANGE UP (ref 80–100)
MONOCYTES # BLD AUTO: 0.42 K/UL — SIGNIFICANT CHANGE UP (ref 0–0.9)
MONOCYTES NFR BLD AUTO: 4.1 % — SIGNIFICANT CHANGE UP (ref 2–14)
NEUTROPHILS # BLD AUTO: 8.52 K/UL — HIGH (ref 1.8–7.4)
NEUTROPHILS NFR BLD AUTO: 83.8 % — HIGH (ref 43–77)
NRBC # BLD: 0 /100 WBCS — SIGNIFICANT CHANGE UP (ref 0–0)
NT-PROBNP SERPL-SCNC: 74 PG/ML — SIGNIFICANT CHANGE UP (ref 0–450)
PLATELET # BLD AUTO: 165 K/UL — SIGNIFICANT CHANGE UP (ref 150–400)
POTASSIUM SERPL-MCNC: 4 MMOL/L — SIGNIFICANT CHANGE UP (ref 3.5–5.3)
POTASSIUM SERPL-SCNC: 4 MMOL/L — SIGNIFICANT CHANGE UP (ref 3.5–5.3)
PROT SERPL-MCNC: 7 G/DL — SIGNIFICANT CHANGE UP (ref 6–8.3)
PROTHROM AB SERPL-ACNC: 11.5 SEC — SIGNIFICANT CHANGE UP (ref 10–12.9)
RBC # BLD: 3.89 M/UL — SIGNIFICANT CHANGE UP (ref 3.8–5.2)
RBC # FLD: 12.8 % — SIGNIFICANT CHANGE UP (ref 10.3–14.5)
SODIUM SERPL-SCNC: 140 MMOL/L — SIGNIFICANT CHANGE UP (ref 135–145)
TROPONIN I SERPL-MCNC: <0.015 NG/ML — SIGNIFICANT CHANGE UP (ref 0–0.04)
WBC # BLD: 10.17 K/UL — SIGNIFICANT CHANGE UP (ref 3.8–10.5)
WBC # FLD AUTO: 10.17 K/UL — SIGNIFICANT CHANGE UP (ref 3.8–10.5)

## 2020-03-25 PROCEDURE — 84484 ASSAY OF TROPONIN QUANT: CPT

## 2020-03-25 PROCEDURE — 72125 CT NECK SPINE W/O DYE: CPT

## 2020-03-25 PROCEDURE — 70450 CT HEAD/BRAIN W/O DYE: CPT

## 2020-03-25 PROCEDURE — 83735 ASSAY OF MAGNESIUM: CPT

## 2020-03-25 PROCEDURE — 82550 ASSAY OF CK (CPK): CPT

## 2020-03-25 PROCEDURE — 99284 EMERGENCY DEPT VISIT MOD MDM: CPT | Mod: 25

## 2020-03-25 PROCEDURE — 83880 ASSAY OF NATRIURETIC PEPTIDE: CPT

## 2020-03-25 PROCEDURE — 82009 KETONE BODYS QUAL: CPT

## 2020-03-25 PROCEDURE — 71045 X-RAY EXAM CHEST 1 VIEW: CPT

## 2020-03-25 PROCEDURE — 85027 COMPLETE CBC AUTOMATED: CPT

## 2020-03-25 PROCEDURE — 80053 COMPREHEN METABOLIC PANEL: CPT

## 2020-03-25 PROCEDURE — 85610 PROTHROMBIN TIME: CPT

## 2020-03-25 PROCEDURE — 70450 CT HEAD/BRAIN W/O DYE: CPT | Mod: 26

## 2020-03-25 PROCEDURE — 36415 COLL VENOUS BLD VENIPUNCTURE: CPT

## 2020-03-25 PROCEDURE — 85730 THROMBOPLASTIN TIME PARTIAL: CPT

## 2020-03-25 PROCEDURE — 72125 CT NECK SPINE W/O DYE: CPT | Mod: 26

## 2020-03-25 RX ADMIN — SODIUM CHLORIDE 125 MILLILITER(S): 9 INJECTION INTRAMUSCULAR; INTRAVENOUS; SUBCUTANEOUS at 01:03

## 2020-03-25 RX ADMIN — SODIUM CHLORIDE 1000 MILLILITER(S): 9 INJECTION INTRAMUSCULAR; INTRAVENOUS; SUBCUTANEOUS at 05:40

## 2020-03-25 NOTE — ED ADULT NURSE NOTE - NSSUHOSCREENINGYN_ED_ALL_ED
Electrodesiccation And Curettage Text: The wound bed was treated with electrodesiccation and curettage after the biopsy was performed. No - the patient is unable to be screened due to medical condition

## 2020-03-25 NOTE — ED ADULT NURSE REASSESSMENT NOTE - NS ED NURSE REASSESS COMMENT FT1
Pt. is stable at this time. No complaints voiced. No signs of distress noted. Pt. is stable for discharge.

## 2020-04-22 ENCOUNTER — INPATIENT (INPATIENT)
Facility: HOSPITAL | Age: 85
LOS: 4 days | Discharge: HOME | DRG: 480 | End: 2020-04-27
Attending: ORTHOPAEDIC SURGERY | Admitting: ORTHOPAEDIC SURGERY
Payer: MEDICARE

## 2020-04-22 ENCOUNTER — TRANSCRIPTION ENCOUNTER (OUTPATIENT)
Age: 85
End: 2020-04-22

## 2020-04-22 VITALS
DIASTOLIC BLOOD PRESSURE: 78 MMHG | OXYGEN SATURATION: 99 % | RESPIRATION RATE: 17 BRPM | TEMPERATURE: 99 F | SYSTOLIC BLOOD PRESSURE: 144 MMHG | HEART RATE: 67 BPM

## 2020-04-22 DIAGNOSIS — Z90.2 ACQUIRED ABSENCE OF LUNG [PART OF]: Chronic | ICD-10-CM

## 2020-04-22 DIAGNOSIS — F03.90 UNSPECIFIED DEMENTIA WITHOUT BEHAVIORAL DISTURBANCE: ICD-10-CM

## 2020-04-22 DIAGNOSIS — I10 ESSENTIAL (PRIMARY) HYPERTENSION: ICD-10-CM

## 2020-04-22 DIAGNOSIS — R73.03 PREDIABETES: ICD-10-CM

## 2020-04-22 DIAGNOSIS — S72.009A FRACTURE OF UNSPECIFIED PART OF NECK OF UNSPECIFIED FEMUR, INITIAL ENCOUNTER FOR CLOSED FRACTURE: ICD-10-CM

## 2020-04-22 DIAGNOSIS — E78.5 HYPERLIPIDEMIA, UNSPECIFIED: ICD-10-CM

## 2020-04-22 DIAGNOSIS — Z98.49 CATARACT EXTRACTION STATUS, UNSPECIFIED EYE: Chronic | ICD-10-CM

## 2020-04-22 DIAGNOSIS — C34.90 MALIGNANT NEOPLASM OF UNSPECIFIED PART OF UNSPECIFIED BRONCHUS OR LUNG: ICD-10-CM

## 2020-04-22 DIAGNOSIS — Z98.890 OTHER SPECIFIED POSTPROCEDURAL STATES: Chronic | ICD-10-CM

## 2020-04-22 DIAGNOSIS — S72.002A FRACTURE OF UNSPECIFIED PART OF NECK OF LEFT FEMUR, INITIAL ENCOUNTER FOR CLOSED FRACTURE: ICD-10-CM

## 2020-04-22 LAB
ALBUMIN SERPL ELPH-MCNC: 4 G/DL — SIGNIFICANT CHANGE UP (ref 3.3–5)
ALP SERPL-CCNC: 109 U/L — SIGNIFICANT CHANGE UP (ref 40–120)
ALT FLD-CCNC: 20 U/L — SIGNIFICANT CHANGE UP (ref 10–45)
ANION GAP SERPL CALC-SCNC: 14 MMOL/L — SIGNIFICANT CHANGE UP (ref 5–17)
APTT BLD: 26.1 SEC — LOW (ref 27.5–36.3)
AST SERPL-CCNC: 18 U/L — SIGNIFICANT CHANGE UP (ref 10–40)
BASOPHILS # BLD AUTO: 0.02 K/UL — SIGNIFICANT CHANGE UP (ref 0–0.2)
BASOPHILS NFR BLD AUTO: 0.3 % — SIGNIFICANT CHANGE UP (ref 0–2)
BILIRUB SERPL-MCNC: 0.4 MG/DL — SIGNIFICANT CHANGE UP (ref 0.2–1.2)
BLD GP AB SCN SERPL QL: NEGATIVE — SIGNIFICANT CHANGE UP
BUN SERPL-MCNC: 28 MG/DL — HIGH (ref 7–23)
CALCIUM SERPL-MCNC: 9.8 MG/DL — SIGNIFICANT CHANGE UP (ref 8.4–10.5)
CHLORIDE SERPL-SCNC: 102 MMOL/L — SIGNIFICANT CHANGE UP (ref 96–108)
CO2 SERPL-SCNC: 23 MMOL/L — SIGNIFICANT CHANGE UP (ref 22–31)
CREAT SERPL-MCNC: 0.89 MG/DL — SIGNIFICANT CHANGE UP (ref 0.5–1.3)
EOSINOPHIL # BLD AUTO: 0.03 K/UL — SIGNIFICANT CHANGE UP (ref 0–0.5)
EOSINOPHIL NFR BLD AUTO: 0.5 % — SIGNIFICANT CHANGE UP (ref 0–6)
GLUCOSE SERPL-MCNC: 116 MG/DL — HIGH (ref 70–99)
HCT VFR BLD CALC: 39.7 % — SIGNIFICANT CHANGE UP (ref 34.5–45)
HGB BLD-MCNC: 13.2 G/DL — SIGNIFICANT CHANGE UP (ref 11.5–15.5)
IMM GRANULOCYTES NFR BLD AUTO: 0.5 % — SIGNIFICANT CHANGE UP (ref 0–1.5)
INR BLD: 0.95 RATIO — SIGNIFICANT CHANGE UP (ref 0.88–1.16)
LYMPHOCYTES # BLD AUTO: 1.33 K/UL — SIGNIFICANT CHANGE UP (ref 1–3.3)
LYMPHOCYTES # BLD AUTO: 20.6 % — SIGNIFICANT CHANGE UP (ref 13–44)
MCHC RBC-ENTMCNC: 32.9 PG — SIGNIFICANT CHANGE UP (ref 27–34)
MCHC RBC-ENTMCNC: 33.2 GM/DL — SIGNIFICANT CHANGE UP (ref 32–36)
MCV RBC AUTO: 99 FL — SIGNIFICANT CHANGE UP (ref 80–100)
MONOCYTES # BLD AUTO: 0.42 K/UL — SIGNIFICANT CHANGE UP (ref 0–0.9)
MONOCYTES NFR BLD AUTO: 6.5 % — SIGNIFICANT CHANGE UP (ref 2–14)
NEUTROPHILS # BLD AUTO: 4.62 K/UL — SIGNIFICANT CHANGE UP (ref 1.8–7.4)
NEUTROPHILS NFR BLD AUTO: 71.6 % — SIGNIFICANT CHANGE UP (ref 43–77)
NRBC # BLD: 0 /100 WBCS — SIGNIFICANT CHANGE UP (ref 0–0)
PLATELET # BLD AUTO: 176 K/UL — SIGNIFICANT CHANGE UP (ref 150–400)
POTASSIUM SERPL-MCNC: 4.5 MMOL/L — SIGNIFICANT CHANGE UP (ref 3.5–5.3)
POTASSIUM SERPL-SCNC: 4.5 MMOL/L — SIGNIFICANT CHANGE UP (ref 3.5–5.3)
PROT SERPL-MCNC: 6.5 G/DL — SIGNIFICANT CHANGE UP (ref 6–8.3)
PROTHROM AB SERPL-ACNC: 10.9 SEC — SIGNIFICANT CHANGE UP (ref 10–12.9)
RBC # BLD: 4.01 M/UL — SIGNIFICANT CHANGE UP (ref 3.8–5.2)
RBC # FLD: 12.6 % — SIGNIFICANT CHANGE UP (ref 10.3–14.5)
RH IG SCN BLD-IMP: POSITIVE — SIGNIFICANT CHANGE UP
RH IG SCN BLD-IMP: POSITIVE — SIGNIFICANT CHANGE UP
SARS-COV-2 RNA SPEC QL NAA+PROBE: SIGNIFICANT CHANGE UP
SODIUM SERPL-SCNC: 139 MMOL/L — SIGNIFICANT CHANGE UP (ref 135–145)
WBC # BLD: 6.45 K/UL — SIGNIFICANT CHANGE UP (ref 3.8–10.5)
WBC # FLD AUTO: 6.45 K/UL — SIGNIFICANT CHANGE UP (ref 3.8–10.5)

## 2020-04-22 PROCEDURE — 73502 X-RAY EXAM HIP UNI 2-3 VIEWS: CPT | Mod: 26,LT

## 2020-04-22 PROCEDURE — 73552 X-RAY EXAM OF FEMUR 2/>: CPT | Mod: 26,LT

## 2020-04-22 PROCEDURE — 71045 X-RAY EXAM CHEST 1 VIEW: CPT | Mod: 26

## 2020-04-22 PROCEDURE — 99285 EMERGENCY DEPT VISIT HI MDM: CPT

## 2020-04-22 PROCEDURE — 93010 ELECTROCARDIOGRAM REPORT: CPT

## 2020-04-22 PROCEDURE — 73090 X-RAY EXAM OF FOREARM: CPT | Mod: 26,LT

## 2020-04-22 PROCEDURE — 73110 X-RAY EXAM OF WRIST: CPT | Mod: 26,LT

## 2020-04-22 RX ORDER — SODIUM CHLORIDE 9 MG/ML
1000 INJECTION INTRAMUSCULAR; INTRAVENOUS; SUBCUTANEOUS
Refills: 0 | Status: DISCONTINUED | OUTPATIENT
Start: 2020-04-22 | End: 2020-04-23

## 2020-04-22 RX ORDER — ACETAMINOPHEN 500 MG
975 TABLET ORAL EVERY 8 HOURS
Refills: 0 | Status: DISCONTINUED | OUTPATIENT
Start: 2020-04-22 | End: 2020-04-23

## 2020-04-22 RX ORDER — HEPARIN SODIUM 5000 [USP'U]/ML
5000 INJECTION INTRAVENOUS; SUBCUTANEOUS ONCE
Refills: 0 | Status: COMPLETED | OUTPATIENT
Start: 2020-04-22 | End: 2020-04-22

## 2020-04-22 RX ORDER — OXYCODONE HYDROCHLORIDE 5 MG/1
5 TABLET ORAL EVERY 4 HOURS
Refills: 0 | Status: DISCONTINUED | OUTPATIENT
Start: 2020-04-22 | End: 2020-04-23

## 2020-04-22 RX ORDER — LOSARTAN POTASSIUM 100 MG/1
12.5 TABLET, FILM COATED ORAL DAILY
Refills: 0 | Status: DISCONTINUED | OUTPATIENT
Start: 2020-04-22 | End: 2020-04-23

## 2020-04-22 RX ORDER — SENNA PLUS 8.6 MG/1
2 TABLET ORAL AT BEDTIME
Refills: 0 | Status: DISCONTINUED | OUTPATIENT
Start: 2020-04-22 | End: 2020-04-23

## 2020-04-22 RX ORDER — MAGNESIUM HYDROXIDE 400 MG/1
30 TABLET, CHEWABLE ORAL DAILY
Refills: 0 | Status: DISCONTINUED | OUTPATIENT
Start: 2020-04-22 | End: 2020-04-23

## 2020-04-22 RX ORDER — MORPHINE SULFATE 50 MG/1
2 CAPSULE, EXTENDED RELEASE ORAL ONCE
Refills: 0 | Status: DISCONTINUED | OUTPATIENT
Start: 2020-04-22 | End: 2020-04-22

## 2020-04-22 RX ORDER — ATORVASTATIN CALCIUM 80 MG/1
20 TABLET, FILM COATED ORAL AT BEDTIME
Refills: 0 | Status: DISCONTINUED | OUTPATIENT
Start: 2020-04-22 | End: 2020-04-23

## 2020-04-22 RX ORDER — ACETAMINOPHEN 500 MG
1000 TABLET ORAL ONCE
Refills: 0 | Status: COMPLETED | OUTPATIENT
Start: 2020-04-22 | End: 2020-04-22

## 2020-04-22 RX ORDER — OXYCODONE HYDROCHLORIDE 5 MG/1
2.5 TABLET ORAL EVERY 4 HOURS
Refills: 0 | Status: DISCONTINUED | OUTPATIENT
Start: 2020-04-22 | End: 2020-04-23

## 2020-04-22 RX ADMIN — MORPHINE SULFATE 2 MILLIGRAM(S): 50 CAPSULE, EXTENDED RELEASE ORAL at 19:19

## 2020-04-22 RX ADMIN — ATORVASTATIN CALCIUM 20 MILLIGRAM(S): 80 TABLET, FILM COATED ORAL at 22:31

## 2020-04-22 RX ADMIN — Medication 400 MILLIGRAM(S): at 20:47

## 2020-04-22 RX ADMIN — HEPARIN SODIUM 5000 UNIT(S): 5000 INJECTION INTRAVENOUS; SUBCUTANEOUS at 23:25

## 2020-04-22 RX ADMIN — MORPHINE SULFATE 2 MILLIGRAM(S): 50 CAPSULE, EXTENDED RELEASE ORAL at 20:48

## 2020-04-22 RX ADMIN — SENNA PLUS 2 TABLET(S): 8.6 TABLET ORAL at 22:31

## 2020-04-22 RX ADMIN — LOSARTAN POTASSIUM 12.5 MILLIGRAM(S): 100 TABLET, FILM COATED ORAL at 22:31

## 2020-04-22 NOTE — H&P ADULT - HISTORY OF PRESENT ILLNESS
87yFemale c/o L hip pain and L wrist pain s/p mechanical fall. Patient was dancing in her living room when she fell. Patient denies head hit or LOC. Patient denies numbness or tingling in the LLE. Patient denies numbness or tingling in LUE Patient denies any other injuries. Does not use any ambulatory assist devices at baseline.

## 2020-04-22 NOTE — H&P ADULT - ASSESSMENT
A/P: 86 y/o F presenting with left 3 part intertrochanteric hip fracture, Left distal radius fracture and left ulnar styloid fracture following mechanical fall    - Admit to orthopedics under Dr. Forrest  - Pain control  - IFEANYI BURGER in [facility] splint  -Splint precautions as discussed with patient:  Keep Splint dry  Elevate extremity, can try and ice through the Splint   Do not stick anything into the Splint  - NPO with IVF  - Consent for OR in chart  - OR tomorrow for IMN of left hip fracture  - Discussed with attending

## 2020-04-22 NOTE — ED PROVIDER NOTE - CLINICAL SUMMARY MEDICAL DECISION MAKING FREE TEXT BOX
87 F w/ PMH of HTN, HLD, prior lung sx for infection, denies cancer hx, aaox3 presents to the ED s/p mechanical fall from standing, reports she takes aspirin daily, pt has pain in the L hip and the L wrist, has 2+ radial pulse and 2+ DP pulse. She has 5/5 upper extremity strength. Concern for fracture of the hip/femur, radial fx, fall from standing. plan for imaging and likely admission admission. 87 F w/ PMH of HTN, HLD, prior lung sx (though from chart apparently 2/2 cancer pt denies cancer hx), dementia aaox3 presents to the ED s/p mechanical fall from standing, reports she takes aspirin daily, pt has pain in the L hip and the L wrist, has 2+ radial pulse and 2+ DP pulse. She has 5/5 upper extremity strength. Concern for fracture of the hip/femur, radial fx, fall from standing. plan for imaging and likely admission

## 2020-04-22 NOTE — ED ADULT NURSE NOTE - OBJECTIVE STATEMENT
87y old female PMH HTN, Lung CA over 30ys ago, HLD, Dementia BIB ems for fall. A&Ox3. Patient st 87y old female PMH HTN, Lung CA over 30ys ago, HLD, Dementia BIB ems for fall. A&Ox3. Patient states that today when she was with her daughter, she turn around, got dizzy and fell, denies LOC or head injury, c/o left leg/hip and left wrist pain. Patient denies chest pain, sob, ha, n/v/d, abdominal pain, f/c, urinary symptoms, hematuria. Patient is well appearing, gross neuro intact, lungs cta bilaterally, no difficulty speaking in complete sentences,  abdomen soft nontender nondistended, skin intact, no abrasions present, bruising and swelling noted to left wrist w/ limited motion, left leg is shortened. IV inserted, pain medication administered as per MD order.

## 2020-04-22 NOTE — H&P ADULT - NSHPLABSRESULTS_GEN_ALL_CORE
T(C): 36.7 (04-22-20 @ 19:13)  HR: 64 (04-22-20 @ 20:45)  BP: 113/61 (04-22-20 @ 20:45)  RR: 18 (04-22-20 @ 20:45)  SpO2: 100% (04-22-20 @ 20:45)  Wt(kg): --                        13.2   6.45  )-----------( 176      ( 22 Apr 2020 19:07 )             39.7     04-22    139  |  102  |  28<H>  ----------------------------<  116<H>  4.5   |  23  |  0.89    Ca    9.8      22 Apr 2020 19:07    TPro  6.5  /  Alb  4.0  /  TBili  0.4  /  DBili  x   /  AST  18  /  ALT  20  /  AlkPhos  109  04-22    PT/INR - ( 22 Apr 2020 19:07 )   PT: 10.9 sec;   INR: 0.95 ratio         PTT - ( 22 Apr 2020 19:07 )  PTT:26.1 sec    Procedure:  Under aseptic conditions, a hematoma block was administered to the left distal radius fracture site using 10cc of 1% lidocaine. Closed reduction was performed and a well molded plaster splint was applied. The patient tolerated the procedure well and there we no complications. The patient was neurovascularly intact following reduction. Post-reduction xrays demonstrated acceptable alignment.

## 2020-04-22 NOTE — H&P ADULT - NSICDXPASTMEDICALHX_GEN_ALL_CORE_FT
PAST MEDICAL HISTORY:  Dementia     Hyperlipidemia     Hypertension     Lung cancer left    Lung nodule right    Prediabetes no med

## 2020-04-22 NOTE — ED PROVIDER NOTE - CARE PLAN
Principal Discharge DX:	Closed fracture of left hip, initial encounter  Secondary Diagnosis:	Wrist fracture, closed, left, initial encounter

## 2020-04-22 NOTE — H&P ADULT - NSICDXPASTSURGICALHX_GEN_ALL_CORE_FT
PAST SURGICAL HISTORY:  History of back surgery 1990s    History of cataract surgery bilateral    S/P lobectomy of lung s/p FB, mediastinoscopy, LT VATS, COLEMAN lobectomy 8/2009

## 2020-04-22 NOTE — H&P ADULT - NSHPPHYSICALEXAM_GEN_ALL_CORE
PE  Gen: NAD, alert and oriented  Resp: Unlabored breathing  LLE: Skin intact, no ecchymosis,        SILT DP/SP/ [place]/Saph,        +EHL/FHL/TA/Gastroc,        Knee/ankle painless ROM,        hip ROM limited 2/2 pain,       DP+,        soft compartments, no calf ttp,        +log roll    LUE:  skin intact  + AIN/PIN/ulnar/radial  C5-T1 SILT  compartments soft  radial pulse 2+        Secondary:  No TTP over bony landmarks, SILT BL, ROM intact BL, distal pulses palpable.    Imaging:  XR demonstrating Left 3 part intertrochanteric hip fracture, Left distal radius fracture and ulnar styloid fracture

## 2020-04-22 NOTE — CONSULT NOTE ADULT - PROBLEM SELECTOR RECOMMENDATION 5
Patient feeling much better, ready to go home. I have reviewed discharge instructions with the patient. The patient verbalized understanding.  Patient armband removed and shredded Low fat low cholesterol diet

## 2020-04-22 NOTE — ED ADULT NURSE REASSESSMENT NOTE - NS ED NURSE REASSESS COMMENT FT1
Received report from Rochelle PEREZ. Patient resting comfortably in stretcher. A&Ox1. Patient is alert to self, does not know where she is or why she is here in the hospital. Patient in no acute distress. Denies any pain in the left wrist or left leg currently. Patient pending inpatient bed assignment. Plan of care discussed. Safety and comfort measures maintained.

## 2020-04-22 NOTE — ED PROVIDER NOTE - MUSCULOSKELETAL, MLM
No midline spinal tenderness, NEXUS negative.  Chest and pelvis non tender and stable.  Left elbow NT full ROM.  Mild tenderness over left wrist, no snuff box tenderness.  Left hand NT full ROM.  Right UE NT full ROM.  Right LE NT full ROM.  Left hip + tender, LLE short externally rotated, left knee NT full ROM.

## 2020-04-22 NOTE — ED ADULT NURSE NOTE - NSIMPLEMENTINTERV_GEN_ALL_ED
Implemented All Fall with Harm Risk Interventions:  [place] to call system. Call bell, personal items and telephone within reach. Instruct patient to call for assistance. Room bathroom lighting operational. Non-slip footwear when patient is off stretcher. Physically safe environment: no spills, clutter or unnecessary equipment. Stretcher in lowest position, wheels locked, appropriate side rails in place. Provide visual cue, wrist band, yellow gown, etc. Monitor gait and stability. Monitor for mental status changes and reorient to person, place, and time. Review medications for side effects contributing to fall risk. Reinforce activity limits and safety measures with patient and family. Provide visual clues: red socks.

## 2020-04-22 NOTE — ED PROVIDER NOTE - OBJECTIVE STATEMENT
84 yo female with hx of HTN, HLD, dementia, lung CA coming in with left hip and wrist pain after a mechanical trip and fall this afternoon.  Pt was with her daughter in the living room, spun and lost her balance falling to her left side.  Did not hit head no LOC.  No neck or back pain.  No numbness or weakness.  Pain in the left hip with deformity.  Pain is worse with movement, better with immobilization.  Similar complaints for left wrist pain.  No other complaints at this time.

## 2020-04-22 NOTE — ED ADULT NURSE REASSESSMENT NOTE - NS ED NURSE REASSESS COMMENT FT1
Pharmacy called for medication that is not available on the unit. Spoke with pharmacist states the medication will be sent to the area as soon as it is available. pending medication at this time.

## 2020-04-23 LAB
ANION GAP SERPL CALC-SCNC: 11 MMOL/L — SIGNIFICANT CHANGE UP (ref 5–17)
ANION GAP SERPL CALC-SCNC: 13 MMOL/L — SIGNIFICANT CHANGE UP (ref 5–17)
ANION GAP SERPL CALC-SCNC: 15 MMOL/L — SIGNIFICANT CHANGE UP (ref 5–17)
APTT BLD: 21.1 SEC — LOW (ref 27.5–36.3)
APTT BLD: 27 SEC — LOW (ref 27.5–36.3)
BUN SERPL-MCNC: 26 MG/DL — HIGH (ref 7–23)
BUN SERPL-MCNC: 28 MG/DL — HIGH (ref 7–23)
BUN SERPL-MCNC: 31 MG/DL — HIGH (ref 7–23)
CALCIUM SERPL-MCNC: 8.7 MG/DL — SIGNIFICANT CHANGE UP (ref 8.4–10.5)
CALCIUM SERPL-MCNC: 8.7 MG/DL — SIGNIFICANT CHANGE UP (ref 8.4–10.5)
CALCIUM SERPL-MCNC: 9.3 MG/DL — SIGNIFICANT CHANGE UP (ref 8.4–10.5)
CHLORIDE SERPL-SCNC: 104 MMOL/L — SIGNIFICANT CHANGE UP (ref 96–108)
CHLORIDE SERPL-SCNC: 107 MMOL/L — SIGNIFICANT CHANGE UP (ref 96–108)
CHLORIDE SERPL-SCNC: 107 MMOL/L — SIGNIFICANT CHANGE UP (ref 96–108)
CO2 SERPL-SCNC: 19 MMOL/L — LOW (ref 22–31)
CO2 SERPL-SCNC: 21 MMOL/L — LOW (ref 22–31)
CO2 SERPL-SCNC: 21 MMOL/L — LOW (ref 22–31)
CREAT SERPL-MCNC: 0.68 MG/DL — SIGNIFICANT CHANGE UP (ref 0.5–1.3)
CREAT SERPL-MCNC: 0.71 MG/DL — SIGNIFICANT CHANGE UP (ref 0.5–1.3)
CREAT SERPL-MCNC: 0.71 MG/DL — SIGNIFICANT CHANGE UP (ref 0.5–1.3)
GAS PNL BLDA: SIGNIFICANT CHANGE UP
GLUCOSE BLDC GLUCOMTR-MCNC: 161 MG/DL — HIGH (ref 70–99)
GLUCOSE SERPL-MCNC: 115 MG/DL — HIGH (ref 70–99)
GLUCOSE SERPL-MCNC: 142 MG/DL — HIGH (ref 70–99)
GLUCOSE SERPL-MCNC: 145 MG/DL — HIGH (ref 70–99)
HCT VFR BLD CALC: 22.1 % — LOW (ref 34.5–45)
HCT VFR BLD CALC: 28.4 % — LOW (ref 34.5–45)
HCT VFR BLD CALC: 28.5 % — LOW (ref 34.5–45)
HCT VFR BLD CALC: 32.2 % — LOW (ref 34.5–45)
HGB BLD-MCNC: 10.7 G/DL — LOW (ref 11.5–15.5)
HGB BLD-MCNC: 7.1 G/DL — LOW (ref 11.5–15.5)
HGB BLD-MCNC: 9 G/DL — LOW (ref 11.5–15.5)
HGB BLD-MCNC: 9.5 G/DL — LOW (ref 11.5–15.5)
INR BLD: 1.03 RATIO — SIGNIFICANT CHANGE UP (ref 0.88–1.16)
INR BLD: 1.09 RATIO — SIGNIFICANT CHANGE UP (ref 0.88–1.16)
MAGNESIUM SERPL-MCNC: 1.9 MG/DL — SIGNIFICANT CHANGE UP (ref 1.6–2.6)
MCHC RBC-ENTMCNC: 31.7 GM/DL — LOW (ref 32–36)
MCHC RBC-ENTMCNC: 32.1 GM/DL — SIGNIFICANT CHANGE UP (ref 32–36)
MCHC RBC-ENTMCNC: 32.1 PG — SIGNIFICANT CHANGE UP (ref 27–34)
MCHC RBC-ENTMCNC: 32.3 PG — SIGNIFICANT CHANGE UP (ref 27–34)
MCHC RBC-ENTMCNC: 32.5 PG — SIGNIFICANT CHANGE UP (ref 27–34)
MCHC RBC-ENTMCNC: 32.9 PG — SIGNIFICANT CHANGE UP (ref 27–34)
MCHC RBC-ENTMCNC: 33.2 GM/DL — SIGNIFICANT CHANGE UP (ref 32–36)
MCHC RBC-ENTMCNC: 33.3 GM/DL — SIGNIFICANT CHANGE UP (ref 32–36)
MCV RBC AUTO: 100 FL — SIGNIFICANT CHANGE UP (ref 80–100)
MCV RBC AUTO: 102.5 FL — HIGH (ref 80–100)
MCV RBC AUTO: 96.9 FL — SIGNIFICANT CHANGE UP (ref 80–100)
MCV RBC AUTO: 99.1 FL — SIGNIFICANT CHANGE UP (ref 80–100)
NRBC # BLD: 0 /100 WBCS — SIGNIFICANT CHANGE UP (ref 0–0)
PHOSPHATE SERPL-MCNC: 3.5 MG/DL — SIGNIFICANT CHANGE UP (ref 2.5–4.5)
PLATELET # BLD AUTO: 101 K/UL — LOW (ref 150–400)
PLATELET # BLD AUTO: 116 K/UL — LOW (ref 150–400)
PLATELET # BLD AUTO: 129 K/UL — LOW (ref 150–400)
PLATELET # BLD AUTO: 140 K/UL — LOW (ref 150–400)
POTASSIUM SERPL-MCNC: 4.3 MMOL/L — SIGNIFICANT CHANGE UP (ref 3.5–5.3)
POTASSIUM SERPL-MCNC: 4.5 MMOL/L — SIGNIFICANT CHANGE UP (ref 3.5–5.3)
POTASSIUM SERPL-MCNC: 4.7 MMOL/L — SIGNIFICANT CHANGE UP (ref 3.5–5.3)
POTASSIUM SERPL-SCNC: 4.3 MMOL/L — SIGNIFICANT CHANGE UP (ref 3.5–5.3)
POTASSIUM SERPL-SCNC: 4.5 MMOL/L — SIGNIFICANT CHANGE UP (ref 3.5–5.3)
POTASSIUM SERPL-SCNC: 4.7 MMOL/L — SIGNIFICANT CHANGE UP (ref 3.5–5.3)
PROTHROM AB SERPL-ACNC: 11.8 SEC — SIGNIFICANT CHANGE UP (ref 10–12.9)
PROTHROM AB SERPL-ACNC: 12.5 SEC — SIGNIFICANT CHANGE UP (ref 10–12.9)
RBC # BLD: 2.21 M/UL — LOW (ref 3.8–5.2)
RBC # BLD: 2.77 M/UL — LOW (ref 3.8–5.2)
RBC # BLD: 2.94 M/UL — LOW (ref 3.8–5.2)
RBC # BLD: 3.25 M/UL — LOW (ref 3.8–5.2)
RBC # FLD: 12.7 % — SIGNIFICANT CHANGE UP (ref 10.3–14.5)
RBC # FLD: 12.8 % — SIGNIFICANT CHANGE UP (ref 10.3–14.5)
RBC # FLD: 12.9 % — SIGNIFICANT CHANGE UP (ref 10.3–14.5)
RBC # FLD: 14 % — SIGNIFICANT CHANGE UP (ref 10.3–14.5)
SODIUM SERPL-SCNC: 139 MMOL/L — SIGNIFICANT CHANGE UP (ref 135–145)
SODIUM SERPL-SCNC: 139 MMOL/L — SIGNIFICANT CHANGE UP (ref 135–145)
SODIUM SERPL-SCNC: 140 MMOL/L — SIGNIFICANT CHANGE UP (ref 135–145)
WBC # BLD: 12.19 K/UL — HIGH (ref 3.8–10.5)
WBC # BLD: 8.76 K/UL — SIGNIFICANT CHANGE UP (ref 3.8–10.5)
WBC # BLD: 9.46 K/UL — SIGNIFICANT CHANGE UP (ref 3.8–10.5)
WBC # BLD: 9.66 K/UL — SIGNIFICANT CHANGE UP (ref 3.8–10.5)
WBC # FLD AUTO: 12.19 K/UL — HIGH (ref 3.8–10.5)
WBC # FLD AUTO: 8.76 K/UL — SIGNIFICANT CHANGE UP (ref 3.8–10.5)
WBC # FLD AUTO: 9.46 K/UL — SIGNIFICANT CHANGE UP (ref 3.8–10.5)
WBC # FLD AUTO: 9.66 K/UL — SIGNIFICANT CHANGE UP (ref 3.8–10.5)

## 2020-04-23 PROCEDURE — 73110 X-RAY EXAM OF WRIST: CPT | Mod: 26,LT

## 2020-04-23 RX ORDER — PHENYLEPHRINE HYDROCHLORIDE 10 MG/ML
0.5 INJECTION INTRAVENOUS
Qty: 160 | Refills: 0 | Status: DISCONTINUED | OUTPATIENT
Start: 2020-04-23 | End: 2020-04-23

## 2020-04-23 RX ORDER — LOSARTAN POTASSIUM 100 MG/1
50 TABLET, FILM COATED ORAL DAILY
Refills: 0 | Status: DISCONTINUED | OUTPATIENT
Start: 2020-04-23 | End: 2020-04-23

## 2020-04-23 RX ORDER — CEFAZOLIN SODIUM 1 G
2000 VIAL (EA) INJECTION EVERY 8 HOURS
Refills: 0 | Status: COMPLETED | OUTPATIENT
Start: 2020-04-23 | End: 2020-04-23

## 2020-04-23 RX ORDER — HYDROMORPHONE HYDROCHLORIDE 2 MG/ML
0.25 INJECTION INTRAMUSCULAR; INTRAVENOUS; SUBCUTANEOUS
Refills: 0 | Status: DISCONTINUED | OUTPATIENT
Start: 2020-04-23 | End: 2020-04-23

## 2020-04-23 RX ORDER — SODIUM CHLORIDE 9 MG/ML
1000 INJECTION INTRAMUSCULAR; INTRAVENOUS; SUBCUTANEOUS
Refills: 0 | Status: DISCONTINUED | OUTPATIENT
Start: 2020-04-23 | End: 2020-04-24

## 2020-04-23 RX ORDER — SENNA PLUS 8.6 MG/1
2 TABLET ORAL AT BEDTIME
Refills: 0 | Status: DISCONTINUED | OUTPATIENT
Start: 2020-04-23 | End: 2020-04-27

## 2020-04-23 RX ORDER — SODIUM CHLORIDE 9 MG/ML
100 INJECTION INTRAMUSCULAR; INTRAVENOUS; SUBCUTANEOUS ONCE
Refills: 0 | Status: COMPLETED | OUTPATIENT
Start: 2020-04-23 | End: 2020-04-23

## 2020-04-23 RX ORDER — ONDANSETRON 8 MG/1
4 TABLET, FILM COATED ORAL ONCE
Refills: 0 | Status: DISCONTINUED | OUTPATIENT
Start: 2020-04-23 | End: 2020-04-23

## 2020-04-23 RX ORDER — OXYCODONE HYDROCHLORIDE 5 MG/1
5 TABLET ORAL EVERY 4 HOURS
Refills: 0 | Status: DISCONTINUED | OUTPATIENT
Start: 2020-04-23 | End: 2020-04-23

## 2020-04-23 RX ORDER — ENOXAPARIN SODIUM 100 MG/ML
40 INJECTION SUBCUTANEOUS DAILY
Refills: 0 | Status: DISCONTINUED | OUTPATIENT
Start: 2020-04-23 | End: 2020-04-23

## 2020-04-23 RX ORDER — ENOXAPARIN SODIUM 100 MG/ML
30 INJECTION SUBCUTANEOUS EVERY 24 HOURS
Refills: 0 | Status: DISCONTINUED | OUTPATIENT
Start: 2020-04-23 | End: 2020-04-23

## 2020-04-23 RX ORDER — OXYCODONE HYDROCHLORIDE 5 MG/1
2.5 TABLET ORAL EVERY 4 HOURS
Refills: 0 | Status: DISCONTINUED | OUTPATIENT
Start: 2020-04-23 | End: 2020-04-23

## 2020-04-23 RX ORDER — CEFAZOLIN SODIUM 1 G
2000 VIAL (EA) INJECTION EVERY 8 HOURS
Refills: 0 | Status: DISCONTINUED | OUTPATIENT
Start: 2020-04-23 | End: 2020-04-23

## 2020-04-23 RX ORDER — AMLODIPINE BESYLATE 2.5 MG/1
5 TABLET ORAL DAILY
Refills: 0 | Status: DISCONTINUED | OUTPATIENT
Start: 2020-04-23 | End: 2020-04-23

## 2020-04-23 RX ORDER — ACETAMINOPHEN 500 MG
975 TABLET ORAL EVERY 8 HOURS
Refills: 0 | Status: DISCONTINUED | OUTPATIENT
Start: 2020-04-23 | End: 2020-04-27

## 2020-04-23 RX ORDER — ATORVASTATIN CALCIUM 80 MG/1
20 TABLET, FILM COATED ORAL AT BEDTIME
Refills: 0 | Status: DISCONTINUED | OUTPATIENT
Start: 2020-04-23 | End: 2020-04-27

## 2020-04-23 RX ORDER — LANOLIN ALCOHOL/MO/W.PET/CERES
3 CREAM (GRAM) TOPICAL AT BEDTIME
Refills: 0 | Status: DISCONTINUED | OUTPATIENT
Start: 2020-04-23 | End: 2020-04-27

## 2020-04-23 RX ORDER — PHENYLEPHRINE HYDROCHLORIDE 10 MG/ML
0.5 INJECTION INTRAVENOUS
Qty: 40 | Refills: 0 | Status: DISCONTINUED | OUTPATIENT
Start: 2020-04-23 | End: 2020-04-23

## 2020-04-23 RX ADMIN — Medication 3 MILLIGRAM(S): at 23:02

## 2020-04-23 RX ADMIN — ATORVASTATIN CALCIUM 20 MILLIGRAM(S): 80 TABLET, FILM COATED ORAL at 23:02

## 2020-04-23 RX ADMIN — HYDROMORPHONE HYDROCHLORIDE 0.25 MILLIGRAM(S): 2 INJECTION INTRAMUSCULAR; INTRAVENOUS; SUBCUTANEOUS at 14:10

## 2020-04-23 RX ADMIN — SENNA PLUS 2 TABLET(S): 8.6 TABLET ORAL at 23:02

## 2020-04-23 RX ADMIN — SODIUM CHLORIDE 100 MILLILITER(S): 9 INJECTION INTRAMUSCULAR; INTRAVENOUS; SUBCUTANEOUS at 17:26

## 2020-04-23 RX ADMIN — SODIUM CHLORIDE 300 MILLILITER(S): 9 INJECTION INTRAMUSCULAR; INTRAVENOUS; SUBCUTANEOUS at 13:30

## 2020-04-23 RX ADMIN — PHENYLEPHRINE HYDROCHLORIDE 5.32 MICROGRAM(S)/KG/MIN: 10 INJECTION INTRAVENOUS at 11:24

## 2020-04-23 RX ADMIN — PHENYLEPHRINE HYDROCHLORIDE 10.6 MICROGRAM(S)/KG/MIN: 10 INJECTION INTRAVENOUS at 11:47

## 2020-04-23 RX ADMIN — Medication 100 MILLIGRAM(S): at 15:26

## 2020-04-23 RX ADMIN — Medication 100 MILLIGRAM(S): at 23:47

## 2020-04-23 RX ADMIN — Medication 975 MILLIGRAM(S): at 11:45

## 2020-04-23 RX ADMIN — Medication 975 MILLIGRAM(S): at 23:02

## 2020-04-23 RX ADMIN — SODIUM CHLORIDE 125 MILLILITER(S): 9 INJECTION INTRAMUSCULAR; INTRAVENOUS; SUBCUTANEOUS at 00:00

## 2020-04-23 NOTE — PROGRESS NOTE ADULT - SUBJECTIVE AND OBJECTIVE BOX
Patient is a 87y old  Female who presents with a chief complaint of hip pain /injury  	      HPI:  86 yo female with hx of HTN, HLD, dementia, lung CA coming in with left hip and wrist pain after a mechanical trip and fall this afternoon.  Pt was with her daughter in the living room, spun and lost her balance falling to her left side.  Did not hit head no LOC.  No neck or back pain.  No numbness or weakness.  Pain in the left hip with deformity.  Pain is worse with movement, better with immobilization.  Similar complaints for left wrist pain.  No other complaints at this time.  Hip fracture 23-Apr-2020 07:34:46  Juve Cabello.  ·  POST-OP DIAGNOSIS:  Hip fracture 23-Apr-2020 07:35:01  Juve Cabello  ·  PROCEDURES:  ORIF, hip, with intramedullary deidra 23-Apr-2020 07:34:35  Juve Cabello.    No sob or chest pain   NO fever or chills  stressed incentive spirometry    Home Medications:  acetaminophen 325 mg oral tablet: 2 tab(s) orally every 6 hours (19 Jan 2018 16:34)  amLODIPine 5 mg oral tablet: 1 tab(s) orally once a day in am (17 Jan 2018 07:32)  aspirin 81 mg oral tablet: 1 tab(s) orally once a day in am   (19 Jan 2018 16:34)  Crestor 5 mg oral tablet: 1 tab(s) orally once a day (at bedtime) (17 Jan 2018 07:32)  docusate sodium 100 mg oral capsule: 1 cap(s) orally 3 times a day (19 Jan 2018 16:34)  losartan 50 mg oral tablet: 1 tab(s) orally once a day in pm (17 Jan 2018 07:32)  senna oral tablet: 2 tab(s) orally once a day (at bedtime) (19 Jan 2018 16:34)  Vitamin D3 1000 intl units oral capsule: 1 cap(s) orally 2 times a day (17 Jan 2018 07:32)        Allergies    No Known Allergies    PAST MEDICAL HISTORY:  Prediabetes  Lung nodule  Lung cancer  Dementia  Hypertension  Hyperlipidemia    PAST SURGICAL HISTORY:  History of cataract surgery  History of back surgery  S/P lobectomy of lung        Vital Signs Last 24 Hrs  T(C): 36.1 (23 Apr 2020 09:00), Max: 37.1 (22 Apr 2020 18:39)  T(F): 97 (23 Apr 2020 09:00), Max: 98.7 (22 Apr 2020 18:39)  HR: 56 (23 Apr 2020 09:55) (44 - 67)  BP: 100/53 (23 Apr 2020 09:55) (64/54 - 158/68)  BP(mean): 745 (23 Apr 2020 09:55) (60 - 745)  RR: 12 (23 Apr 2020 09:55) (12 - 18)  SpO2: 100% (23 Apr 2020 09:55) (99% - 100%)      PHYSICAL EXAM:  GENERAL: NAD, well nourished and conversant  HEAD:  Atraumatic  EYES: EOM, PERRLA, conjunctiva pink and sclera white  ENT: No tonsillar erythema, exudates, or enlargement, moist mucous membranes, good dentition, no lesions  NECK: Supple, No JVD, normal thyroid, carotids with normal upstrokes and no bruits  CHEST/LUNG: Clear to auscultation bilaterally, No rales, rhonchi, wheezing, or rubs  HEART: Regular rate and rhythm, No murmurs, rubs, or gallops  ABDOMEN: Soft, nondistended, no masses, guarding, tenderness or rebound, bowel sounds present  EXTREMITIES:  2+ Peripheral Pulses, No clubbing, cyanosis, or edema.  Left hip fracture(+) IM deidra  LYMPH: No lymphadenopathy noted  SKIN: No rashes or lesions  NERVOUS SYSTEM:  Alert & Oriented X3, normal cognitive function. Motor Strength 5/5 right upper and right lower.  5/5 left upper and left lower extremities, DTRs 2+ intact and symmetric    LABS:    EKG:  nsr no ischemic changes            CBC Full  -  ( 23 Apr 2020 05:41 )  WBC Count : 8.76 K/uL  RBC Count : 3.25 M/uL  Hemoglobin : 10.7 g/dL  Hematocrit : 32.2 %  Platelet Count - Automated : 140 K/uL  Mean Cell Volume : 99.1 fl  Mean Cell Hemoglobin : 32.9 pg  Mean Cell Hemoglobin Concentration : 33.2 gm/dL  Auto Neutrophil # : x  Auto Lymphocyte # : x  Auto Monocyte # : x  Auto Eosinophil # : x  Auto Basophil # : x  Auto Neutrophil % : x  Auto Lymphocyte % : x  Auto Monocyte % : x  Auto Eosinophil % : x  Auto Basophil % : x    04-23    140  |  104  |  28<H>  ----------------------------<  115<H>  4.5   |  21<L>  |  0.71    Ca    9.3      23 Apr 2020 05:41    TPro  6.5  /  Alb  4.0  /  TBili  0.4  /  DBili  x   /  AST  18  /  ALT  20  /  AlkPhos  109  04-22    LIVER FUNCTIONS - ( 22 Apr 2020 19:07 )  Alb: 4.0 g/dL / Pro: 6.5 g/dL / ALK PHOS: 109 U/L / ALT: 20 U/L / AST: 18 U/L / GGT: x           PT/INR - ( 23 Apr 2020 05:41 )   PT: 11.8 sec;   INR: 1.03 ratio         PTT - ( 23 Apr 2020 05:41 )  PTT:27.0 sec        CBC Full  -  ( 22 Apr 2020 19:07 )  WBC Count : 6.45 K/uL  RBC Count : 4.01 M/uL  Hemoglobin : 13.2 g/dL  Hematocrit : 39.7 %  Platelet Count - Automated : 176 K/uL  Mean Cell Volume : 99.0 fl  Mean Cell Hemoglobin : 32.9 pg  Mean Cell Hemoglobin Concentration : 33.2 gm/dL  Auto Neutrophil # : 4.62 K/uL  Auto Lymphocyte # : 1.33 K/uL  Auto Monocyte # : 0.42 K/uL  Auto Eosinophil # : 0.03 K/uL  Auto Basophil # : 0.02 K/uL  Auto Neutrophil % : 71.6 %  Auto Lymphocyte % : 20.6 %  Auto Monocyte % : 6.5 %  Auto Eosinophil % : 0.5 %  Auto Basophil % : 0.3 %    04-22    139  |  102  |  28<H>  ----------------------------<  116<H>  4.5   |  23  |  0.89    Ca    9.8      22 Apr 2020 19:07    TPro  6.5  /  Alb  4.0  /  TBili  0.4  /  DBili  x   /  AST  18  /  ALT  20  /  AlkPhos  109  04-22    LIVER FUNCTIONS - ( 22 Apr 2020 19:07 )  Alb: 4.0 g/dL / Pro: 6.5 g/dL / ALK PHOS: 109 U/L / ALT: 20 U/L / AST: 18 U/L / GGT: x           PT/INR - ( 22 Apr 2020 19:07 )   PT: 10.9 sec;   INR: 0.95 ratio         PTT - ( 22 Apr 2020 19:07 )  PTT:26.1 sec    CAPILLARY BLOOD GLUCOSE          RADIOLOGY & ADDITIONAL TESTS:    Consultant(s):    Care Discussed with Consultants/Other Providers [ ] YES  [ ] NO

## 2020-04-23 NOTE — PROGRESS NOTE ADULT - ASSESSMENT
A/P: 87F with L IT fx and L  fx    OR today for L hip IMN  Neuro: Pain control  Resp: IS  GI: NPO/IVF  MSK: IFEANYI LEONGE, TAO in splint  Heme: DVT PPX: Venodynes  FU AM labs (sent to lab)  Cleared  Consented (son: Stoney Riggs)

## 2020-04-23 NOTE — RAPID RESPONSE TEAM SUMMARY - NSSITUATIONBACKGROUNDRRT_GEN_ALL_CORE
85 year old woman with hx of HTN, HLD, dementia, lung CA coming in with left hip and wrist pain after a mechanical trip and fall  S/p  L IM Nail. Now with unresponsiveness while on commode. Pt improved. BP remained on the softer side, was on [place] all day and was weaned off as per team. Left Lower Extremity more taunt as per staff.

## 2020-04-23 NOTE — CONSULT NOTE ADULT - ASSESSMENT
86 yo female with hx of HTN, HLD, dementia, lung CA coming in with left hip and wrist pain after a mechanical trip and fall this afternoon.  Pt was with her daughter in the living room, spun and lost her balance falling to her left side.  Did not hit head no LOC.  No neck or back pain.  No numbness or weakness.  Pain in the left hip with deformity.  Pain is worse with movement, better with immobilization.  Similar complaints for left wrist pain.  No other complaints at this time    Patient is medically stable to proceed to the OR as planned .
ASSESSMENT:  87 yr old female with Lt IT femur fx, now s/p Lt hip IMN 4/23, admitted to SICU for monitoring of hypotension and AMS postoperatively    PLAN:    Neurologic:   - C/w Tylenol, prn Oxy for Pain control  - melatonin qhs    Respiratory:   - Monitor respiratory status  - OOBTC daily, encourage use of incentive spirometer    Cardiovascular:   - HDS off pressors on arrival to SICU  - Monitor vitals per routine  - C/w home atorvastatin  - Restart home anti-HTN meds (Norvasc, Losartan) once persistently stable off pressors    Gastrointestinal/Nutrition:   - C/w regular diet as tolerated  - Monitor bowel function    Renal/Genitourinary:   - Monitor urine output  - Trend electrolytes, replete as needed    Hematologic:   - Holding DVT ppx for Hct drop, hematoma on surgical site  - Trend H/H, transfuse as needed    Infectious Disease:   - s/p Ancef x3 perioperatively  - Monitor for fevers  - Monitor WBC    Endocrine:   - Monitor BMP glucose    Disposition:   Continued critical care management    Juvencio Renae, PGY 2  [id number]

## 2020-04-23 NOTE — PROGRESS NOTE ADULT - ASSESSMENT
84 yo female with hx of HTN, HLD, dementia, lung CA coming in with left hip and wrist pain after a mechanical trip and fall this afternoon.  Pt was with her daughter in the living room, spun and lost her balance falling to her left side.  Did not hit head no LOC.  No neck or back pain.  No numbness or weakness.  Pain in the left hip with deformity.  Pain is worse with movement, better with immobilization.  Similar complaints for left wrist pain.  No other complaints at this time    Tolerated surgery well  Vigorous incentive spirometry.

## 2020-04-23 NOTE — CHART NOTE - NSCHARTNOTEFT_GEN_A_CORE
Received call from sameday that patient had become unresponsive while trying to use a commode and an RRT was called.     Assessed patient at beside, dressings C/D/I with mild thigh swelling noted. Ice applied to the site.  Vitals stable off of fadi drip, CBC and ABG sent off by the RRT team.  1U of PRBCs ordered for a postoperative drop in H&H. Will continue to monitor.    Queta Caruso PA-C  Team Pager #[phone] Received call from [facility] that patient had become unresponsive while trying to use a commode and an RRT was called.     Assessed patient at beside, dressings C/D/I with mild thigh swelling noted. Ice applied to the site.  Vitals stable off of fadi drip, CBC and ABG sent off by the RRT team.  1U of PRBCs ordered for a postoperative drop in H&H. Will continue to monitor.    Queta Caruso PA-C  Team Pager #[phone] Received call from [facility] that patient had become unresponsive while trying to use a commode and an RRT was called.     Assessed patient at beside, dressings C/D/I with mild thigh swelling noted. Ice applied to the site.  Vitals stable off of fadi drip and patient's mentation returned to baseline, CBC and ABG sent off by the RRT team.  1U of PRBCs ordered for a postoperative drop in H&H. Will continue to monitor.    Queta Caruso PA-C  Team Pager #[phone] Received call from [facility] that patient had become unresponsive while trying to use a commode and an RRT was called.     Assessed patient at beside, dressings C/D/I with mild thigh swelling noted. Ice applied to the site.  Vitals stable off of fadi drip and patient's mentation returned to baseline, CBC and ABG sent off by the RRT team. 1U of PRBCs ordered for a postoperative drop in H&H. Will continue to monitor.    Queta Caruso PA-C  Team Pager #[phone]

## 2020-04-23 NOTE — CONSULT NOTE ADULT - SUBJECTIVE AND OBJECTIVE BOX
Patient is a 87y old  Female who presents with a chief complaint of hip pain /injury  	      HPI:  84 yo female with hx of HTN, HLD, dementia, lung CA coming in with left hip and wrist pain after a mechanical trip and fall this afternoon.  Pt was with her daughter in the living room, spun and lost her balance falling to her left side.  Did not hit head no LOC.  No neck or back pain.  No numbness or weakness.  Pain in the left hip with deformity.  Pain is worse with movement, better with immobilization.  Similar complaints for left wrist pain.  No other complaints at this time    Home Medications:  acetaminophen 325 mg oral tablet: 2 tab(s) orally every 6 hours (19 Jan 2018 16:34)  amLODIPine 5 mg oral tablet: 1 tab(s) orally once a day in am (17 Jan 2018 07:32)  aspirin 81 mg oral tablet: 1 tab(s) orally once a day in am   (19 Jan 2018 16:34)  Crestor 5 mg oral tablet: 1 tab(s) orally once a day (at bedtime) (17 Jan 2018 07:32)  docusate sodium 100 mg oral capsule: 1 cap(s) orally 3 times a day (19 Jan 2018 16:34)  losartan 50 mg oral tablet: 1 tab(s) orally once a day in pm (17 Jan 2018 07:32)  senna oral tablet: 2 tab(s) orally once a day (at bedtime) (19 Jan 2018 16:34)  Vitamin D3 1000 intl units oral capsule: 1 cap(s) orally 2 times a day (17 Jan 2018 07:32)        Allergies    No Known Allergies    PAST MEDICAL HISTORY:  Prediabetes  Lung nodule  Lung cancer  Dementia  Hypertension  Hyperlipidemia    PAST SURGICAL HISTORY:  History of cataract surgery  History of back surgery  S/P lobectomy of lung      Diet:  (   ) Regular   ( x  ) Low Sodium   (   ) Low Cholesterol   (   ) Diabetic   (   ) Other    FAMILY HISTORY:  No pertinent family history in first degree relatives      SOCIAL HISTORY:    Substance Use: (x  ) never used  (  ) other:  Tobacco Usage:  ( x  ) never smoked   (   ) former smoker   (   ) current smoker  (   ) pack years  (   ) last cigarette date  Alcohol Usage:  social alcohol  Advanced Directives: (   ) None    (   ) DNR    (   ) DNI    (   ) Health Care Proxy:     REVIEW OF SYSTEM:  CONSTITUTIONAL: No fever, No change in weight, No fatigue  HEAD: No headache, No dizziness, No recent trauma  EYES: No eye pain, No visual disturbances, No discharge  ENT:  No difficulty hearing, No tinnitus, No vertigo, No sinus pain, No throat pain  NECK: No pain, No stiffness  BREASTS: No pain, No masses, No nipple discharge  RESPIRATORY: No cough, No wheezing, No chills, No hemoptysis, No shortness of breath at rest or exertional shortness of breath  CARDIOVASCULAR: No chest pain, No palpitations, No dizziness, No CHF, No arrhythmia, No cardiomegaly, No leg swelling  GASTROINTESTINAL: No abdominal, No epigastric pain. No nausea, No vomiting, No hematemesis, No diarrhea, No constipation. No melena, No hematochezia. No GERD  GENITOURINARY: No dysuria, No frequency, No hematuria, No incontinence, No nocturia, No hesitancy,  SKIN: No itching, No burning, No rashes, No lesions   LYMPH NODES: No history of enlarged glands  ENDOCRINE: No heat or cold intolerance, No hair loss. No osteoporosis, No thyroid disease  MUSCULOSKELETAL: No joint pain or swelling, No muscle, back, or extremity pain  (+) left hip pain  PSYCHIATRIC: No depression, No anxiety, No mood swings, No difficulty sleeping  HEME/LYMPH: No easy bruising, No anticoagulants, No bleeding disorder, No bleeding gums  ALLERGY AND IMMUNOLOGIC: No hives, No eczema  NEUROLOGICAL: No memory loss, No loss of strength, No numbness, No tremors    VITALS:  Vital Signs Last 24 Hrs  T(C): 36.7 (22 Apr 2020 19:13), Max: 37.1 (22 Apr 2020 18:39)  T(F): 98 (22 Apr 2020 19:13), Max: 98.7 (22 Apr 2020 18:39)  HR: 64 (22 Apr 2020 20:45) (64 - 67)  BP: 113/61 (22 Apr 2020 20:45) (113/61 - 157/78)  BP(mean): --  RR: 18 (22 Apr 2020 20:45) (17 - 18)  SpO2: 100% (22 Apr 2020 20:45) (99% - 100%)  I&O's Summary      PHYSICAL EXAM:  GENERAL: NAD, well nourished and conversant  HEAD:  Atraumatic  EYES: EOM, PERRLA, conjunctiva pink and sclera white  ENT: No tonsillar erythema, exudates, or enlargement, moist mucous membranes, good dentition, no lesions  NECK: Supple, No JVD, normal thyroid, carotids with normal upstrokes and no bruits  CHEST/LUNG: Clear to auscultation bilaterally, No rales, rhonchi, wheezing, or rubs  HEART: Regular rate and rhythm, No murmurs, rubs, or gallops  ABDOMEN: Soft, nondistended, no masses, guarding, tenderness or rebound, bowel sounds present  EXTREMITIES:  2+ Peripheral Pulses, No clubbing, cyanosis, or edema.  Left hip fracture  LYMPH: No lymphadenopathy noted  SKIN: No rashes or lesions  NERVOUS SYSTEM:  Alert & Oriented X3, normal cognitive function. Motor Strength 5/5 right upper and right lower.  5/5 left upper and left lower extremities, DTRs 2+ intact and symmetric    LABS:    EKG:  nsr no ischemic changes    CBC Full  -  ( 22 Apr 2020 19:07 )  WBC Count : 6.45 K/uL  RBC Count : 4.01 M/uL  Hemoglobin : 13.2 g/dL  Hematocrit : 39.7 %  Platelet Count - Automated : 176 K/uL  Mean Cell Volume : 99.0 fl  Mean Cell Hemoglobin : 32.9 pg  Mean Cell Hemoglobin Concentration : 33.2 gm/dL  Auto Neutrophil # : 4.62 K/uL  Auto Lymphocyte # : 1.33 K/uL  Auto Monocyte # : 0.42 K/uL  Auto Eosinophil # : 0.03 K/uL  Auto Basophil # : 0.02 K/uL  Auto Neutrophil % : 71.6 %  Auto Lymphocyte % : 20.6 %  Auto Monocyte % : 6.5 %  Auto Eosinophil % : 0.5 %  Auto Basophil % : 0.3 %    04-22    139  |  102  |  28<H>  ----------------------------<  116<H>  4.5   |  23  |  0.89    Ca    9.8      22 Apr 2020 19:07    TPro  6.5  /  Alb  4.0  /  TBili  0.4  /  DBili  x   /  AST  18  /  ALT  20  /  AlkPhos  109  04-22    LIVER FUNCTIONS - ( 22 Apr 2020 19:07 )  Alb: 4.0 g/dL / Pro: 6.5 g/dL / ALK PHOS: 109 U/L / ALT: 20 U/L / AST: 18 U/L / GGT: x           PT/INR - ( 22 Apr 2020 19:07 )   PT: 10.9 sec;   INR: 0.95 ratio         PTT - ( 22 Apr 2020 19:07 )  PTT:26.1 sec    CAPILLARY BLOOD GLUCOSE          RADIOLOGY & ADDITIONAL TESTS:    Consultant(s):    Care Discussed with Consultants/Other Providers [ ] YES  [ ] NO
SICU CONSULT NOTE    HPI  ALEXANDRA ZUNIGA is a 87 yr old Female presented 4/22 c/o L hip pain and L wrist pain s/p mechanical fall. Patient was dancing in her living room when she fell. Patient denies head hit or LOC. Pt found to have acute Lt intertrochanteric femur fracture. On 4/23 pt underwent Lt. hip IMN. Postoperatively pt had a vasovagal episode while using the bathroom in pacu, became unresponsive briefly after which she stabilized. At this time pt was also requiring phenylephrine for hypotension and was noted to have a drop in H/H on repeat labs. Pt given 1U PRBC and SICU consulted for monitoring for hemorrhagic shock.      Surgery Information: Lt hip IMN  Case Duration: 1.5h	EBL: 150mL	IV Fluids: 300mL 	Blood Products: None	Urine Output: Not measured  Complications: None    PAST MEDICAL HISTORY: Prediabetes  Lung nodule  Lung cancer  Dementia  Hypertension  Hyperlipidemia      PAST SURGICAL HISTORY: History of cataract surgery  History of back surgery  S/P lobectomy of lung      FAMILY HISTORY: No pertinent family history in first degree relatives    SOCIAL HISTORY:  Denies EtOH  Denies smoking hx    CODE STATUS:   Full code    HOME MEDICATIONS:  acetaminophen 325 mg oral tablet: 2 tab(s) orally every 6 hours (19 Jan 2018 16:34)  amLODIPine 5 mg oral tablet: 1 tab(s) orally once a day in am (17 Jan 2018 07:32)  aspirin 81 mg oral tablet: 1 tab(s) orally once a day in am   (19 Jan 2018 16:34)  Crestor 5 mg oral tablet: 1 tab(s) orally once a day (at bedtime) (17 Jan 2018 07:32)  docusate sodium 100 mg oral capsule: 1 cap(s) orally 3 times a day (19 Jan 2018 16:34)  losartan 50 mg oral tablet: 1 tab(s) orally once a day in pm (17 Jan 2018 07:32)  senna oral tablet: 2 tab(s) orally once a day (at bedtime) (19 Jan 2018 16:34)  Vitamin D3 1000 intl units oral capsule: 1 cap(s) orally 2 times a day (17 Jan 2018 07:32)      ALLERGIES: No Known Allergies      VITAL SIGNS:  ICU Vital Signs Last 24 Hrs  T(C): 36.9 (23 Apr 2020 23:00), Max: 37.3 (23 Apr 2020 13:00)  T(F): 98.4 (23 Apr 2020 23:00), Max: 99.1 (23 Apr 2020 13:00)  HR: 74 (24 Apr 2020 01:00) (44 - 95)  BP: 99/49 (24 Apr 2020 01:00) (58/38 - 158/68)  BP(mean): 69 (24 Apr 2020 01:00) (41 - 103)  ABP: --  ABP(mean): --  RR: 29 (24 Apr 2020 01:00) (12 - 30)  SpO2: 100% (24 Apr 2020 01:00) (98% - 100%)      NEURO  Exam: GCS 15, AAOx3, No focal Deficits   acetaminophen   Tablet .. 975 milliGRAM(s) Oral every 8 hours PRN Mild Pain (1 - 3)  melatonin 3 milliGRAM(s) Oral at bedtime PRN Insomnia      RESPIRATORY  ABG - ( 23 Apr 2020 16:15 )  pH: 7.41  /  pCO2: 33    /  pO2: 190   / HCO3: 20    / Base Excess: -3.2  /  SaO2: 99      Lactate: x      Exam: Nonlabored, CTABL, no wheezes, ronchi, or rales. Normal respiratory effort.       CARDIOVASCULAR  Exam: Normotensive, RRR, no M/R/G   Cardiac Rhythm: NSR      GI/NUTRITION  Exam: Abdomen soft, NT/ND, no rebound or guarding   Diet: Regular diet  senna 2 Tablet(s) Oral at bedtime      GENITOURINARY/RENAL  magnesium sulfate  IVPB 2 Gram(s) IV Intermittent once  sodium chloride 0.9%. 1000 milliLiter(s) IV Continuous <Continuous>      04-22 @ 07:01  -  04-23 @ 07:00  --------------------------------------------------------  IN:    sodium chloride 0.9%: 875 mL  Total IN: 875 mL    OUT:  Total OUT: 0 mL    Total NET: 875 mL      04-23 @ 07:01  -  04-24 @ 01:30  --------------------------------------------------------  IN:    IV PiggyBack: 150 mL    Oral Fluid: 390 mL    Packed Red Blood Cells: 294 mL    phenylephrine   Infusion: 15 mL    phenylephrine   Infusion: 45 mL    Sodium Chloride 0.9% IV Bolus: 100 mL    sodium chloride 0.9%.: 1800 mL  Total IN: 2794 mL    OUT:    Indwelling Catheter - Urethral: 530 mL    Voided: 200 mL  Total OUT: 730 mL    Total NET: 2064 mL        Weight (kg): 56.7 (04-23 @ 08:03)  04-23    139  |  107  |  31<H>  ----------------------------<  145<H>  4.3   |  21<L>  |  0.71    Ca    8.7      23 Apr 2020 22:23  Phos  3.5     04-23  Mg     1.9     04-23    TPro  6.5  /  Alb  4.0  /  TBili  0.4  /  DBili  x   /  AST  18  /  ALT  20  /  AlkPhos  109  04-22    [ ] Heller catheter, indication: urine output monitoring in critically ill patient    HEMATOLOGIC  [ X] VTE Prophylaxis: SCDs                          9.5    9.66  )-----------( 101      ( 23 Apr 2020 22:23 )             28.5     PT/INR - ( 23 Apr 2020 22:23 )   PT: 12.5 sec;   INR: 1.09 ratio         PTT - ( 23 Apr 2020 22:23 )  PTT:21.1 sec  Transfusion: [ ] PRBC	[ ] Platelets	[ ] FFP	[ ] Cryoprecipitate      INFECTIOUS DISEASES    RECENT CULTURES:      ENDOCRINE  atorvastatin 20 milliGRAM(s) Oral at bedtime    CAPILLARY BLOOD GLUCOSE      POCT Blood Glucose.: 161 mg/dL (23 Apr 2020 15:54)      PATIENT CARE ACCESS DEVICES:  [ ] Peripheral IV  [ ] Central Venous Line	[ ] R	[ ] L	[ ] IJ	[ ] Fem	[ ] SC	Placed:   [ ] Arterial Line		[ ] R	[ ] L	[ ] Fem	[ ] Rad	[ ] Ax	Placed:   [ ] PICC:					[ ] Mediport  [ ] Urinary Catheter, Date Placed:   [x] Necessity of urinary, arterial, and venous catheters discussed    OTHER MEDICATIONS:     IMAGING STUDIES:  < from: Xray Femur 2 Views, Left (04.22.20 @ 19:57) >    EXAM:  XR FEMUR 2 VIEWS LT                          EXAM:  XR HIP WITH PELV 2-3V LT                            PROCEDURE DATE:  04/22/2020            INTERPRETATION:  CLINICAL INFORMATION: Status post left hip injury, left hip pain.    EXAM: Frontal view of the pelvis, 2 views of the left hip and 2 views of left femur with prior left knee radiographic comparison from 2/9/2012.    FINDINGS:  There is an acute, comminuted, intertrochanteric fracture of the left femur with varus angulation of the fracture fragments.    Moderate degenerative arthrosis of the bilateral hip joints. Moderate degenerative changes of the partially imaged lumbar spine. There is tricompartmental arthrosis of the knee. There is chondrocalcinosis.    IMPRESSION:  There is an acute, comminuted, intertrochanteric fracture of the left femur with varus angulation of the fracture fragments.                      BELKYS VELARDE M.D., RADIOLOGY RESIDENT  This document has been electronically signed.  MARK DE LOS SANTOS M.D., ATTENDING RADIOLOGIST  This document has been electronically signed. Apr 22 2020  9:03PM                < end of copied text >

## 2020-04-23 NOTE — CHART NOTE - NSCHARTNOTEFT_GEN_A_CORE
Resting without complaints.  No Chest Pain, SOB, N/V.    T(C): 36.1 (04-23-20 @ 09:00), Max: 37.1 (04-22-20 @ 18:39)  HR: 71 (04-23-20 @ 10:15) (44 - 71)  BP: 107/63 (04-23-20 @ 10:15) (64/54 - 158/68)  RR: 17 (04-23-20 @ 10:15) (12 - 18)  SpO2: 100% (04-23-20 @ 10:15) (99% - 100%)  Wt(kg): --    Exam:  Alert and oriented, no acute distress  LUE splint changed to cast at bedside  Laterality: LLE hip dressing in place, C/D/I  Calves soft, non-tender bilaterally  +PF/DF/EHL/FHL  SILT  +DP pulse    Xray: in chart                          10.7   8.76  )-----------( 140      ( 23 Apr 2020 05:41 )             32.2    04-23    140  |  104  |  28<H>  ----------------------------<  115<H>  4.5   |  21<L>  |  0.71    Ca    9.3      23 Apr 2020 05:41    TPro  6.5  /  Alb  4.0  /  TBili  0.4  /  DBili  x   /  AST  18  /  ALT  20  /  AlkPhos  109  04-22      A/P: 87yFemale S/p  L IM Nail. VSS. NAD  -PT/OT-WBAT LUE/LLE, OOB when tolerated  -IS encouraged  -DVT PPx with lovenox  -Followup AM labs  -Pain Control  -SDA to floor    Queta Caruso PA-C  Team Pager #[phone] Resting comfortably without complaints.  No Chest Pain, SOB, N/V.    T(C): 36.1 (04-23-20 @ 09:00), Max: 37.1 (04-22-20 @ 18:39)  HR: 71 (04-23-20 @ 10:15) (44 - 71)  BP: 107/63 (04-23-20 @ 10:15) (64/54 - 158/68)  RR: 17 (04-23-20 @ 10:15) (12 - 18)  SpO2: 100% (04-23-20 @ 10:15) (99% - 100%)  Wt(kg): --    Exam:  Alert and oriented to her name, no acute distress  LUE splint changed to cast at bedside  Laterality: LLE hip dressing in place, C/D/I  Calves soft, non-tender bilaterally  +PF/DF/EHL/FHL  SILT  +DP pulse    Xray: in chart                          10.7   8.76  )-----------( 140      ( 23 Apr 2020 05:41 )             32.2    04-23    140  |  104  |  28<H>  ----------------------------<  115<H>  4.5   |  21<L>  |  0.71    Ca    9.3      23 Apr 2020 05:41    TPro  6.5  /  Alb  4.0  /  TBili  0.4  /  DBili  x   /  AST  18  /  ALT  20  /  AlkPhos  109  04-22      A/P: 87yFemale S/p  L IM Nail. VSS. NAD  -PT/OT-WBAT LUE/LLE, OOB when tolerated  -IS encouraged  -DVT PPx with lovenox  -Followup AM labs  -Pain Control  -SDA to floor    Queta Caruso PA-C  Team Pager #[phone]

## 2020-04-23 NOTE — PRE-ANESTHESIA EVALUATION ADULT - NSANTHPMHFT_GEN_ALL_CORE
chart and med note reviewed. care and plan dw son. hx bl upper lobe resections. apparently ambulates at baseline without cp. ekg sr. no signs active cad/chf. dementia a+o x1.

## 2020-04-23 NOTE — PROGRESS NOTE ADULT - SUBJECTIVE AND OBJECTIVE BOX
Ortho Progress Note    S: Patient seen and examined. No acute events overnight. Pain well controlled with current regimen. NPO for OR today      O:  Physical Exam:  Gen: Laying in bed, NAD, alert and oriented.   Resp: Unlabored breathing  Ext:   LUE:  Splint c/d/i  AIN/PIN/U intact  SILT M/R/U  WWP  LLE:  EHL/FHL/TA/Sol/T intact          + SILT DP/SP/MOULTON/Sa          +DP, extremity WWP    Vital Signs Last 24 Hrs  T(C): 36.6 (23 Apr 2020 00:47), Max: 37.1 (22 Apr 2020 18:39)  T(F): 97.9 (23 Apr 2020 00:47), Max: 98.7 (22 Apr 2020 18:39)  HR: 66 (23 Apr 2020 00:47) (60 - 67)  BP: 122/68 (23 Apr 2020 00:47) (113/61 - 157/78)  BP(mean): --  RR: 18 (23 Apr 2020 00:47) (17 - 18)  SpO2: 99% (23 Apr 2020 00:47) (99% - 100%)                          13.2   6.45  )-----------( 176      ( 22 Apr 2020 19:07 )             39.7       04-22    139  |  102  |  28<H>  ----------------------------<  116<H>  4.5   |  23  |  0.89        PT/INR - ( 22 Apr 2020 19:07 )   PT: 10.9 sec;   INR: 0.95 ratio         PTT - ( 22 Apr 2020 19:07 )  PTT:26.1 sec

## 2020-04-24 LAB
ANION GAP SERPL CALC-SCNC: 10 MMOL/L — SIGNIFICANT CHANGE UP (ref 5–17)
APTT BLD: 24.6 SEC — LOW (ref 27.5–36.3)
BUN SERPL-MCNC: 31 MG/DL — HIGH (ref 7–23)
CALCIUM SERPL-MCNC: 7.5 MG/DL — LOW (ref 8.4–10.5)
CHLORIDE SERPL-SCNC: 111 MMOL/L — HIGH (ref 96–108)
CO2 SERPL-SCNC: 20 MMOL/L — LOW (ref 22–31)
CREAT SERPL-MCNC: 0.83 MG/DL — SIGNIFICANT CHANGE UP (ref 0.5–1.3)
GLUCOSE SERPL-MCNC: 104 MG/DL — HIGH (ref 70–99)
HCT VFR BLD CALC: 22.1 % — LOW (ref 34.5–45)
HCT VFR BLD CALC: 22.2 % — LOW (ref 34.5–45)
HCT VFR BLD CALC: 22.7 % — LOW (ref 34.5–45)
HCT VFR BLD CALC: 25.2 % — LOW (ref 34.5–45)
HGB BLD-MCNC: 7.3 G/DL — LOW (ref 11.5–15.5)
HGB BLD-MCNC: 7.4 G/DL — LOW (ref 11.5–15.5)
HGB BLD-MCNC: 7.4 G/DL — LOW (ref 11.5–15.5)
HGB BLD-MCNC: 8.3 G/DL — LOW (ref 11.5–15.5)
INR BLD: 1.19 RATIO — HIGH (ref 0.88–1.16)
MAGNESIUM SERPL-MCNC: 1.7 MG/DL — SIGNIFICANT CHANGE UP (ref 1.6–2.6)
MCHC RBC-ENTMCNC: 31.6 PG — SIGNIFICANT CHANGE UP (ref 27–34)
MCHC RBC-ENTMCNC: 32 PG — SIGNIFICANT CHANGE UP (ref 27–34)
MCHC RBC-ENTMCNC: 32.2 GM/DL — SIGNIFICANT CHANGE UP (ref 32–36)
MCHC RBC-ENTMCNC: 32.2 PG — SIGNIFICANT CHANGE UP (ref 27–34)
MCHC RBC-ENTMCNC: 32.5 PG — SIGNIFICANT CHANGE UP (ref 27–34)
MCHC RBC-ENTMCNC: 32.9 GM/DL — SIGNIFICANT CHANGE UP (ref 32–36)
MCHC RBC-ENTMCNC: 33.3 GM/DL — SIGNIFICANT CHANGE UP (ref 32–36)
MCHC RBC-ENTMCNC: 33.5 GM/DL — SIGNIFICANT CHANGE UP (ref 32–36)
MCV RBC AUTO: 96.5 FL — SIGNIFICANT CHANGE UP (ref 80–100)
MCV RBC AUTO: 96.9 FL — SIGNIFICANT CHANGE UP (ref 80–100)
MCV RBC AUTO: 97.3 FL — SIGNIFICANT CHANGE UP (ref 80–100)
MCV RBC AUTO: 98.3 FL — SIGNIFICANT CHANGE UP (ref 80–100)
NRBC # BLD: 0 /100 WBCS — SIGNIFICANT CHANGE UP (ref 0–0)
PHOSPHATE SERPL-MCNC: 3.3 MG/DL — SIGNIFICANT CHANGE UP (ref 2.5–4.5)
PLATELET # BLD AUTO: 102 K/UL — LOW (ref 150–400)
PLATELET # BLD AUTO: 88 K/UL — LOW (ref 150–400)
PLATELET # BLD AUTO: 90 K/UL — LOW (ref 150–400)
PLATELET # BLD AUTO: 96 K/UL — LOW (ref 150–400)
POTASSIUM SERPL-MCNC: 3.8 MMOL/L — SIGNIFICANT CHANGE UP (ref 3.5–5.3)
POTASSIUM SERPL-SCNC: 3.8 MMOL/L — SIGNIFICANT CHANGE UP (ref 3.5–5.3)
PROTHROM AB SERPL-ACNC: 13.6 SEC — HIGH (ref 10–12.9)
RBC # BLD: 2.28 M/UL — LOW (ref 3.8–5.2)
RBC # BLD: 2.3 M/UL — LOW (ref 3.8–5.2)
RBC # BLD: 2.31 M/UL — LOW (ref 3.8–5.2)
RBC # BLD: 2.59 M/UL — LOW (ref 3.8–5.2)
RBC # FLD: 14.6 % — HIGH (ref 10.3–14.5)
RBC # FLD: 14.7 % — HIGH (ref 10.3–14.5)
SODIUM SERPL-SCNC: 141 MMOL/L — SIGNIFICANT CHANGE UP (ref 135–145)
WBC # BLD: 10.69 K/UL — HIGH (ref 3.8–10.5)
WBC # BLD: 10.84 K/UL — HIGH (ref 3.8–10.5)
WBC # BLD: 8.15 K/UL — SIGNIFICANT CHANGE UP (ref 3.8–10.5)
WBC # BLD: 9.83 K/UL — SIGNIFICANT CHANGE UP (ref 3.8–10.5)
WBC # FLD AUTO: 10.69 K/UL — HIGH (ref 3.8–10.5)
WBC # FLD AUTO: 10.84 K/UL — HIGH (ref 3.8–10.5)
WBC # FLD AUTO: 8.15 K/UL — SIGNIFICANT CHANGE UP (ref 3.8–10.5)
WBC # FLD AUTO: 9.83 K/UL — SIGNIFICANT CHANGE UP (ref 3.8–10.5)

## 2020-04-24 PROCEDURE — 71045 X-RAY EXAM CHEST 1 VIEW: CPT | Mod: 26

## 2020-04-24 RX ORDER — OXYCODONE HYDROCHLORIDE 5 MG/1
5 TABLET ORAL ONCE
Refills: 0 | Status: DISCONTINUED | OUTPATIENT
Start: 2020-04-24 | End: 2020-04-24

## 2020-04-24 RX ORDER — POLYETHYLENE GLYCOL 3350 17 G/17G
17 POWDER, FOR SOLUTION ORAL DAILY
Refills: 0 | Status: DISCONTINUED | OUTPATIENT
Start: 2020-04-24 | End: 2020-04-27

## 2020-04-24 RX ORDER — MAGNESIUM SULFATE 500 MG/ML
2 VIAL (ML) INJECTION ONCE
Refills: 0 | Status: COMPLETED | OUTPATIENT
Start: 2020-04-24 | End: 2020-04-24

## 2020-04-24 RX ORDER — HALOPERIDOL DECANOATE 100 MG/ML
2.5 INJECTION INTRAMUSCULAR ONCE
Refills: 0 | Status: COMPLETED | OUTPATIENT
Start: 2020-04-24 | End: 2020-04-24

## 2020-04-24 RX ORDER — ENOXAPARIN SODIUM 100 MG/ML
40 INJECTION SUBCUTANEOUS DAILY
Refills: 0 | Status: DISCONTINUED | OUTPATIENT
Start: 2020-04-25 | End: 2020-04-27

## 2020-04-24 RX ADMIN — ATORVASTATIN CALCIUM 20 MILLIGRAM(S): 80 TABLET, FILM COATED ORAL at 21:26

## 2020-04-24 RX ADMIN — SENNA PLUS 2 TABLET(S): 8.6 TABLET ORAL at 21:26

## 2020-04-24 RX ADMIN — OXYCODONE HYDROCHLORIDE 5 MILLIGRAM(S): 5 TABLET ORAL at 00:32

## 2020-04-24 RX ADMIN — HALOPERIDOL DECANOATE 2.5 MILLIGRAM(S): 100 INJECTION INTRAMUSCULAR at 01:18

## 2020-04-24 RX ADMIN — Medication 50 GRAM(S): at 04:35

## 2020-04-24 NOTE — DIETITIAN INITIAL EVALUATION ADULT. - FACTORS AFF FOOD INTAKE
per RN, pt wears dentures, pt reports they fit well, currently does not have them in place, as per RN, pt was still able to eat breakfast very well this morning; pt denies any chewing/swallowing issues

## 2020-04-24 NOTE — DIETITIAN INITIAL EVALUATION ADULT. - OTHER INFO
Pt able to answer questions appropriately at this time, seemed forgetful.     Pt reports good appetite and intake at home. Reports she was even making hot dinner for herself. Reports she was eating 3 meals daily, variety of meals per diet recall.     Pt reports NKFA. No micronutrient coverage as per H&P.    Pt reports she used to weigh 135 pounds but cannot recall when, states she is 83 years old, pt is 87 years old at this time. Noted admit wt of 123.4 pounds and most recent wt of 117.5 pounds (change in 2 days, highly unlikely), pt reports good intake at home, no family at bedside at this time as visiting limitations due to COVID-19 pandemic. Visually pt looks to be overall well nourished, elderly. Unable to conduct nutrition focused physical exam at this time due to limited contact due to current novel coronavirus pandemic.

## 2020-04-24 NOTE — PHYSICAL THERAPY INITIAL EVALUATION ADULT - IMPAIRMENTS CONTRIBUTING IMPAIRED BED MOBILITY, REHAB EVAL
decreased strength/impaired postural control/impaired balance/narrow base of support/pain/decreased flexibility

## 2020-04-24 NOTE — PROGRESS NOTE ADULT - ASSESSMENT
84 yo female with hx of HTN, HLD, dementia, lung CA coming in with left hip and wrist pain after a mechanical trip and fall this afternoon.  Pt was with her daughter in the living room, spun and lost her balance falling to her left side.  Did not hit head no LOC.  No neck or back pain.  No numbness or weakness.  Pain in the left hip with deformity.  Pain is worse with movement, better with immobilization.  Similar complaints for left wrist pain.  No other complaints at this time    Tolerated surgery well  Vigorous incentive spirometry and fluids  and PRBC transfusion  as needed.  Yesterday patient was hypotensive after OR. Pressors were eventually weaned but patient did have episode of vasovagal in PACU. Combination of hypotension with possible AMS led to SICU consult and ultimate SICU monitoring overnight. Patient received 1 unit of PRBC and appropriately responded.     Now No sob or chest pain   NO fever or chills  stressed incentive spirometry.

## 2020-04-24 NOTE — PHYSICAL THERAPY INITIAL EVALUATION ADULT - GENERAL OBSERVATIONS, REHAB EVAL
Pt received semisupine in bed with +IV, +cardiac monitor, +pulse ox, +BP cuff, +castro and +B compressions. NAD noted.

## 2020-04-24 NOTE — PROGRESS NOTE ADULT - PROBLEM SELECTOR PROBLEM 4
Spoke with patient's mom and stated there were no MRI's in patient's chart. She said that patient saw Dr. Bernal. I told her I could not locate an office visit in the chart. She was asking for a copy of the MRI report as she was looking for evidence for \"short wire in the brain.\"   She requested the only 2 images in chart be mailed to them as they need it for his file.   CT of brain and Esophagram mailed to patient's home.    Hypertension

## 2020-04-24 NOTE — PHYSICAL THERAPY INITIAL EVALUATION ADULT - PERTINENT HX OF CURRENT PROBLEM, REHAB EVAL
Pt is a 87 y.o. female with hx of HTN, HLD, dementia, lung CA coming in with left hip and wrist pain after a mechanical trip and fall this afternoon.  Pt was with her daughter in the living room, spun and lost her balance falling to her left side. Pain in the left hip with deformity. Pain is worse with movement, better with immobilization. Similar complaints for left wrist pain. (-) CXR 4/22/2020. Continued below.

## 2020-04-24 NOTE — PHYSICAL THERAPY INITIAL EVALUATION ADULT - ADDITIONAL COMMENTS
As per conversation with REYES Arias who spoke to pt's son, pt lives with her  in a house with 9 steps. Pt was independent with functional mobility but required assistance for ADLs. Pt did not use a AD for ambulation PTA.

## 2020-04-24 NOTE — PROGRESS NOTE ADULT - SUBJECTIVE AND OBJECTIVE BOX
SICU Daily Progress Note  =====================================================  Interval/Overnight Events:      - HDS off pressors on arrival to SICU  - Sundowning overnight, persistently agitated, improved with 2.5 haldol  - Pain controlled  - H/H overresponded to 1U PRBC, surgical site hematoma unchanged    HPI:   ALEXANDRA ZUNIGA is a 87 yr old Female presented 4/22 c/o L hip pain and L wrist pain s/p mechanical fall. Patient was dancing in her living room when she fell. Patient denies head hit or LOC. Pt found to have acute Lt intertrochanteric femur fracture. On 4/23 pt underwent Lt. hip IMN. Postoperatively pt had a vasovagal episode while using the bathroom in pacu, became unresponsive briefly after which she stabilized. At this time pt was also requiring phenylephrine for hypotension and was noted to have a drop in H/H on repeat labs. Pt given 1U PRBC and SICU consulted for monitoring for hemorrhagic shock.      Allergies: No Known Allergies          MEDICATIONS:   --------------------------------------------------------------------------------------  Neurologic Medications  acetaminophen   Tablet .. 975 milliGRAM(s) Oral every 8 hours PRN Mild Pain (1 - 3)  melatonin 3 milliGRAM(s) Oral at bedtime PRN Insomnia    Respiratory Medications    Cardiovascular Medications    Gastrointestinal Medications  magnesium sulfate  IVPB 2 Gram(s) IV Intermittent once  senna 2 Tablet(s) Oral at bedtime  sodium chloride 0.9%. 1000 milliLiter(s) IV Continuous <Continuous>    Genitourinary Medications    Hematologic/Oncologic Medications    Antimicrobial/Immunologic Medications    Endocrine/Metabolic Medications  atorvastatin 20 milliGRAM(s) Oral at bedtime    Topical/Other Medications    --------------------------------------------------------------------------------------    VITAL SIGNS, INS/OUTS (last 24 hours):  --------------------------------------------------------------------------------------  T(C): 36.9 (04-23-20 @ 23:00), Max: 37.3 (04-23-20 @ 13:00)  HR: 74 (04-24-20 @ 01:00) (44 - 95)  BP: 99/49 (04-24-20 @ 01:00) (58/38 - 158/68)  BP(mean): 69 (04-24-20 @ 01:00) (41 - 103)  ABP: --  ABP(mean): --  RR: 29 (04-24-20 @ 01:00) (12 - 30)  SpO2: 100% (04-24-20 @ 01:00) (98% - 100%)  Wt(kg): --  CVP(mm Hg): --  CI: --  CAPILLARY BLOOD GLUCOSE      POCT Blood Glucose.: 161 mg/dL (23 Apr 2020 15:54)   N/A      04-22 @ 07:01  -  04-23 @ 07:00  --------------------------------------------------------  IN:    sodium chloride 0.9%: 875 mL  Total IN: 875 mL    OUT:  Total OUT: 0 mL    Total NET: 875 mL      04-23 @ 07:01  -  04-24 @ 01:43  --------------------------------------------------------  IN:    IV PiggyBack: 150 mL    Oral Fluid: 390 mL    Packed Red Blood Cells: 294 mL    phenylephrine   Infusion: 15 mL    phenylephrine   Infusion: 45 mL    Sodium Chloride 0.9% IV Bolus: 100 mL    sodium chloride 0.9%.: 1800 mL  Total IN: 2794 mL    OUT:    Indwelling Catheter - Urethral: 530 mL    Voided: 200 mL  Total OUT: 730 mL    Total NET: 2064 mL        --------------------------------------------------------------------------------------    EXAM  NEUROLOGY  RASS: 1  	GCS:  14  Exam: Awake, confused, difficult to redirect    RESPIRATORY  Exam: Nonlabored, CTABL, no wheezes, ronchi, or rales.       CARDIOVASCULAR  Exam: Normotensive, RRR, no M/R/G     GI/NUTRITION  Exam: Abdomen soft, NT/ND, no rebound or guarding.  Current Diet: Regular    VASCULAR  Exam: Extremities warm, well-perfused. Adequate capillary refill. L thigh hematoma firm, unchanged from prior exam, appropriate mirela-incisional TTP    HEMATOLOGIC  [x] VTE Prophylaxis: SCDs      INFECTIOUS DISEASE  Antimicrobials/Immunologic Medications:      Tubes/Lines/Drains  ***  [x] Peripheral IV  [] Central Venous Line     	[] R	[] L	[] IJ	[] Fem	[] SC	Date Placed:   [] Arterial Line		[] R	[] L	[] Fem	[] Rad	[] Ax	Date Placed:   [] PICC		[] Midline		[] Mediport  [X] Urinary Catheter		  [x] Necessity of urinary, arterial, and venous catheters discussed    LABS  --------------------------------------------------------------------------------------  CBC (04-23 @ 22:23)                              9.5<L>                         9.66    )----------------(  101<L>     --    % Neutrophils, --    % Lymphocytes, ANC: --                                  28.5<L>              CBC (04-23 @ 16:20)                              7.1<L>                         12.19<H>  )----------------(  116<L>     --    % Neutrophils, --    % Lymphocytes, ANC: --                                  22.1<L>                BMP (04-23 @ 22:23)             139     |  107     |  31<H> 		Ca++ --      Ca 8.7                ---------------------------------( 145<H>		Mg 1.9                4.3     |  21<L>   |  0.71  			Ph 3.5     BMP (04-23 @ 10:41)             139     |  107     |  26<H> 		Ca++ --      Ca 8.7                ---------------------------------( 142<H>		Mg --                 4.7     |  19<L>   |  0.68  			Ph --        LFTs (04-22 @ 19:07)      TPro 6.5 / Alb 4.0 / TBili 0.4 / DBili -- / AST 18 / ALT 20 / AlkPhos 109    Coags (04-23 @ 22:23)  aPTT 21.1<L> / INR 1.09 / PT 12.5  Coags (04-23 @ 05:41)  aPTT 27.0<L> / INR 1.03 / PT 11.8    ABG (04-23 @ 16:15)     7.41 / 33 / 190<H> / 20<L> / -3.2<L> / 99<H>%     Lactate:           --------------------------------------------------------------------------------------

## 2020-04-24 NOTE — DIETITIAN INITIAL EVALUATION ADULT. - CONTINUE CURRENT NUTRITION CARE PLAN
regular diet; depending on intake and tolerance of regular texture given pt doesn't have dentures, may consider soft diet as needed/yes

## 2020-04-24 NOTE — PHYSICAL THERAPY INITIAL EVALUATION ADULT - PRECAUTIONS/LIMITATIONS, REHAB EVAL
fall precautions/+L wrist x-ray 4/23/2020: Generalized bone demineralization. Comminuted intra-articular left distal radius fracture. Ulnar styloid fracture. +L forearm x-ray 4/22/2020: Redemonstrated acute comminuted fracture of the distal radius with intra-articular extension and redemonstrated minimally displaced ulnar styloid fracture. +L femur x-ray 4/22/2020: There is an acute, comminuted, intertrochanteric fracture of the left femur with varus angulation of the fracture fragments.

## 2020-04-24 NOTE — PROGRESS NOTE ADULT - PROBLEM SELECTOR PLAN 4
BP under control,  Episode of  hypotension was aggressively treated and is now back to normal off pressors

## 2020-04-24 NOTE — DIETITIAN INITIAL EVALUATION ADULT. - PHYSICAL APPEARANCE
other (specify) Skin: no pressure injuries   Edema: +3 left leg    ht: 5'4", wt: 123.4 pounds, BMI:21.2 kg/m2, IBW: 120 pounds +/- 10%

## 2020-04-24 NOTE — PROGRESS NOTE ADULT - SUBJECTIVE AND OBJECTIVE BOX
Patient is a 87y old  Female who presents with a chief complaint of hip pain /injury  	      HPI:  86 yo female with hx of HTN, HLD, dementia, lung CA coming in with left hip and wrist pain after a mechanical trip and fall this afternoon.  Pt was with her daughter in the living room, spun and lost her balance falling to her left side.  Did not hit head no LOC.  No neck or back pain.  No numbness or weakness.  Pain in the left hip with deformity.  Pain is worse with movement, better with immobilization.  Similar complaints for left wrist pain.  No other complaints at this time.  Hip fracture 23-Apr-2020 07:34:46  Juve Cabello  ·  POST-OP DIAGNOSIS:  Hip fracture 23-Apr-2020 07:35:01  Juve Cabello  ·  PROCEDURES:  ORIF, hip, with intramedullary deidra 23-Apr-2020 07:34:35  Juve Cabello   and PRBC transfusion as needed   MEDICATIONS  (STANDING):  atorvastatin 20 milliGRAM(s) Oral at bedtime  senna 2 Tablet(s) Oral at bedtime  sodium chloride 0.9%. 1000 milliLiter(s) (50 mL/Hr) IV Continuous <Continuous>    MEDICATIONS  (PRN):  acetaminophen   Tablet .. 975 milliGRAM(s) Oral every 8 hours PRN Mild Pain (1 - 3)  melatonin 3 milliGRAM(s) Oral at bedtime PRN Insomnia        ICU Vital Signs Last 24 Hrs  T(C): 37 (24 Apr 2020 07:00), Max: 37.3 (23 Apr 2020 13:00)  T(F): 98.6 (24 Apr 2020 07:00), Max: 99.1 (23 Apr 2020 13:00)  HR: 68 (24 Apr 2020 08:00) (52 - 95)  BP: 108/53 (24 Apr 2020 08:00) (58/38 - 153/48)  BP(mean): 76 (24 Apr 2020 08:00) (38 - 101)  ABP: --  ABP(mean): --  RR: 16 (24 Apr 2020 08:00) (12 - 30)  SpO2: 99% (24 Apr 2020 08:00) (98% - 100%)        PHYSICAL EXAM:  GENERAL: NAD, well nourished and conversant  HEAD:  Atraumatic  EYES: EOM, PERRLA, conjunctiva pink and sclera white  ENT: No tonsillar erythema, exudates, or enlargement, moist mucous membranes, good dentition, no lesions  NECK: Supple, No JVD, normal thyroid, carotids with normal upstrokes and no bruits  CHEST/LUNG: Clear to auscultation bilaterally, No rales, rhonchi, wheezing, or rubs  HEART: Regular rate and rhythm, No murmurs, rubs, or gallops  ABDOMEN: Soft, nondistended, no masses, guarding, tenderness or rebound, bowel sounds present  EXTREMITIES:  2+ Peripheral Pulses, No clubbing, cyanosis, or edema.  Left hip fracture(+) IM deidra  LYMPH: No lymphadenopathy noted  SKIN: No rashes or lesions  NERVOUS SYSTEM:  Alert & Oriented X3, normal cognitive function. Motor Strength 5/5 right upper and right lower.  5/5 left upper and left lower extremities, DTRs 2+ intact and symmetric    ABG - ( 23 Apr 2020 16:15 )  pH, Arterial: 7.41  pH, Blood: x     /  pCO2: 33    /  pO2: 190   / HCO3: 20    / Base Excess: -3.2  /  SaO2: 99                    CBC Full  -  ( 24 Apr 2020 07:49 )  WBC Count : 9.83 K/uL  RBC Count : 2.59 M/uL  Hemoglobin : 8.3 g/dL  Hematocrit : 25.2 %  Platelet Count - Automated : 102 K/uL  Mean Cell Volume : 97.3 fl  Mean Cell Hemoglobin : 32.0 pg  Mean Cell Hemoglobin Concentration : 32.9 gm/dL  Auto Neutrophil # : x  Auto Lymphocyte # : x  Auto Monocyte # : x  Auto Eosinophil # : x  Auto Basophil # : x  Auto Neutrophil % : x  Auto Lymphocyte % : x  Auto Monocyte % : x  Auto Eosinophil % : x  Auto Basophil % : x    04-24    141  |  111<H>  |  31<H>  ----------------------------<  104<H>  3.8   |  20<L>  |  0.83    Ca    7.5<L>      24 Apr 2020 05:41  Phos  3.3     04-24  Mg     1.7     04-24    TPro  6.5  /  Alb  4.0  /  TBili  0.4  /  DBili  x   /  AST  18  /  ALT  20  /  AlkPhos  109  04-22    LIVER FUNCTIONS - ( 22 Apr 2020 19:07 )  Alb: 4.0 g/dL / Pro: 6.5 g/dL / ALK PHOS: 109 U/L / ALT: 20 U/L / AST: 18 U/L / GGT: x           PT/INR - ( 24 Apr 2020 05:41 )   PT: 13.6 sec;   INR: 1.19 ratio         PTT - ( 24 Apr 2020 05:41 )  PTT:24.6 sec      CBC Full  -  ( 23 Apr 2020 05:41 )  WBC Count : 8.76 K/uL  RBC Count : 3.25 M/uL  Hemoglobin : 10.7 g/dL  Hematocrit : 32.2 %  Platelet Count - Automated : 140 K/uL  Mean Cell Volume : 99.1 fl  Mean Cell Hemoglobin : 32.9 pg  Mean Cell Hemoglobin Concentration : 33.2 gm/dL  Auto Neutrophil # : x  Auto Lymphocyte # : x  Auto Monocyte # : x  Auto Eosinophil # : x  Auto Basophil # : x  Auto Neutrophil % : x  Auto Lymphocyte % : x  Auto Monocyte % : x  Auto Eosinophil % : x  Auto Basophil % : x    04-23    140  |  104  |  28<H>  ----------------------------<  115<H>  4.5   |  21<L>  |  0.71    Ca    9.3      23 Apr 2020 05:41    TPro  6.5  /  Alb  4.0  /  TBili  0.4  /  DBili  x   /  AST  18  /  ALT  20  /  AlkPhos  109  04-22    LIVER FUNCTIONS - ( 22 Apr 2020 19:07 )  Alb: 4.0 g/dL / Pro: 6.5 g/dL / ALK PHOS: 109 U/L / ALT: 20 U/L / AST: 18 U/L / GGT: x           PT/INR - ( 23 Apr 2020 05:41 )   PT: 11.8 sec;   INR: 1.03 ratio         PTT - ( 23 Apr 2020 05:41 )  PTT:27.0 sec        CBC Full  -  ( 22 Apr 2020 19:07 )  WBC Count : 6.45 K/uL  RBC Count : 4.01 M/uL  Hemoglobin : 13.2 g/dL  Hematocrit : 39.7 %  Platelet Count - Automated : 176 K/uL  Mean Cell Volume : 99.0 fl  Mean Cell Hemoglobin : 32.9 pg  Mean Cell Hemoglobin Concentration : 33.2 gm/dL  Auto Neutrophil # : 4.62 K/uL  Auto Lymphocyte # : 1.33 K/uL  Auto Monocyte # : 0.42 K/uL  Auto Eosinophil # : 0.03 K/uL  Auto Basophil # : 0.02 K/uL  Auto Neutrophil % : 71.6 %  Auto Lymphocyte % : 20.6 %  Auto Monocyte % : 6.5 %  Auto Eosinophil % : 0.5 %  Auto Basophil % : 0.3 %    04-22    139  |  102  |  28<H>  ----------------------------<  116<H>  4.5   |  23  |  0.89    Ca    9.8      22 Apr 2020 19:07    TPro  6.5  /  Alb  4.0  /  TBili  0.4  /  DBili  x   /  AST  18  /  ALT  20  /  AlkPhos  109  04-22    LIVER FUNCTIONS - ( 22 Apr 2020 19:07 )  Alb: 4.0 g/dL / Pro: 6.5 g/dL / ALK PHOS: 109 U/L / ALT: 20 U/L / AST: 18 U/L / GGT: x           PT/INR - ( 22 Apr 2020 19:07 )   PT: 10.9 sec;   INR: 0.95 ratio         PTT - ( 22 Apr 2020 19:07 )  PTT:26.1 sec    CAPILLARY BLOOD GLUCOSE          RADIOLOGY & ADDITIONAL TESTS:    Consultant(s):    Care Discussed with Consultants/Other Providers [ ] YES  [ ] NO

## 2020-04-24 NOTE — DIETITIAN INITIAL EVALUATION ADULT. - REASON INDICATOR FOR ASSESSMENT
Pt seen for LOS in ICU.   Source: pt and RN, pt c dementia, confused at times     Pt admitted S/P fall with hip and wrist pain, noted pt S/P left hip IMN; had vasovagal episode after surgery and low H/H and RRT for mental status changes, admitted to SICU for monitoring and hemorrhagic shock. Noted transient need for pressors and sundowning at night.

## 2020-04-24 NOTE — DIETITIAN INITIAL EVALUATION ADULT. - ADD RECOMMEND
Encouraged continued good PO intake, pt agreeable to health shakes x 2 daily in addition to meals to ensure adequate intake-provided preemptively given pt elderly and with dementia. Discussed protein rich foods available on menu. Discussed consuming protein first at meal times and then eating the other foods.

## 2020-04-24 NOTE — PROGRESS NOTE ADULT - SUBJECTIVE AND OBJECTIVE BOX
Orthopaedic Surgery Progress Note    Subjective:   Patient seen and examined  Yesterday patient was hypotensive after OR. Pressors were eventually weaned but patient did have episode of vasovagal in PACU. Combination of hypotension with possible AMS led to SICU consult and ultimate SICU monitoring overnight. Patient received 1 unit of PRBC and appropriately responded.      Objective:  T(C): 37.1 (04-24-20 @ 03:00), Max: 37.3 (04-23-20 @ 13:00)  HR: 65 (04-24-20 @ 03:00) (44 - 95)  BP: 90/49 (04-24-20 @ 03:00) (58/38 - 158/68)  RR: 13 (04-24-20 @ 03:00) (12 - 30)  SpO2: 99% (04-24-20 @ 03:00) (98% - 100%)  Wt(kg): --    04-22 @ 07:01  -  04-23 @ 07:00  --------------------------------------------------------  IN: 875 mL / OUT: 0 mL / NET: 875 mL    04-23 @ 07:01  -  04-24 @ 05:12  --------------------------------------------------------  IN: 2994 mL / OUT: 905 mL / NET: 2089 mL    PE    NAD  LLE:   dressing C/D/I  motor intact GS/TA/EHL  SILT S/S/SP/DP  WWP    LUE:  SAC, clean/dry/intact  motor AIN/PIN/U intact  SILT M/U/R  wwp                          9.5    9.66  )-----------( 101      ( 23 Apr 2020 22:23 )             28.5     04-23    139  |  107  |  31<H>  ----------------------------<  145<H>  4.3   |  21<L>  |  0.71    Ca    8.7      23 Apr 2020 22:23  Phos  3.5     04-23  Mg     1.9     04-23    TPro  6.5  /  Alb  4.0  /  TBili  0.4  /  DBili  x   /  AST  18  /  ALT  20  /  AlkPhos  109  04-22    PT/INR - ( 23 Apr 2020 22:23 )   PT: 12.5 sec;   INR: 1.09 ratio         PTT - ( 23 Apr 2020 22:23 )  PTT:21.1 sec    87y Female s/p Left femur IMN POD1 and SAC for L distal radius fracture  - monitor pressire/vitals  - DC castro  - WBAT LLE   - NWB LUE in SAC  - PT/OT/OOB  - DVT ppx - Lovenox  - appreciate SICU care

## 2020-04-24 NOTE — PHYSICAL THERAPY INITIAL EVALUATION ADULT - PLANNED THERAPY INTERVENTIONS, PT EVAL
balance training/gait training/strengthening/bed mobility training/transfer training/GOAL: Stair Negotiation Training: Patient will be able to negotiate up & down 1 flight of stairs with unilateral rail, step to gait pattern, in 4 weeks.

## 2020-04-25 LAB
ANION GAP SERPL CALC-SCNC: 11 MMOL/L — SIGNIFICANT CHANGE UP (ref 5–17)
BUN SERPL-MCNC: 27 MG/DL — HIGH (ref 7–23)
CALCIUM SERPL-MCNC: 8.4 MG/DL — SIGNIFICANT CHANGE UP (ref 8.4–10.5)
CHLORIDE SERPL-SCNC: 104 MMOL/L — SIGNIFICANT CHANGE UP (ref 96–108)
CO2 SERPL-SCNC: 22 MMOL/L — SIGNIFICANT CHANGE UP (ref 22–31)
CREAT SERPL-MCNC: 0.6 MG/DL — SIGNIFICANT CHANGE UP (ref 0.5–1.3)
GLUCOSE SERPL-MCNC: 99 MG/DL — SIGNIFICANT CHANGE UP (ref 70–99)
HCT VFR BLD CALC: 25 % — LOW (ref 34.5–45)
HCT VFR BLD CALC: 26.3 % — LOW (ref 34.5–45)
HGB BLD-MCNC: 8.4 G/DL — LOW (ref 11.5–15.5)
HGB BLD-MCNC: 8.9 G/DL — LOW (ref 11.5–15.5)
MCHC RBC-ENTMCNC: 31.3 PG — SIGNIFICANT CHANGE UP (ref 27–34)
MCHC RBC-ENTMCNC: 32 PG — SIGNIFICANT CHANGE UP (ref 27–34)
MCHC RBC-ENTMCNC: 33.6 GM/DL — SIGNIFICANT CHANGE UP (ref 32–36)
MCHC RBC-ENTMCNC: 33.8 GM/DL — SIGNIFICANT CHANGE UP (ref 32–36)
MCV RBC AUTO: 93.3 FL — SIGNIFICANT CHANGE UP (ref 80–100)
MCV RBC AUTO: 94.6 FL — SIGNIFICANT CHANGE UP (ref 80–100)
NRBC # BLD: 0 /100 WBCS — SIGNIFICANT CHANGE UP (ref 0–0)
NRBC # BLD: 0 /100 WBCS — SIGNIFICANT CHANGE UP (ref 0–0)
PLATELET # BLD AUTO: 120 K/UL — LOW (ref 150–400)
PLATELET # BLD AUTO: 97 K/UL — LOW (ref 150–400)
POTASSIUM SERPL-MCNC: 3.7 MMOL/L — SIGNIFICANT CHANGE UP (ref 3.5–5.3)
POTASSIUM SERPL-SCNC: 3.7 MMOL/L — SIGNIFICANT CHANGE UP (ref 3.5–5.3)
RBC # BLD: 2.68 M/UL — LOW (ref 3.8–5.2)
RBC # BLD: 2.78 M/UL — LOW (ref 3.8–5.2)
RBC # FLD: 15.8 % — HIGH (ref 10.3–14.5)
RBC # FLD: 15.9 % — HIGH (ref 10.3–14.5)
SODIUM SERPL-SCNC: 137 MMOL/L — SIGNIFICANT CHANGE UP (ref 135–145)
WBC # BLD: 10.42 K/UL — SIGNIFICANT CHANGE UP (ref 3.8–10.5)
WBC # BLD: 9.7 K/UL — SIGNIFICANT CHANGE UP (ref 3.8–10.5)
WBC # FLD AUTO: 10.42 K/UL — SIGNIFICANT CHANGE UP (ref 3.8–10.5)
WBC # FLD AUTO: 9.7 K/UL — SIGNIFICANT CHANGE UP (ref 3.8–10.5)

## 2020-04-25 RX ORDER — ASPIRIN/CALCIUM CARB/MAGNESIUM 324 MG
81 TABLET ORAL DAILY
Refills: 0 | Status: DISCONTINUED | OUTPATIENT
Start: 2020-04-25 | End: 2020-04-27

## 2020-04-25 RX ADMIN — ATORVASTATIN CALCIUM 20 MILLIGRAM(S): 80 TABLET, FILM COATED ORAL at 22:49

## 2020-04-25 RX ADMIN — SENNA PLUS 2 TABLET(S): 8.6 TABLET ORAL at 22:49

## 2020-04-25 RX ADMIN — Medication 3 MILLIGRAM(S): at 22:50

## 2020-04-25 RX ADMIN — Medication 81 MILLIGRAM(S): at 14:01

## 2020-04-25 RX ADMIN — POLYETHYLENE GLYCOL 3350 17 GRAM(S): 17 POWDER, FOR SOLUTION ORAL at 14:01

## 2020-04-25 RX ADMIN — ENOXAPARIN SODIUM 40 MILLIGRAM(S): 100 INJECTION SUBCUTANEOUS at 14:02

## 2020-04-25 NOTE — PROGRESS NOTE ADULT - SUBJECTIVE AND OBJECTIVE BOX
Orthopaedic Surgery Progress Note    Subjective:   Patient seen and examined  Discharged from SICU  1U yesterday  Feels well this AM    Vital Signs Last 24 Hrs  T(C): 36.6 (25 Apr 2020 05:30), Max: 37.4 (24 Apr 2020 20:38)  T(F): 97.9 (25 Apr 2020 05:30), Max: 99.3 (24 Apr 2020 20:38)  HR: 77 (25 Apr 2020 05:30) (67 - 97)  BP: 163/67 (25 Apr 2020 05:30) (100/48 - 163/67)  BP(mean): 75 (24 Apr 2020 16:59) (38 - 80)  RR: 18 (25 Apr 2020 05:30) (12 - 30)  SpO2: 95% (25 Apr 2020 05:30) (95% - 100%)      PE    NAD  LLE:   dressing C/D/I  motor intact GS/TA/EHL  SILT S/S/SP/DP/T  WWP    LUE:  SAC, clean/dry/intact  motor AIN/PIN/U intact  SILT M/U/R  wwp                                      8.4    9.70  )-----------( 97       ( 25 Apr 2020 06:13 )             25.0   04-24    141  |  111<H>  |  31<H>  ----------------------------<  104<H>  3.8   |  20<L>  |  0.83    Ca    7.5<L>      24 Apr 2020 05:41  Phos  3.3     04-24  Mg     1.7     04-24        87y Female s/p Left femur IMN POD1 and SAC for L distal radius fracture  - monitor pressire/vitals  - WBAT LLE   - NWB LUE in SAC  - PT/OT/OOB  - DVT ppx - Lovenox  - Dispo: Rehab

## 2020-04-25 NOTE — PROVIDER CONTACT NOTE (OTHER) - BACKGROUND
86 yo female with s/p fall risk maintained, bed alarm, hourly rounding., risk maintained, bed alarm, hourly rounding.. s/p IMN nailing left hip, left wrist fx with cast

## 2020-04-25 NOTE — PROVIDER CONTACT NOTE (OTHER) - ASSESSMENT
Pt did not void since last straight catherization at approx 0045 on 4/25/20. Pt without distention, without abd tenderness.

## 2020-04-25 NOTE — PROGRESS NOTE ADULT - SUBJECTIVE AND OBJECTIVE BOX
Patient is a 87y old  Female who presents with a chief complaint of hip pain /injury  	      HPI:  86 yo female with hx of HTN, HLD, dementia, lung CA coming in with left hip and wrist pain after a mechanical trip and fall this afternoon.  Pt was with her daughter in the living room, spun and lost her balance falling to her left side.  Did not hit head no LOC.  No neck or back pain.  No numbness or weakness.  Pain in the left hip with deformity.  Pain is worse with movement, better with immobilization.  Similar complaints for left wrist pain.  No other complaints at this time.  Hip fracture 23-Apr-2020 07:34:46  Juve Cabello  ·  POST-OP DIAGNOSIS:  Hip fracture 23-Apr-2020 07:35:01  Juve Cabello  ·  PROCEDURES:  ORIF, hip, with intramedullary deidra 23-Apr-2020 07:34:35  Juve Cabello   and PRBC transfusion as needed   MEDICATIONS  (STANDING):  atorvastatin 20 milliGRAM(s) Oral at bedtime  senna 2 Tablet(s) Oral at bedtime  sodium chloride 0.9%. 1000 milliLiter(s) (50 mL/Hr) IV Continuous <Continuous>    MEDICATIONS  (PRN):  acetaminophen   Tablet .. 975 milliGRAM(s) Oral every 8 hours PRN Mild Pain (1 - 3)  melatonin 3 milliGRAM(s) Oral at bedtime PRN Insomnia          Vital Signs Last 24 Hrs  T(C): 36.6 (25 Apr 2020 05:30), Max: 37.4 (24 Apr 2020 20:38)  T(F): 97.9 (25 Apr 2020 05:30), Max: 99.3 (24 Apr 2020 20:38)  HR: 77 (25 Apr 2020 05:30) (68 - 97)  BP: 163/67 (25 Apr 2020 05:30) (100/48 - 163/67)  BP(mean): 75 (24 Apr 2020 16:59) (69 - 80)  RR: 18 (25 Apr 2020 05:30) (12 - 30)  SpO2: 95% (25 Apr 2020 05:30) (95% - 100%)        PHYSICAL EXAM:  GENERAL: NAD, well nourished and conversant  HEAD:  Atraumatic  EYES: EOM, PERRLA, conjunctiva pink and sclera white  ENT: No tonsillar erythema, exudates, or enlargement, moist mucous membranes, good dentition, no lesions  NECK: Supple, No JVD, normal thyroid, carotids with normal upstrokes and no bruits  CHEST/LUNG: Clear to auscultation bilaterally, No rales, rhonchi, wheezing, or rubs  HEART: Regular rate and rhythm, No murmurs, rubs, or gallops  ABDOMEN: Soft, nondistended, no masses, guarding, tenderness or rebound, bowel sounds present  EXTREMITIES:  2+ Peripheral Pulses, No clubbing, cyanosis, or edema.  Left hip fracture(+) IM deidra  LYMPH: No lymphadenopathy noted  SKIN: No rashes or lesions  NERVOUS SYSTEM:  Alert & Oriented X3, normal cognitive function. Motor Strength 5/5 right upper and right lower.  5/5 left upper and left lower extremities, DTRs 2+ intact and symmetric    no labs 4/25    CBC Full  -  ( 24 Apr 2020 07:49 )  WBC Count : 9.83 K/uL  RBC Count : 2.59 M/uL  Hemoglobin : 8.3 g/dL  Hematocrit : 25.2 %  Platelet Count - Automated : 102 K/uL  Mean Cell Volume : 97.3 fl  Mean Cell Hemoglobin : 32.0 pg  Mean Cell Hemoglobin Concentration : 32.9 gm/dL  Auto Neutrophil # : x  Auto Lymphocyte # : x  Auto Monocyte # : x  Auto Eosinophil # : x  Auto Basophil # : x  Auto Neutrophil % : x  Auto Lymphocyte % : x  Auto Monocyte % : x  Auto Eosinophil % : x  Auto Basophil % : x    04-24    141  |  111<H>  |  31<H>  ----------------------------<  104<H>  3.8   |  20<L>  |  0.83    Ca    7.5<L>      24 Apr 2020 05:41  Phos  3.3     04-24  Mg     1.7     04-24    TPro  6.5  /  Alb  4.0  /  TBili  0.4  /  DBili  x   /  AST  18  /  ALT  20  /  AlkPhos  109  04-22    LIVER FUNCTIONS - ( 22 Apr 2020 19:07 )  Alb: 4.0 g/dL / Pro: 6.5 g/dL / ALK PHOS: 109 U/L / ALT: 20 U/L / AST: 18 U/L / GGT: x           PT/INR - ( 24 Apr 2020 05:41 )   PT: 13.6 sec;   INR: 1.19 ratio         PTT - ( 24 Apr 2020 05:41 )  PTT:24.6 sec      CBC Full  -  ( 23 Apr 2020 05:41 )  WBC Count : 8.76 K/uL  RBC Count : 3.25 M/uL  Hemoglobin : 10.7 g/dL  Hematocrit : 32.2 %  Platelet Count - Automated : 140 K/uL  Mean Cell Volume : 99.1 fl  Mean Cell Hemoglobin : 32.9 pg  Mean Cell Hemoglobin Concentration : 33.2 gm/dL  Auto Neutrophil # : x  Auto Lymphocyte # : x  Auto Monocyte # : x  Auto Eosinophil # : x  Auto Basophil # : x  Auto Neutrophil % : x  Auto Lymphocyte % : x  Auto Monocyte % : x  Auto Eosinophil % : x  Auto Basophil % : x    04-23    140  |  104  |  28<H>  ----------------------------<  115<H>  4.5   |  21<L>  |  0.71    Ca    9.3      23 Apr 2020 05:41    TPro  6.5  /  Alb  4.0  /  TBili  0.4  /  DBili  x   /  AST  18  /  ALT  20  /  AlkPhos  109  04-22    LIVER FUNCTIONS - ( 22 Apr 2020 19:07 )  Alb: 4.0 g/dL / Pro: 6.5 g/dL / ALK PHOS: 109 U/L / ALT: 20 U/L / AST: 18 U/L / GGT: x           PT/INR - ( 23 Apr 2020 05:41 )   PT: 11.8 sec;   INR: 1.03 ratio         PTT - ( 23 Apr 2020 05:41 )  PTT:27.0 sec        CBC Full  -  ( 22 Apr 2020 19:07 )  WBC Count : 6.45 K/uL  RBC Count : 4.01 M/uL  Hemoglobin : 13.2 g/dL  Hematocrit : 39.7 %  Platelet Count - Automated : 176 K/uL  Mean Cell Volume : 99.0 fl  Mean Cell Hemoglobin : 32.9 pg  Mean Cell Hemoglobin Concentration : 33.2 gm/dL  Auto Neutrophil # : 4.62 K/uL  Auto Lymphocyte # : 1.33 K/uL  Auto Monocyte # : 0.42 K/uL  Auto Eosinophil # : 0.03 K/uL  Auto Basophil # : 0.02 K/uL  Auto Neutrophil % : 71.6 %  Auto Lymphocyte % : 20.6 %  Auto Monocyte % : 6.5 %  Auto Eosinophil % : 0.5 %  Auto Basophil % : 0.3 %    04-22    139  |  102  |  28<H>  ----------------------------<  116<H>  4.5   |  23  |  0.89    Ca    9.8      22 Apr 2020 19:07    TPro  6.5  /  Alb  4.0  /  TBili  0.4  /  DBili  x   /  AST  18  /  ALT  20  /  AlkPhos  109  04-22    LIVER FUNCTIONS - ( 22 Apr 2020 19:07 )  Alb: 4.0 g/dL / Pro: 6.5 g/dL / ALK PHOS: 109 U/L / ALT: 20 U/L / AST: 18 U/L / GGT: x           PT/INR - ( 22 Apr 2020 19:07 )   PT: 10.9 sec;   INR: 0.95 ratio         PTT - ( 22 Apr 2020 19:07 )  PTT:26.1 sec    CAPILLARY BLOOD GLUCOSE          RADIOLOGY & ADDITIONAL TESTS:    Consultant(s):    Care Discussed with Consultants/Other Providers [ ] YES  [ ] NO

## 2020-04-25 NOTE — PROGRESS NOTE ADULT - ASSESSMENT
86 yo female with hx of HTN, HLD, dementia, lung CA coming in with left hip and wrist pain after a mechanical trip and fall this afternoon.  Pt was with her daughter in the living room, spun and lost her balance falling to her left side.  Did not hit head no LOC.  No neck or back pain.  No numbness or weakness.  Pain in the left hip with deformity.  Pain is worse with movement, better with immobilization.  Similar complaints for left wrist pain.  No other complaints at this time    Tolerated surgery well  Vigorous incentive spirometry and fluids  and PRBC transfusion  as needed.  Yesterday patient was hypotensive after OR. Pressors were eventually weaned but patient did have episode of vasovagal in PACU. Combination of hypotension with possible AMS led to SICU consult and ultimate SICU monitoring overnight. Patient received 1 unit of PRBC and appropriately responded.     Now No sob or chest pain   NO fever or chills  stressed incentive spirometry.  Doing well on regular floor  Stressed incentive spirometry. Start PT.  D/C plan for rehab when ready

## 2020-04-26 ENCOUNTER — TRANSCRIPTION ENCOUNTER (OUTPATIENT)
Age: 85
End: 2020-04-26

## 2020-04-26 LAB
BLD GP AB SCN SERPL QL: NEGATIVE — SIGNIFICANT CHANGE UP
HCT VFR BLD CALC: 22 % — LOW (ref 34.5–45)
HCT VFR BLD CALC: 26.1 % — LOW (ref 34.5–45)
HGB BLD-MCNC: 7.6 G/DL — LOW (ref 11.5–15.5)
HGB BLD-MCNC: 8.6 G/DL — LOW (ref 11.5–15.5)
MCHC RBC-ENTMCNC: 30.9 PG — SIGNIFICANT CHANGE UP (ref 27–34)
MCHC RBC-ENTMCNC: 32.6 PG — SIGNIFICANT CHANGE UP (ref 27–34)
MCHC RBC-ENTMCNC: 33 GM/DL — SIGNIFICANT CHANGE UP (ref 32–36)
MCHC RBC-ENTMCNC: 34.5 GM/DL — SIGNIFICANT CHANGE UP (ref 32–36)
MCV RBC AUTO: 93.9 FL — SIGNIFICANT CHANGE UP (ref 80–100)
MCV RBC AUTO: 94.4 FL — SIGNIFICANT CHANGE UP (ref 80–100)
NRBC # BLD: 0 /100 WBCS — SIGNIFICANT CHANGE UP (ref 0–0)
NRBC # BLD: 0 /100 WBCS — SIGNIFICANT CHANGE UP (ref 0–0)
PLATELET # BLD AUTO: 104 K/UL — LOW (ref 150–400)
PLATELET # BLD AUTO: 116 K/UL — LOW (ref 150–400)
RBC # BLD: 2.33 M/UL — LOW (ref 3.8–5.2)
RBC # BLD: 2.78 M/UL — LOW (ref 3.8–5.2)
RBC # FLD: 15.4 % — HIGH (ref 10.3–14.5)
RBC # FLD: 15.9 % — HIGH (ref 10.3–14.5)
RH IG SCN BLD-IMP: POSITIVE — SIGNIFICANT CHANGE UP
WBC # BLD: 6.49 K/UL — SIGNIFICANT CHANGE UP (ref 3.8–10.5)
WBC # BLD: 7.96 K/UL — SIGNIFICANT CHANGE UP (ref 3.8–10.5)
WBC # FLD AUTO: 6.49 K/UL — SIGNIFICANT CHANGE UP (ref 3.8–10.5)
WBC # FLD AUTO: 7.96 K/UL — SIGNIFICANT CHANGE UP (ref 3.8–10.5)

## 2020-04-26 RX ADMIN — ATORVASTATIN CALCIUM 20 MILLIGRAM(S): 80 TABLET, FILM COATED ORAL at 22:01

## 2020-04-26 RX ADMIN — Medication 975 MILLIGRAM(S): at 17:22

## 2020-04-26 RX ADMIN — POLYETHYLENE GLYCOL 3350 17 GRAM(S): 17 POWDER, FOR SOLUTION ORAL at 13:13

## 2020-04-26 RX ADMIN — SENNA PLUS 2 TABLET(S): 8.6 TABLET ORAL at 22:01

## 2020-04-26 RX ADMIN — Medication 81 MILLIGRAM(S): at 13:12

## 2020-04-26 RX ADMIN — ENOXAPARIN SODIUM 40 MILLIGRAM(S): 100 INJECTION SUBCUTANEOUS at 13:13

## 2020-04-26 RX ADMIN — Medication 3 MILLIGRAM(S): at 22:01

## 2020-04-26 NOTE — DISCHARGE NOTE PROVIDER - CARE PROVIDER_API CALL
Ernie Forrest)  Orthopaedic Surgery  [address] [place] [address], [address]  [place] [address]  Phone: [phone]  Fax: [phone]  Follow Up Time:

## 2020-04-26 NOTE — DISCHARGE NOTE PROVIDER - NSDCACTIVITY_GEN_ALL_CORE
Walking - Indoors allowed/Do not drive or operate machinery/Do not make important decisions/Stairs allowed/may shower however must keep SAC dry    Physical therapy for ambulation WBAT LLE and NWB LUE.  Lovenox x 6 weeks for DVT prophylaxis/Walking - Outdoors allowed

## 2020-04-26 NOTE — DISCHARGE NOTE PROVIDER - NSDCMRMEDTOKEN_GEN_ALL_CORE_FT
acetaminophen 325 mg oral tablet: 2 tab(s) orally every 6 hours  amLODIPine 5 mg oral tablet: 1 tab(s) orally once a day in am  aspirin 81 mg oral tablet: 1 tab(s) orally once a day in am    Crestor 5 mg oral tablet: 1 tab(s) orally once a day (at bedtime)  docusate sodium 100 mg oral capsule: 1 cap(s) orally 3 times a day  losartan 50 mg oral tablet: 1 tab(s) orally once a day in pm  senna oral tablet: 2 tab(s) orally once a day (at bedtime)  Vitamin D3 1000 intl units oral capsule: 1 cap(s) orally 2 times a day acetaminophen 325 mg oral tablet: 3 tab(s) orally every 8 hours, As needed, Mild Pain (1 - 3)  amLODIPine 5 mg oral tablet: 1 tab(s) orally once a day in am  Hold SBP  &lt;110  aspirin 81 mg oral tablet: 1 tab(s) orally once a day in am    Crestor 5 mg oral tablet: 1 tab(s) orally once a day (at bedtime)  enoxaparin: 40 milligram(s) subcutaneous once a day x 6 weeks for DVT prophylaxis  losartan 50 mg oral tablet: 1 tab(s) orally once a day in pm  Hold SBP&lt;110  melatonin 3 mg oral tablet: 1 tab(s) orally once a day (at bedtime), As needed, Insomnia  polyethylene glycol 3350 oral powder for reconstitution: 17 gram(s) orally once a day  senna oral tablet: 2 tab(s) orally once a day (at bedtime)  Vitamin D3 1000 intl units oral capsule: 1 cap(s) orally 2 times a day

## 2020-04-26 NOTE — DISCHARGE NOTE PROVIDER - NSDCCPCAREPLAN_GEN_ALL_CORE_FT
PRINCIPAL DISCHARGE DIAGNOSIS  Diagnosis: Closed fracture of left hip, initial encounter  Assessment and Plan of Treatment:       SECONDARY DISCHARGE DIAGNOSES  Diagnosis: Closed fracture of left hip, initial encounter  Assessment and Plan of Treatment:     Diagnosis: Wrist fracture, closed, left, initial encounter  Assessment and Plan of Treatment:

## 2020-04-26 NOTE — PROGRESS NOTE ADULT - ASSESSMENT
86 yo female with hx of HTN, HLD, dementia, lung CA coming in with left hip and wrist pain after a mechanical trip and fall this afternoon.  Pt was with her daughter in the living room, spun and lost her balance falling to her left side.  Did not hit head no LOC.  No neck or back pain.  No numbness or weakness.  Pain in the left hip with deformity.  Pain is worse with movement, better with immobilization.  Similar complaints for left wrist pain.  No other complaints at this time    Tolerated surgery well  Vigorous incentive spirometry and fluids  and PRBC transfusion  as needed.  Yesterday patient was hypotensive after OR. Pressors were eventually weaned but patient did have episode of vasovagal in PACU. Combination of hypotension with possible AMS led to SICU consult and ultimate SICU monitoring overnight. Patient received 1 unit of PRBC and appropriately responded.     Now No sob or chest pain   NO fever or chills  stressed incentive spirometry.  Doing well on regular floor  Stressed incentive spirometry. Start PT.  D/C plan for rehab when ready    Hgn decreased to 7.6  needs 2 units PRBC transfusion

## 2020-04-26 NOTE — PROGRESS NOTE ADULT - SUBJECTIVE AND OBJECTIVE BOX
Patient is a 87y old  Female who presents with a chief complaint of hip pain /injury  	      HPI:  84 yo female with hx of HTN, HLD, dementia, lung CA coming in with left hip and wrist pain after a mechanical trip and fall this afternoon.  Pt was with her daughter in the living room, spun and lost her balance falling to her left side.  Did not hit head no LOC.  No neck or back pain.  No numbness or weakness.  Pain in the left hip with deformity.  Pain is worse with movement, better with immobilization.  Similar complaints for left wrist pain.  No other complaints at this time.  Hip fracture 23-Apr-2020 07:34:46  Juve Cabello  ·  POST-OP DIAGNOSIS:  Hip fracture 23-Apr-2020 07:35:01  Juve Cabello  ·  PROCEDURES:  ORIF, hip, with intramedullary deidra 23-Apr-2020 07:34:35  Juve Cabello   and PRBC transfusion as needed     No sob or chest pain NO nausea or vomiting.  Stressed incentive spirometry.  MEDICATIONS  (STANDING):  atorvastatin 20 milliGRAM(s) Oral at bedtime  senna 2 Tablet(s) Oral at bedtime  sodium chloride 0.9%. 1000 milliLiter(s) (50 mL/Hr) IV Continuous <Continuous>    MEDICATIONS  (PRN):  acetaminophen   Tablet .. 975 milliGRAM(s) Oral every 8 hours PRN Mild Pain (1 - 3)  melatonin 3 milliGRAM(s) Oral at bedtime PRN Insomnia        Vital Signs Last 24 Hrs  T(C): 36.8 (26 Apr 2020 08:47), Max: 36.9 (25 Apr 2020 21:32)  T(F): 98.2 (26 Apr 2020 08:47), Max: 98.4 (25 Apr 2020 21:32)  HR: 57 (26 Apr 2020 08:47) (57 - 81)  BP: 108/50 (26 Apr 2020 08:47) (108/50 - 125/66)  BP(mean): --  RR: 18 (26 Apr 2020 08:47) (18 - 18)  SpO2: 96% (26 Apr 2020 08:47) (96% - 98%)        PHYSICAL EXAM:  GENERAL: NAD, well nourished and conversant  HEAD:  Atraumatic  EYES: EOM, PERRLA, conjunctiva pink and sclera white  ENT: No tonsillar erythema, exudates, or enlargement, moist mucous membranes, good dentition, no lesions  NECK: Supple, No JVD, normal thyroid, carotids with normal upstrokes and no bruits  CHEST/LUNG: Clear to auscultation bilaterally, No rales, rhonchi, wheezing, or rubs  HEART: Regular rate and rhythm, No murmurs, rubs, or gallops  ABDOMEN: Soft, nondistended, no masses, guarding, tenderness or rebound, bowel sounds present  EXTREMITIES:  2+ Peripheral Pulses, No clubbing, cyanosis, or edema.  Left hip fracture(+) IM deidra  LYMPH: No lymphadenopathy noted  SKIN: No rashes or lesions  NERVOUS SYSTEM:  Alert & Oriented X3, normal cognitive function. Motor Strength 5/5 right upper and right lower.  5/5 left upper and left lower extremities, DTRs 2+ intact and symmetric          CBC Full  -  ( 26 Apr 2020 06:33 )  WBC Count : 7.96 K/uL  RBC Count : 2.33 M/uL  Hemoglobin : 7.6 g/dL  Hematocrit : 22.0 %  Platelet Count - Automated : 104 K/uL  Mean Cell Volume : 94.4 fl  Mean Cell Hemoglobin : 32.6 pg  Mean Cell Hemoglobin Concentration : 34.5 gm/dL  Auto Neutrophil # : x  Auto Lymphocyte # : x  Auto Monocyte # : x  Auto Eosinophil # : x  Auto Basophil # : x  Auto Neutrophil % : x  Auto Lymphocyte % : x  Auto Monocyte % : x  Auto Eosinophil % : x  Auto Basophil % : x    04-25    137  |  104  |  27<H>  ----------------------------<  99  3.7   |  22  |  0.60    Ca    8.4      25 Apr 2020 06:13              CBC Full  -  ( 24 Apr 2020 07:49 )  WBC Count : 9.83 K/uL  RBC Count : 2.59 M/uL  Hemoglobin : 8.3 g/dL  Hematocrit : 25.2 %  Platelet Count - Automated : 102 K/uL  Mean Cell Volume : 97.3 fl  Mean Cell Hemoglobin : 32.0 pg  Mean Cell Hemoglobin Concentration : 32.9 gm/dL  Auto Neutrophil # : x  Auto Lymphocyte # : x  Auto Monocyte # : x  Auto Eosinophil # : x  Auto Basophil # : x  Auto Neutrophil % : x  Auto Lymphocyte % : x  Auto Monocyte % : x  Auto Eosinophil % : x  Auto Basophil % : x    04-24    141  |  111<H>  |  31<H>  ----------------------------<  104<H>  3.8   |  20<L>  |  0.83    Ca    7.5<L>      24 Apr 2020 05:41  Phos  3.3     04-24  Mg     1.7     04-24    TPro  6.5  /  Alb  4.0  /  TBili  0.4  /  DBili  x   /  AST  18  /  ALT  20  /  AlkPhos  109  04-22    LIVER FUNCTIONS - ( 22 Apr 2020 19:07 )  Alb: 4.0 g/dL / Pro: 6.5 g/dL / ALK PHOS: 109 U/L / ALT: 20 U/L / AST: 18 U/L / GGT: x           PT/INR - ( 24 Apr 2020 05:41 )   PT: 13.6 sec;   INR: 1.19 ratio         PTT - ( 24 Apr 2020 05:41 )  PTT:24.6 sec      CBC Full  -  ( 23 Apr 2020 05:41 )  WBC Count : 8.76 K/uL  RBC Count : 3.25 M/uL  Hemoglobin : 10.7 g/dL  Hematocrit : 32.2 %  Platelet Count - Automated : 140 K/uL  Mean Cell Volume : 99.1 fl  Mean Cell Hemoglobin : 32.9 pg  Mean Cell Hemoglobin Concentration : 33.2 gm/dL  Auto Neutrophil # : x  Auto Lymphocyte # : x  Auto Monocyte # : x  Auto Eosinophil # : x  Auto Basophil # : x  Auto Neutrophil % : x  Auto Lymphocyte % : x  Auto Monocyte % : x  Auto Eosinophil % : x  Auto Basophil % : x    04-23    140  |  104  |  28<H>  ----------------------------<  115<H>  4.5   |  21<L>  |  0.71    Ca    9.3      23 Apr 2020 05:41    TPro  6.5  /  Alb  4.0  /  TBili  0.4  /  DBili  x   /  AST  18  /  ALT  20  /  AlkPhos  109  04-22    LIVER FUNCTIONS - ( 22 Apr 2020 19:07 )  Alb: 4.0 g/dL / Pro: 6.5 g/dL / ALK PHOS: 109 U/L / ALT: 20 U/L / AST: 18 U/L / GGT: x           PT/INR - ( 23 Apr 2020 05:41 )   PT: 11.8 sec;   INR: 1.03 ratio         PTT - ( 23 Apr 2020 05:41 )  PTT:27.0 sec        CBC Full  -  ( 22 Apr 2020 19:07 )  WBC Count : 6.45 K/uL  RBC Count : 4.01 M/uL  Hemoglobin : 13.2 g/dL  Hematocrit : 39.7 %  Platelet Count - Automated : 176 K/uL  Mean Cell Volume : 99.0 fl  Mean Cell Hemoglobin : 32.9 pg  Mean Cell Hemoglobin Concentration : 33.2 gm/dL  Auto Neutrophil # : 4.62 K/uL  Auto Lymphocyte # : 1.33 K/uL  Auto Monocyte # : 0.42 K/uL  Auto Eosinophil # : 0.03 K/uL  Auto Basophil # : 0.02 K/uL  Auto Neutrophil % : 71.6 %  Auto Lymphocyte % : 20.6 %  Auto Monocyte % : 6.5 %  Auto Eosinophil % : 0.5 %  Auto Basophil % : 0.3 %    04-22    139  |  102  |  28<H>  ----------------------------<  116<H>  4.5   |  23  |  0.89    Ca    9.8      22 Apr 2020 19:07    TPro  6.5  /  Alb  4.0  /  TBili  0.4  /  DBili  x   /  AST  18  /  ALT  20  /  AlkPhos  109  04-22    LIVER FUNCTIONS - ( 22 Apr 2020 19:07 )  Alb: 4.0 g/dL / Pro: 6.5 g/dL / ALK PHOS: 109 U/L / ALT: 20 U/L / AST: 18 U/L / GGT: x           PT/INR - ( 22 Apr 2020 19:07 )   PT: 10.9 sec;   INR: 0.95 ratio         PTT - ( 22 Apr 2020 19:07 )  PTT:26.1 sec    CAPILLARY BLOOD GLUCOSE          RADIOLOGY & ADDITIONAL TESTS:    Consultant(s):    Care Discussed with Consultants/Other Providers [ ] YES  [ ] NO

## 2020-04-26 NOTE — PROGRESS NOTE ADULT - SUBJECTIVE AND OBJECTIVE BOX
Orthopaedic Surgery Progress Note    Subjective:   Patient seen and examined, pain well controlled, no acute events overnight.    Vital Signs Last 24 Hrs  T(C): 36.7 (26 Apr 2020 04:26), Max: 36.9 (25 Apr 2020 21:32)  T(F): 98.1 (26 Apr 2020 04:26), Max: 98.4 (25 Apr 2020 21:32)  HR: 81 (25 Apr 2020 21:32) (74 - 81)  BP: 124/61 (26 Apr 2020 04:26) (124/61 - 131/65)  BP(mean): --  RR: 18 (26 Apr 2020 04:26) (18 - 18)  SpO2: 97% (26 Apr 2020 04:26) (97% - 100%)    PE    NAD  LLE:   dressing C/D/I  motor intact GS/TA/EHL  SILT S/S/SP/DP/T  WWP    LUE:  SAC, clean/dry/intact  motor AIN/PIN/U intact  SILT M/U/R  wwp                            87y Female s/p Left femur IMN POD2 and SAC for L distal radius fracture  - monitor pressure/vitals  - WBAT LLE   - NWB LUE in SAC  - PT/OT/OOB  - DVT ppx - Lovenox  - Dispo: Rehab

## 2020-04-26 NOTE — DISCHARGE NOTE PROVIDER - HOSPITAL COURSE
Patient was admitted to undergo L hip IMN and L DR fx CR and casting.  The patient tolerated the procedure well and was transferred to the floor in stable condition. Postoperatively, the patient's pain was well controlled with IV pain medications and later transitioned to PO pain meds, with adequate pain control. The patient participated well with PT starting POD1. Patient is stable for discharge at this time. Patient in agreement with discharge plan.            Please follow up with Dr Forrest in 1-2 weeks. Call office to make an appointment. Please follow up with your PMD for continuity of care and management. Please keep dressing in place until post op day # 14. Any sutures/staples to be removed on post op day # 14.  Please keep your cast clean, dry, and intact. Elevate your arm to help with swelling. Do not get cast wet. Please take ____ for dvt prophylaxis / blood thinner. Weight bearing as tolerated on your legs but do not bear weight with your left arm. Call orthopaedics with any questions.        WOUND CARE:  Please keep incisions clean and dry. Please do not Scrub or rub incisions. Do not use lotion or powder on incisions.     BATHING: You may shower and/or sponge bathe but do not get your cast wet otherwise it will have to be removed and replaced.     ACTIVITY: No heavy lifting or straining. Otherwise, you may return to your usual level of physical activity. If you are taking narcotic pain medication DO NOT drive a car, operate machinery or make important decisions.    DIET: Return to your usual diet.    NOTIFY YOUR SURGEON IF: You have any bleeding that does not stop, any pus draining from your wound(s), any fever (over 100.4 F) persistent nausea/vomiting, or if your pain is not controlled on your discharge pain medications, unable to urinate.    Please follow up with your primary care physician in one week regarding your hospitalization, bring copies of your discharge paperwork.    Please follow up with your surgeon, Dr. Forrest Patient was admitted to undergo L hip IMN and L DR fx CR and casting.  The patient tolerated the procedure well and was transferred to the floor in stable condition. Postoperatively, the patient's pain was well controlled with IV pain medications and later transitioned to PO pain meds, with adequate pain control. The patient participated well with PT starting POD1. Patient is stable for discharge at this time. Patient in agreement with discharge plan.            Please follow up with Dr Forrest in 2 weeks. Call office to make an appointment. Please follow up with your PMD for continuity of care and management. Please keep dressing in place until post op day # 14. Any sutures/staples to be removed on post op day # 14.  Please keep your cast clean, dry, and intact. Elevate your arm to help with swelling. Do not get cast wet. Please take lovenox for dvt prophylaxis / blood thinner. Weight bearing as tolerated on your legs but do not bear weight with your left arm. Call orthopaedics with any questions.        WOUND CARE:  Please keep incisions clean and dry. Please do not Scrub or rub incisions. Do not use lotion or powder on incisions.     BATHING: You may shower and/or sponge bathe but do not get your cast wet otherwise it will have to be removed and replaced.     ACTIVITY: No heavy lifting or straining. Otherwise, you may return to your usual level of physical activity. If you are taking narcotic pain medication DO NOT drive a car, operate machinery or make important decisions.    DIET: Return to your usual diet.    NOTIFY YOUR SURGEON IF: You have any bleeding that does not stop, any pus draining from your wound(s), any fever (over 100.4 F) persistent nausea/vomiting, or if your pain is not controlled on your discharge pain medications, unable to urinate.    Please follow up with your primary care physician in one week regarding your hospitalization, bring copies of your discharge paperwork.    Please follow up with your surgeon, Dr. Forrest

## 2020-04-26 NOTE — DISCHARGE NOTE PROVIDER - NSDCFUSCHEDAPPT_GEN_ALL_CORE_FT
ALEXANDRA ZUNIGA ; 06/02/2020 ; [facility] [address]  ALEXANDRA ZUNIGA ; 06/11/2020 ; [facility] [address]

## 2020-04-27 ENCOUNTER — TRANSCRIPTION ENCOUNTER (OUTPATIENT)
Age: 85
End: 2020-04-27

## 2020-04-27 VITALS
TEMPERATURE: 98 F | OXYGEN SATURATION: 98 % | HEART RATE: 73 BPM | DIASTOLIC BLOOD PRESSURE: 55 MMHG | RESPIRATION RATE: 20 BRPM | SYSTOLIC BLOOD PRESSURE: 111 MMHG

## 2020-04-27 LAB
ANION GAP SERPL CALC-SCNC: 8 MMOL/L — SIGNIFICANT CHANGE UP (ref 5–17)
BUN SERPL-MCNC: 22 MG/DL — SIGNIFICANT CHANGE UP (ref 7–23)
CALCIUM SERPL-MCNC: 9.1 MG/DL — SIGNIFICANT CHANGE UP (ref 8.4–10.5)
CHLORIDE SERPL-SCNC: 105 MMOL/L — SIGNIFICANT CHANGE UP (ref 96–108)
CO2 SERPL-SCNC: 25 MMOL/L — SIGNIFICANT CHANGE UP (ref 22–31)
CREAT SERPL-MCNC: 0.6 MG/DL — SIGNIFICANT CHANGE UP (ref 0.5–1.3)
GLUCOSE SERPL-MCNC: 105 MG/DL — HIGH (ref 70–99)
HCT VFR BLD CALC: 29.7 % — LOW (ref 34.5–45)
HGB BLD-MCNC: 9.6 G/DL — LOW (ref 11.5–15.5)
MCHC RBC-ENTMCNC: 30.8 PG — SIGNIFICANT CHANGE UP (ref 27–34)
MCHC RBC-ENTMCNC: 32.3 GM/DL — SIGNIFICANT CHANGE UP (ref 32–36)
MCV RBC AUTO: 95.2 FL — SIGNIFICANT CHANGE UP (ref 80–100)
NRBC # BLD: 0 /100 WBCS — SIGNIFICANT CHANGE UP (ref 0–0)
PLATELET # BLD AUTO: 147 K/UL — LOW (ref 150–400)
POTASSIUM SERPL-MCNC: 3.8 MMOL/L — SIGNIFICANT CHANGE UP (ref 3.5–5.3)
POTASSIUM SERPL-SCNC: 3.8 MMOL/L — SIGNIFICANT CHANGE UP (ref 3.5–5.3)
RBC # BLD: 3.12 M/UL — LOW (ref 3.8–5.2)
RBC # FLD: 16 % — HIGH (ref 10.3–14.5)
SARS-COV-2 RNA SPEC QL NAA+PROBE: SIGNIFICANT CHANGE UP
SODIUM SERPL-SCNC: 138 MMOL/L — SIGNIFICANT CHANGE UP (ref 135–145)
WBC # BLD: 7.25 K/UL — SIGNIFICANT CHANGE UP (ref 3.8–10.5)
WBC # FLD AUTO: 7.25 K/UL — SIGNIFICANT CHANGE UP (ref 3.8–10.5)

## 2020-04-27 PROCEDURE — 82947 ASSAY GLUCOSE BLOOD QUANT: CPT

## 2020-04-27 PROCEDURE — 96375 TX/PRO/DX INJ NEW DRUG ADDON: CPT

## 2020-04-27 PROCEDURE — 86900 BLOOD TYPING SEROLOGIC ABO: CPT

## 2020-04-27 PROCEDURE — C1776: CPT

## 2020-04-27 PROCEDURE — 97161 PT EVAL LOW COMPLEX 20 MIN: CPT

## 2020-04-27 PROCEDURE — 76000 FLUOROSCOPY <1 HR PHYS/QHP: CPT

## 2020-04-27 PROCEDURE — 80053 COMPREHEN METABOLIC PANEL: CPT

## 2020-04-27 PROCEDURE — 83735 ASSAY OF MAGNESIUM: CPT

## 2020-04-27 PROCEDURE — 83605 ASSAY OF LACTIC ACID: CPT

## 2020-04-27 PROCEDURE — 73502 X-RAY EXAM HIP UNI 2-3 VIEWS: CPT

## 2020-04-27 PROCEDURE — 84100 ASSAY OF PHOSPHORUS: CPT

## 2020-04-27 PROCEDURE — C1713: CPT

## 2020-04-27 PROCEDURE — 87635 SARS-COV-2 COVID-19 AMP PRB: CPT

## 2020-04-27 PROCEDURE — 82330 ASSAY OF CALCIUM: CPT

## 2020-04-27 PROCEDURE — 86850 RBC ANTIBODY SCREEN: CPT

## 2020-04-27 PROCEDURE — 71045 X-RAY EXAM CHEST 1 VIEW: CPT

## 2020-04-27 PROCEDURE — C1889: CPT

## 2020-04-27 PROCEDURE — 85027 COMPLETE CBC AUTOMATED: CPT

## 2020-04-27 PROCEDURE — 86923 COMPATIBILITY TEST ELECTRIC: CPT

## 2020-04-27 PROCEDURE — 73090 X-RAY EXAM OF FOREARM: CPT

## 2020-04-27 PROCEDURE — 97116 GAIT TRAINING THERAPY: CPT

## 2020-04-27 PROCEDURE — 82435 ASSAY OF BLOOD CHLORIDE: CPT

## 2020-04-27 PROCEDURE — 36430 TRANSFUSION BLD/BLD COMPNT: CPT

## 2020-04-27 PROCEDURE — 84295 ASSAY OF SERUM SODIUM: CPT

## 2020-04-27 PROCEDURE — P9016: CPT

## 2020-04-27 PROCEDURE — 85014 HEMATOCRIT: CPT

## 2020-04-27 PROCEDURE — 73552 X-RAY EXAM OF FEMUR 2/>: CPT

## 2020-04-27 PROCEDURE — 80048 BASIC METABOLIC PNL TOTAL CA: CPT

## 2020-04-27 PROCEDURE — 86901 BLOOD TYPING SEROLOGIC RH(D): CPT

## 2020-04-27 PROCEDURE — 96376 TX/PRO/DX INJ SAME DRUG ADON: CPT

## 2020-04-27 PROCEDURE — 97530 THERAPEUTIC ACTIVITIES: CPT

## 2020-04-27 PROCEDURE — 84132 ASSAY OF SERUM POTASSIUM: CPT

## 2020-04-27 PROCEDURE — 85730 THROMBOPLASTIN TIME PARTIAL: CPT

## 2020-04-27 PROCEDURE — 96374 THER/PROPH/DIAG INJ IV PUSH: CPT

## 2020-04-27 PROCEDURE — 99285 EMERGENCY DEPT VISIT HI MDM: CPT | Mod: 25

## 2020-04-27 PROCEDURE — 82962 GLUCOSE BLOOD TEST: CPT

## 2020-04-27 PROCEDURE — 82803 BLOOD GASES ANY COMBINATION: CPT

## 2020-04-27 PROCEDURE — 73110 X-RAY EXAM OF WRIST: CPT

## 2020-04-27 PROCEDURE — 85610 PROTHROMBIN TIME: CPT

## 2020-04-27 PROCEDURE — 93005 ELECTROCARDIOGRAM TRACING: CPT

## 2020-04-27 RX ORDER — POLYETHYLENE GLYCOL 3350 17 G/17G
17 POWDER, FOR SOLUTION ORAL
Qty: 0 | Refills: 0 | DISCHARGE
Start: 2020-04-27

## 2020-04-27 RX ORDER — LANOLIN ALCOHOL/MO/W.PET/CERES
1 CREAM (GRAM) TOPICAL
Qty: 0 | Refills: 0 | DISCHARGE
Start: 2020-04-27

## 2020-04-27 RX ORDER — ENOXAPARIN SODIUM 100 MG/ML
40 INJECTION SUBCUTANEOUS
Qty: 0 | Refills: 0 | DISCHARGE
Start: 2020-04-27

## 2020-04-27 RX ORDER — ACETAMINOPHEN 500 MG
3 TABLET ORAL
Qty: 0 | Refills: 0 | DISCHARGE
Start: 2020-04-27

## 2020-04-27 RX ORDER — LOSARTAN POTASSIUM 100 MG/1
1 TABLET, FILM COATED ORAL
Qty: 0 | Refills: 0 | DISCHARGE

## 2020-04-27 RX ORDER — AMLODIPINE BESYLATE 2.5 MG/1
1 TABLET ORAL
Qty: 0 | Refills: 0 | DISCHARGE

## 2020-04-27 RX ADMIN — ENOXAPARIN SODIUM 40 MILLIGRAM(S): 100 INJECTION SUBCUTANEOUS at 13:08

## 2020-04-27 RX ADMIN — POLYETHYLENE GLYCOL 3350 17 GRAM(S): 17 POWDER, FOR SOLUTION ORAL at 13:08

## 2020-04-27 RX ADMIN — Medication 81 MILLIGRAM(S): at 13:08

## 2020-04-27 RX ADMIN — ATORVASTATIN CALCIUM 20 MILLIGRAM(S): 80 TABLET, FILM COATED ORAL at 21:50

## 2020-04-27 RX ADMIN — Medication 975 MILLIGRAM(S): at 13:09

## 2020-04-27 RX ADMIN — SENNA PLUS 2 TABLET(S): 8.6 TABLET ORAL at 21:50

## 2020-04-27 NOTE — DISCHARGE NOTE NURSING/CASE MANAGEMENT/SOCIAL WORK - NSCORESITESY/N_GEN_A_CORE_RD
No Discontinue Regimen: Vitamin E 400 iu bid Plan: Isotretinoin plan paperwork given last visit. Lab slip given to patient to be completed before next visit.\\n\\nPatient is having joint pains, back pain if worsening let us know. Detail Level: Simple

## 2020-04-27 NOTE — PROGRESS NOTE ADULT - REASON FOR ADMISSION
Fall with left hip pain

## 2020-04-27 NOTE — PROGRESS NOTE ADULT - PROBLEM SELECTOR PLAN 1
Stable  post op IM deidra
Stable  post op IM deidra after fluids and PRBC transfusion and transient use of pressors which ar now d/adam.
Stable  post op IM deidra after fluids and PRBC transfusion and transient use of pressors which ar now d/adam.
Stable  post op IM deidra after fluids and PRBC transfusion and transient use of pressors which ar now d/adam.  Transfuse PRBC for Hgn 7.6
continue physical therapy as tolerated  Pain meds as needed   follow for pover sedation
06-Aug-2019 23:36:09

## 2020-04-27 NOTE — DISCHARGE NOTE NURSING/CASE MANAGEMENT/SOCIAL WORK - PATIENT PORTAL LINK FT
You can access the FollowMyHealth Patient Portal offered by [facility] by registering at the following website: [email or web address]. By joining [email or web address] FollowMyHealth portal, you will also be able to view your health information using other applications (apps) compatible with our system.

## 2020-04-27 NOTE — PROGRESS NOTE ADULT - SUBJECTIVE AND OBJECTIVE BOX
Orthopaedic Surgery Progress Note    Subjective:   Patient seen and examined, pain well controlled, 1U PRBC given during the day yesterday for low H/H which responded appropriately    Vital Signs Last 24 Hrs  T(C): 36.7 (27 Apr 2020 04:24), Max: 36.8 (26 Apr 2020 08:47)  T(F): 98.1 (27 Apr 2020 04:24), Max: 98.2 (26 Apr 2020 08:47)  HR: 71 (26 Apr 2020 20:38) (57 - 74)  BP: 125/72 (27 Apr 2020 04:24) (98/52 - 125/72)  BP(mean): --  RR: 18 (27 Apr 2020 04:24) (18 - 18)  SpO2: 99% (27 Apr 2020 04:24) (95% - 100%)    PE    NAD  LLE:   dressing removed, incisions c/d/i  motor intact GS/TA/EHL  SILT S/S/SP/DP/T  WWP    LUE:  SAC, clean/dry/intact  motor AIN/PIN/U intact  SILT M/U/R  wwp                            8.6    6.49  )-----------( 116      ( 26 Apr 2020 16:02 )             26.1   04-25    137  |  104  |  27<H>  ----------------------------<  99  3.7   |  22  |  0.60    Ca    8.4      25 Apr 2020 06:13                          87y Female s/p Left femur IMN POD2 and SAC for L distal radius fracture  - monitor pressure/vitals  - WBAT LLE   - NWB LUE in SAC  - PT/OT/OOB  - DVT ppx - Lovenox, A81  - Dispo: Rehab

## 2020-04-27 NOTE — PROGRESS NOTE ADULT - SUBJECTIVE AND OBJECTIVE BOX
86 yo female with hx of HTN, HLD, dementia, lung CA coming in with left hip and wrist pain after a mechanical trip and fall this afternoon.  Pt was with her daughter in the living room, spun and lost her balance falling to her left side.  Did not hit head no LOC.  No neck or back pain.  No numbness or weakness.  Pain in the left hip with deformity.  Pain is worse with movement, better with immobilization.  Patient found to have a left hip fx. Patient seen s/p Open reduction and internal fixation of left IMN      MEDICATIONS  (STANDING):  aspirin enteric coated 81 milliGRAM(s) Oral daily  atorvastatin 20 milliGRAM(s) Oral at bedtime  enoxaparin Injectable 40 milliGRAM(s) SubCutaneous daily  polyethylene glycol 3350 17 Gram(s) Oral daily  senna 2 Tablet(s) Oral at bedtime    MEDICATIONS  (PRN):  acetaminophen   Tablet .. 975 milliGRAM(s) Oral every 8 hours PRN Mild Pain (1 - 3)  melatonin 3 milliGRAM(s) Oral at bedtime PRN Insomnia          VITALS:   T(C): 36.5 (04-27-20 @ 19:34), Max: 36.8 (04-26-20 @ 20:38)  HR: 73 (04-27-20 @ 19:34) (71 - 74)  BP: 111/55 (04-27-20 @ 19:34) (95/58 - 125/72)  RR: 20 (04-27-20 @ 19:34) (18 - 20)  SpO2: 98% (04-27-20 @ 19:34) (95% - 99%)  Wt(kg): --    PHYSICAL EXAM:  GENERAL: NAD, well nourished and conversant  HEAD:  Atraumatic  EYES: EOM, PERRLA, conjunctiva pink and sclera white  ENT: No tonsillar erythema, exudates, or enlargement, moist mucous membranes, good dentition, no lesions  NECK: Supple, No JVD, normal thyroid, carotids with normal upstrokes and no bruits  CHEST/LUNG: Clear to auscultation bilaterally, No rales, rhonchi, wheezing, or rubs  HEART: Regular rate and rhythm, No murmurs, rubs, or gallops  ABDOMEN: Soft, nondistended, no masses, guarding, tenderness or rebound, bowel sounds present  EXTREMITIES:  2+ Peripheral Pulses, No clubbing, cyanosis, or edema.  Left hip fracture(+) IM deidra  LYMPH: No lymphadenopathy noted  SKIN: No rashes or lesions  NERVOUS SYSTEM:  Alert & Oriented X3, normal cognitive function. Motor Strength 5/5 right upper and right lower.  5/5 left upper and left lower extremities, DTRs 2+ intact and symmetric    LABS:        CBC Full  -  ( 27 Apr 2020 09:38 )  WBC Count : 7.25 K/uL  RBC Count : 3.12 M/uL  Hemoglobin : 9.6 g/dL  Hematocrit : 29.7 %  Platelet Count - Automated : 147 K/uL  Mean Cell Volume : 95.2 fl  Mean Cell Hemoglobin : 30.8 pg  Mean Cell Hemoglobin Concentration : 32.3 gm/dL  Auto Neutrophil # : x  Auto Lymphocyte # : x  Auto Monocyte # : x  Auto Eosinophil # : x  Auto Basophil # : x  Auto Neutrophil % : x  Auto Lymphocyte % : x  Auto Monocyte % : x  Auto Eosinophil % : x  Auto Basophil % : x    04-27    138  |  105  |  22  ----------------------------<  105<H>  3.8   |  25  |  0.60    Ca    9.1      27 Apr 2020 09:38            CAPILLARY BLOOD GLUCOSE          RADIOLOGY & ADDITIONAL TESTS:

## 2020-05-14 ENCOUNTER — RESULT REVIEW (OUTPATIENT)
Age: 85
End: 2020-05-14

## 2020-05-14 ENCOUNTER — OUTPATIENT (OUTPATIENT)
Dept: OUTPATIENT SERVICES | Facility: HOSPITAL | Age: 85
LOS: 1 days | End: 2020-05-14
Payer: MEDICARE

## 2020-05-14 DIAGNOSIS — Z00.00 ENCOUNTER FOR GENERAL ADULT MEDICAL EXAMINATION WITHOUT ABNORMAL FINDINGS: ICD-10-CM

## 2020-05-14 DIAGNOSIS — Z90.2 ACQUIRED ABSENCE OF LUNG [PART OF]: Chronic | ICD-10-CM

## 2020-05-14 DIAGNOSIS — I82.409 ACUTE EMBOLISM AND THROMBOSIS OF UNSPECIFIED DEEP VEINS OF UNSPECIFIED LOWER EXTREMITY: ICD-10-CM

## 2020-05-14 DIAGNOSIS — Z98.890 OTHER SPECIFIED POSTPROCEDURAL STATES: Chronic | ICD-10-CM

## 2020-05-14 DIAGNOSIS — Z98.49 CATARACT EXTRACTION STATUS, UNSPECIFIED EYE: Chronic | ICD-10-CM

## 2020-05-14 PROCEDURE — 93970 EXTREMITY STUDY: CPT

## 2020-05-14 PROCEDURE — 93970 EXTREMITY STUDY: CPT | Mod: 26

## 2020-06-02 ENCOUNTER — APPOINTMENT (OUTPATIENT)
Dept: CT IMAGING | Facility: IMAGING CENTER | Age: 85
End: 2020-06-02

## 2020-06-19 ENCOUNTER — INPATIENT (INPATIENT)
Facility: HOSPITAL | Age: 85
LOS: 3 days | Discharge: NURSING, REHAB OR LONG-TERM CARE | DRG: 605 | End: 2020-06-23
Attending: INTERNAL MEDICINE | Admitting: INTERNAL MEDICINE
Payer: MEDICARE

## 2020-06-19 VITALS
DIASTOLIC BLOOD PRESSURE: 69 MMHG | RESPIRATION RATE: 16 BRPM | HEART RATE: 71 BPM | SYSTOLIC BLOOD PRESSURE: 110 MMHG | TEMPERATURE: 98 F | HEIGHT: 65 IN | WEIGHT: 138.01 LBS | OXYGEN SATURATION: 99 %

## 2020-06-19 DIAGNOSIS — Z98.890 OTHER SPECIFIED POSTPROCEDURAL STATES: Chronic | ICD-10-CM

## 2020-06-19 DIAGNOSIS — Z98.49 CATARACT EXTRACTION STATUS, UNSPECIFIED EYE: Chronic | ICD-10-CM

## 2020-06-19 DIAGNOSIS — Z90.2 ACQUIRED ABSENCE OF LUNG [PART OF]: Chronic | ICD-10-CM

## 2020-06-19 DIAGNOSIS — S01.01XA LACERATION WITHOUT FOREIGN BODY OF SCALP, INITIAL ENCOUNTER: ICD-10-CM

## 2020-06-19 LAB
ALBUMIN SERPL ELPH-MCNC: 4 G/DL — SIGNIFICANT CHANGE UP (ref 3.3–5)
ALP SERPL-CCNC: 171 U/L — HIGH (ref 40–120)
ALT FLD-CCNC: 16 U/L — SIGNIFICANT CHANGE UP (ref 10–45)
ANION GAP SERPL CALC-SCNC: 9 MMOL/L — SIGNIFICANT CHANGE UP (ref 5–17)
APPEARANCE UR: CLEAR — SIGNIFICANT CHANGE UP
AST SERPL-CCNC: 16 U/L — SIGNIFICANT CHANGE UP (ref 10–40)
BASOPHILS # BLD AUTO: 0.03 K/UL — SIGNIFICANT CHANGE UP (ref 0–0.2)
BASOPHILS NFR BLD AUTO: 0.4 % — SIGNIFICANT CHANGE UP (ref 0–2)
BILIRUB SERPL-MCNC: 0.4 MG/DL — SIGNIFICANT CHANGE UP (ref 0.2–1.2)
BILIRUB UR-MCNC: NEGATIVE — SIGNIFICANT CHANGE UP
BUN SERPL-MCNC: 15 MG/DL — SIGNIFICANT CHANGE UP (ref 7–23)
CALCIUM SERPL-MCNC: 9.5 MG/DL — SIGNIFICANT CHANGE UP (ref 8.4–10.5)
CHLORIDE SERPL-SCNC: 103 MMOL/L — SIGNIFICANT CHANGE UP (ref 96–108)
CO2 SERPL-SCNC: 25 MMOL/L — SIGNIFICANT CHANGE UP (ref 22–31)
COLOR SPEC: SIGNIFICANT CHANGE UP
CREAT SERPL-MCNC: 0.62 MG/DL — SIGNIFICANT CHANGE UP (ref 0.5–1.3)
DIFF PNL FLD: NEGATIVE — SIGNIFICANT CHANGE UP
EOSINOPHIL # BLD AUTO: 0.16 K/UL — SIGNIFICANT CHANGE UP (ref 0–0.5)
EOSINOPHIL NFR BLD AUTO: 2.3 % — SIGNIFICANT CHANGE UP (ref 0–6)
GLUCOSE SERPL-MCNC: 79 MG/DL — SIGNIFICANT CHANGE UP (ref 70–99)
GLUCOSE UR QL: NEGATIVE — SIGNIFICANT CHANGE UP
HCT VFR BLD CALC: 38 % — SIGNIFICANT CHANGE UP (ref 34.5–45)
HGB BLD-MCNC: 12.1 G/DL — SIGNIFICANT CHANGE UP (ref 11.5–15.5)
IMM GRANULOCYTES NFR BLD AUTO: 0.3 % — SIGNIFICANT CHANGE UP (ref 0–1.5)
KETONES UR-MCNC: NEGATIVE — SIGNIFICANT CHANGE UP
LEUKOCYTE ESTERASE UR-ACNC: NEGATIVE — SIGNIFICANT CHANGE UP
LYMPHOCYTES # BLD AUTO: 1.51 K/UL — SIGNIFICANT CHANGE UP (ref 1–3.3)
LYMPHOCYTES # BLD AUTO: 22.1 % — SIGNIFICANT CHANGE UP (ref 13–44)
MCHC RBC-ENTMCNC: 31.8 GM/DL — LOW (ref 32–36)
MCHC RBC-ENTMCNC: 32.3 PG — SIGNIFICANT CHANGE UP (ref 27–34)
MCV RBC AUTO: 101.3 FL — HIGH (ref 80–100)
MONOCYTES # BLD AUTO: 0.71 K/UL — SIGNIFICANT CHANGE UP (ref 0–0.9)
MONOCYTES NFR BLD AUTO: 10.4 % — SIGNIFICANT CHANGE UP (ref 2–14)
NEUTROPHILS # BLD AUTO: 4.41 K/UL — SIGNIFICANT CHANGE UP (ref 1.8–7.4)
NEUTROPHILS NFR BLD AUTO: 64.5 % — SIGNIFICANT CHANGE UP (ref 43–77)
NITRITE UR-MCNC: NEGATIVE — SIGNIFICANT CHANGE UP
NRBC # BLD: 0 /100 WBCS — SIGNIFICANT CHANGE UP (ref 0–0)
PH UR: 6 — SIGNIFICANT CHANGE UP (ref 5–8)
PLATELET # BLD AUTO: 222 K/UL — SIGNIFICANT CHANGE UP (ref 150–400)
POTASSIUM SERPL-MCNC: 4.3 MMOL/L — SIGNIFICANT CHANGE UP (ref 3.5–5.3)
POTASSIUM SERPL-SCNC: 4.3 MMOL/L — SIGNIFICANT CHANGE UP (ref 3.5–5.3)
PROT SERPL-MCNC: 6.8 G/DL — SIGNIFICANT CHANGE UP (ref 6–8.3)
PROT UR-MCNC: NEGATIVE — SIGNIFICANT CHANGE UP
RBC # BLD: 3.75 M/UL — LOW (ref 3.8–5.2)
RBC # FLD: 13.8 % — SIGNIFICANT CHANGE UP (ref 10.3–14.5)
SARS-COV-2 RNA SPEC QL NAA+PROBE: SIGNIFICANT CHANGE UP
SODIUM SERPL-SCNC: 137 MMOL/L — SIGNIFICANT CHANGE UP (ref 135–145)
SP GR SPEC: 1.01 — LOW (ref 1.01–1.02)
UROBILINOGEN FLD QL: NEGATIVE — SIGNIFICANT CHANGE UP
WBC # BLD: 6.84 K/UL — SIGNIFICANT CHANGE UP (ref 3.8–10.5)
WBC # FLD AUTO: 6.84 K/UL — SIGNIFICANT CHANGE UP (ref 3.8–10.5)

## 2020-06-19 PROCEDURE — 73060 X-RAY EXAM OF HUMERUS: CPT | Mod: 26,RT

## 2020-06-19 PROCEDURE — 72170 X-RAY EXAM OF PELVIS: CPT | Mod: 26

## 2020-06-19 PROCEDURE — 71045 X-RAY EXAM CHEST 1 VIEW: CPT | Mod: 26

## 2020-06-19 PROCEDURE — 73030 X-RAY EXAM OF SHOULDER: CPT | Mod: 26,RT

## 2020-06-19 PROCEDURE — 70450 CT HEAD/BRAIN W/O DYE: CPT | Mod: 26

## 2020-06-19 PROCEDURE — 99285 EMERGENCY DEPT VISIT HI MDM: CPT

## 2020-06-19 RX ORDER — ACETAMINOPHEN 500 MG
650 TABLET ORAL ONCE
Refills: 0 | Status: DISCONTINUED | OUTPATIENT
Start: 2020-06-19 | End: 2020-06-19

## 2020-06-19 RX ORDER — ACETAMINOPHEN 500 MG
650 TABLET ORAL EVERY 6 HOURS
Refills: 0 | Status: DISCONTINUED | OUTPATIENT
Start: 2020-06-19 | End: 2020-06-23

## 2020-06-19 RX ORDER — SENNA PLUS 8.6 MG/1
2 TABLET ORAL AT BEDTIME
Refills: 0 | Status: DISCONTINUED | OUTPATIENT
Start: 2020-06-19 | End: 2020-06-23

## 2020-06-19 RX ORDER — FUROSEMIDE 40 MG
20 TABLET ORAL DAILY
Refills: 0 | Status: DISCONTINUED | OUTPATIENT
Start: 2020-06-19 | End: 2020-06-23

## 2020-06-19 RX ORDER — ACETAMINOPHEN 500 MG
650 TABLET ORAL ONCE
Refills: 0 | Status: COMPLETED | OUTPATIENT
Start: 2020-06-19 | End: 2020-06-19

## 2020-06-19 RX ORDER — AMLODIPINE BESYLATE 2.5 MG/1
2.5 TABLET ORAL DAILY
Refills: 0 | Status: DISCONTINUED | OUTPATIENT
Start: 2020-06-19 | End: 2020-06-23

## 2020-06-19 RX ORDER — OXYCODONE AND ACETAMINOPHEN 5; 325 MG/1; MG/1
1 TABLET ORAL EVERY 6 HOURS
Refills: 0 | Status: DISCONTINUED | OUTPATIENT
Start: 2020-06-19 | End: 2020-06-23

## 2020-06-19 RX ORDER — TETANUS TOXOID, REDUCED DIPHTHERIA TOXOID AND ACELLULAR PERTUSSIS VACCINE, ADSORBED 5; 2.5; 8; 8; 2.5 [IU]/.5ML; [IU]/.5ML; UG/.5ML; UG/.5ML; UG/.5ML
0.5 SUSPENSION INTRAMUSCULAR ONCE
Refills: 0 | Status: COMPLETED | OUTPATIENT
Start: 2020-06-19 | End: 2020-06-19

## 2020-06-19 RX ORDER — AMLODIPINE BESYLATE 2.5 MG/1
1 TABLET ORAL
Qty: 0 | Refills: 0 | DISCHARGE

## 2020-06-19 RX ORDER — LOSARTAN POTASSIUM 100 MG/1
1 TABLET, FILM COATED ORAL
Qty: 0 | Refills: 0 | DISCHARGE

## 2020-06-19 RX ORDER — CHOLECALCIFEROL (VITAMIN D3) 125 MCG
1000 CAPSULE ORAL
Refills: 0 | Status: DISCONTINUED | OUTPATIENT
Start: 2020-06-19 | End: 2020-06-23

## 2020-06-19 RX ORDER — ATORVASTATIN CALCIUM 80 MG/1
40 TABLET, FILM COATED ORAL AT BEDTIME
Refills: 0 | Status: DISCONTINUED | OUTPATIENT
Start: 2020-06-19 | End: 2020-06-23

## 2020-06-19 RX ORDER — ASPIRIN/CALCIUM CARB/MAGNESIUM 324 MG
81 TABLET ORAL DAILY
Refills: 0 | Status: DISCONTINUED | OUTPATIENT
Start: 2020-06-19 | End: 2020-06-23

## 2020-06-19 RX ADMIN — ATORVASTATIN CALCIUM 40 MILLIGRAM(S): 80 TABLET, FILM COATED ORAL at 22:19

## 2020-06-19 RX ADMIN — Medication 650 MILLIGRAM(S): at 14:59

## 2020-06-19 RX ADMIN — TETANUS TOXOID, REDUCED DIPHTHERIA TOXOID AND ACELLULAR PERTUSSIS VACCINE, ADSORBED 0.5 MILLILITER(S): 5; 2.5; 8; 8; 2.5 SUSPENSION INTRAMUSCULAR at 13:50

## 2020-06-19 NOTE — ED ADULT NURSE NOTE - OBJECTIVE STATEMENT
The patient is a 87y Female complaining of fall. 86yo female dementia, recent L hip surgery and discharged from rehab 5 days ago p/w R shoulder pain and R scalp lac s/p fall last night. Patient poor historian and says she is here "for a checkup". Patient denies neck pain, hip pain, urinary complaints, fever, headache. Rest of history gathered from son Feliz. Patient lives with her  and both use walkers and both of them got intertwined and caused her to fall back onto her R side around 9pm last night. Patient was able to get up after and no LOC. No aide at home, son stops by a couple times per week.

## 2020-06-19 NOTE — ED ADULT NURSE NOTE - NSIMPLEMENTINTERV_GEN_ALL_ED
Implemented All Fall Risk Interventions:  [place] to call system. Call bell, personal items and telephone within reach. Instruct patient to call for assistance. Room bathroom lighting operational. Non-slip footwear when patient is off stretcher. Physically safe environment: no spills, clutter or unnecessary equipment. Stretcher in lowest position, wheels locked, appropriate side rails in place. Provide visual cue, wrist band, yellow gown, etc. Monitor gait and stability. Monitor for mental status changes and reorient to person, place, and time. Review medications for side effects contributing to fall risk. Reinforce activity limits and safety measures with patient and family.

## 2020-06-19 NOTE — ED PROVIDER NOTE - ATTENDING CONTRIBUTION TO CARE
87 F w/ pmh of dementia presents to the ED s/p mechanical fall that happened yesterday. pt was ambulating in the apartment, and fell 2/2 walkers of pt and her husbands walker getting intertwined. pt hit her head on the R scalp frontal area. Pt has a hemostatic laceration w/ no active bleeding. strength equal in lower extremities but has R shoulder pain suspect muscle contusion, pt is unable to use her walker 2/2 R shoulder pain, will have admisson for NAVYA

## 2020-06-19 NOTE — ED PROVIDER NOTE - OBJECTIVE STATEMENT
86yo female dementia, recent L hip surgery and discharged from rehab 5 days ago p/w R shoulder pain and R scalp lac s/p fall last night. Patient poor historian and says she is here "for a checkup". Patient denies neck pain, hip pain, urinary complaints, fever, headache. Rest of history gathered from son Feliz. Patient lives with her  and both use walkers and both of them got intertwined and caused her to fall back onto her R side around 9pm last night. Patient was able to get up after and no LOC. No aide at home, son stops by a couple times per week.

## 2020-06-19 NOTE — H&P ADULT - ASSESSMENT
Left arm pain Left arm pain  Patient cannot lift arm to use walker safely as evaluated by the ER staff  Will need aggressive PT in order to get functionality  back in the affected arm  Patient is a fall risk and there is no one at home who can help maintain a safe environment as her  is frail and elderly and is not able to help her and her son is available only several times per week

## 2020-06-19 NOTE — H&P ADULT - HISTORY OF PRESENT ILLNESS
86yo female dementia, recent L hip surgery and discharged from rehab 5 days ago p/w R shoulder pain and R scalp lac s/p fall last night. Patient poor historian and says she is here "for a checkup". Patient denies neck pain, hip pain, urinary complaints, fever, headache. Rest of history gathered from son Feliz. Patient lives with her  and both use walkers and both of them got intertwined and caused her to fall back onto her R side around 9pm last night. Patient was able to get up after and no LOC. No aide at home, son stops by a couple times per week.  Because of te injury to the right arm she is unable to use her walker and is unable to walk.   is frail and is  unable to help her or help  with physical therapy.

## 2020-06-19 NOTE — ED PROVIDER NOTE - CARE PLAN
Principal Discharge DX:	Scalp laceration, initial encounter  Secondary Diagnosis:	Shoulder pain, right

## 2020-06-19 NOTE — ED ADULT NURSE NOTE - BREATHING, MLM
Left message stating all recent lab work was normal, if they have any questions they can call the office back.     Spontaneous, unlabored and symmetrical

## 2020-06-19 NOTE — H&P ADULT - NSHPREVIEWOFSYSTEMS_GEN_ALL_CORE
REVIEW OF SYSTEM:  CONSTITUTIONAL: No fever, No change in weight, No fatigue  HEAD: No headache, No dizziness, No recent trauma  EYES: No eye pain, No visual disturbances, No discharge  ENT:  No difficulty hearing, No tinnitus, No vertigo, No sinus pain, No throat pain  NECK: No pain, No stiffness  BREASTS: No pain, No masses, No nipple discharge  RESPIRATORY: No cough, No wheezing, No chills, No hemoptysis, No shortness of breath at rest or exertional shortness of breath  CARDIOVASCULAR: No chest pain, No palpitations, No dizziness, No CHF, No arrhythmia, No cardiomegaly, No leg swelling  GASTROINTESTINAL: No abdominal, No epigastric pain. No nausea, No vomiting, No hematemesis, No diarrhea, No constipation. No melena, No hematochezia. No GERD  GENITOURINARY: No dysuria, No frequency, No hematuria, No incontinence, No nocturia, No hesitancy,  SKIN: No itching, No burning, No rashes, No lesions   LYMPH NODES: No history of enlarged glands  ENDOCRINE: No heat or cold intolerance, No hair loss. No osteoporosis, No thyroid disease  MUSCULOSKELETAL: No joint pain or swelling, No muscle, back, pain    (+) extremity pain right arm cannot move the arm because of pain  PSYCHIATRIC: No depression, No anxiety, No mood swings, No difficulty sleeping  HEME/LYMPH: No easy bruising, No anticoagulants, No bleeding disorder, No bleeding gums  ALLERGY AND IMMUNOLOGIC: No hives, No eczema  NEUROLOGICAL: (+)  memory loss, No loss of strength, No numbness, No tremors

## 2020-06-19 NOTE — H&P ADULT - NSHPPHYSICALEXAM_GEN_ALL_CORE
· Physical Examination: Physical Exam:  	Gen: NAD, non-toxic appearing  	Head: 3 cm hemostatic scalp lac to R side cleaned without foreign body not open covered with bacitracin, FROM neck, no midline TTP   	HEENT: EOMI, PEERLA, normal conjunctiva, tongue midline, oral mucosa moist  	Lung: CTAB, no respiratory distress, no wheezes/rhonchi/rales B/L, speaking in full sentences  	CV: RRR, no murmurs, rubs or gallops, distal pulses 2+ b/l  	Abd: soft, NT, ND, no guarding, no rigidity, no rebound tenderness, no CVA tenderness  	MSK: able to abduct  R shoulder, does not appear grossly dislocated, mild TTP of superior humerus, no visible deformities, no back TTP  	Neuro: 5/5 strength b/l, CN II-XII intact, no focal sensory or motor deficits  	Skin: Warm, well perfused, no rash, no leg swelling  Psych: normal affect, calm

## 2020-06-19 NOTE — ED PROVIDER NOTE - CLINICAL SUMMARY MEDICAL DECISION MAKING FREE TEXT BOX
88 yo female dementia ambulates with walker p/w hemostatic scalp lac and R shoulder pain s/p mechanical fall. Able to abduct R shoulder. r/o fx, unlikely to be dislocated. Scalp hemostatic and > 16 hours from injury, unlikely to need staples. Concern for patient's ability to care for herself and lack of help at home and only recently left rehab. will likely need admission for [facility] placement unless PT clears with walker here in department and no acute injuries.

## 2020-06-19 NOTE — ED ADULT NURSE NOTE - CHIEF COMPLAINT QUOTE
Pt has Hx of multiple falls in the last few months, She was treated at [facility][1] for a fall in April and discharged. Pt fell today and hit back of her head, Pt denies LOC.

## 2020-06-19 NOTE — ED ADULT TRIAGE NOTE - CHIEF COMPLAINT QUOTE
Pt has Hx of multiple falls in the last few months, She was treated at [facility] for a fall in April and discharged. Pt fell today and hit back of her head, Pt denies LOC.

## 2020-06-19 NOTE — ED PROVIDER NOTE - PHYSICAL EXAMINATION
Physical Exam:  Gen: NAD, non-toxic appearing  Head: 3 cm hemostatic scalp lac to R side, FROM neck, no midline TTP   HEENT: EOMI, PEERLA, normal conjunctiva, tongue midline, oral mucosa moist  Lung: CTAB, no respiratory distress, no wheezes/rhonchi/rales B/L, speaking in full sentences  CV: RRR, no murmurs, rubs or gallops, distal pulses 2+ b/l  Abd: soft, NT, ND, no guarding, no rigidity, no rebound tenderness, no CVA tenderness   MSK: able to abduct  R shoulder, does not appear grossly dislocated, mild TTP of superior humerus, no visible deformities, no back TTP  Neuro: 5/5 strength b/l, CN II-XII intact, no focal sensory or motor deficits  Skin: Warm, well perfused, no rash, no leg swelling  Psych: normal affect, calm Physical Exam:  Gen: NAD, non-toxic appearing  Head: 3 cm hemostatic scalp lac to R side cleaned without foreign body not open covered with bacitracin, FROM neck, no midline TTP   HEENT: EOMI, PEERLA, normal conjunctiva, tongue midline, oral mucosa moist  Lung: CTAB, no respiratory distress, no wheezes/rhonchi/rales B/L, speaking in full sentences  CV: RRR, no murmurs, rubs or gallops, distal pulses 2+ b/l  Abd: soft, NT, ND, no guarding, no rigidity, no rebound tenderness, no CVA tenderness  MSK: able to abduct  R shoulder, does not appear grossly dislocated, mild TTP of superior humerus, no visible deformities, no back TTP  Neuro: 5/5 strength b/l, CN II-XII intact, no focal sensory or motor deficits  Skin: Warm, well perfused, no rash, no leg swelling  Psych: normal affect, calm

## 2020-06-19 NOTE — H&P ADULT - NSHPLABSRESULTS_GEN_ALL_CORE
CXR NAD          CBC Full  -  ( 2020 14:35 )  WBC Count : 6.84 K/uL  RBC Count : 3.75 M/uL  Hemoglobin : 12.1 g/dL  Hematocrit : 38.0 %  Platelet Count - Automated : 222 K/uL  Mean Cell Volume : 101.3 fl  Mean Cell Hemoglobin : 32.3 pg  Mean Cell Hemoglobin Concentration : 31.8 gm/dL  Auto Neutrophil # : 4.41 K/uL  Auto Lymphocyte # : 1.51 K/uL  Auto Monocyte # : 0.71 K/uL  Auto Eosinophil # : 0.16 K/uL  Auto Basophil # : 0.03 K/uL  Auto Neutrophil % : 64.5 %  Auto Lymphocyte % : 22.1 %  Auto Monocyte % : 10.4 %  Auto Eosinophil % : 2.3 %  Auto Basophil % : 0.4 %        137  |  103  |  15  ----------------------------<  79  4.3   |  25  |  0.62    Ca    9.5      2020 14:35    TPro  6.8  /  Alb  4.0  /  TBili  0.4  /  DBili  x   /  AST  16  /  ALT  16  /  AlkPhos  171<H>  19    LIVER FUNCTIONS - ( 2020 14:35 )  Alb: 4.0 g/dL / Pro: 6.8 g/dL / ALK PHOS: 171 U/L / ALT: 16 U/L / AST: 16 U/L / GGT: x             Urinalysis Basic - ( 2020 15:30 )    Color: Light Yellow / Appearance: Clear / S.008 / pH: x  Gluc: x / Ketone: Negative  / Bili: Negative / Urobili: Negative   Blood: x / Protein: Negative / Nitrite: Negative   Leuk Esterase: Negative / RBC: x / WBC x   Sq Epi: x / Non Sq Epi: x / Bacteria: x

## 2020-06-20 DIAGNOSIS — S01.01XA LACERATION WITHOUT FOREIGN BODY OF SCALP, INITIAL ENCOUNTER: ICD-10-CM

## 2020-06-20 DIAGNOSIS — M25.511 PAIN IN RIGHT SHOULDER: ICD-10-CM

## 2020-06-20 DIAGNOSIS — E78.5 HYPERLIPIDEMIA, UNSPECIFIED: ICD-10-CM

## 2020-06-20 DIAGNOSIS — C34.90 MALIGNANT NEOPLASM OF UNSPECIFIED PART OF UNSPECIFIED BRONCHUS OR LUNG: ICD-10-CM

## 2020-06-20 DIAGNOSIS — F03.90 UNSPECIFIED DEMENTIA WITHOUT BEHAVIORAL DISTURBANCE: ICD-10-CM

## 2020-06-20 DIAGNOSIS — I10 ESSENTIAL (PRIMARY) HYPERTENSION: ICD-10-CM

## 2020-06-20 LAB
ALBUMIN SERPL ELPH-MCNC: 3.8 G/DL — SIGNIFICANT CHANGE UP (ref 3.3–5)
ALP SERPL-CCNC: 158 U/L — HIGH (ref 40–120)
ALT FLD-CCNC: 11 U/L — SIGNIFICANT CHANGE UP (ref 10–45)
ANION GAP SERPL CALC-SCNC: 12 MMOL/L — SIGNIFICANT CHANGE UP (ref 5–17)
AST SERPL-CCNC: 14 U/L — SIGNIFICANT CHANGE UP (ref 10–40)
BASOPHILS # BLD AUTO: 0.03 K/UL — SIGNIFICANT CHANGE UP (ref 0–0.2)
BASOPHILS NFR BLD AUTO: 0.5 % — SIGNIFICANT CHANGE UP (ref 0–2)
BILIRUB SERPL-MCNC: 0.5 MG/DL — SIGNIFICANT CHANGE UP (ref 0.2–1.2)
BUN SERPL-MCNC: 11 MG/DL — SIGNIFICANT CHANGE UP (ref 7–23)
CALCIUM SERPL-MCNC: 10 MG/DL — SIGNIFICANT CHANGE UP (ref 8.4–10.5)
CHLORIDE SERPL-SCNC: 105 MMOL/L — SIGNIFICANT CHANGE UP (ref 96–108)
CO2 SERPL-SCNC: 25 MMOL/L — SIGNIFICANT CHANGE UP (ref 22–31)
CREAT SERPL-MCNC: 0.62 MG/DL — SIGNIFICANT CHANGE UP (ref 0.5–1.3)
CULTURE RESULTS: SIGNIFICANT CHANGE UP
EOSINOPHIL # BLD AUTO: 0.22 K/UL — SIGNIFICANT CHANGE UP (ref 0–0.5)
EOSINOPHIL NFR BLD AUTO: 3.7 % — SIGNIFICANT CHANGE UP (ref 0–6)
GLUCOSE SERPL-MCNC: 80 MG/DL — SIGNIFICANT CHANGE UP (ref 70–99)
HCT VFR BLD CALC: 37.9 % — SIGNIFICANT CHANGE UP (ref 34.5–45)
HGB BLD-MCNC: 12.1 G/DL — SIGNIFICANT CHANGE UP (ref 11.5–15.5)
IMM GRANULOCYTES NFR BLD AUTO: 0.2 % — SIGNIFICANT CHANGE UP (ref 0–1.5)
LYMPHOCYTES # BLD AUTO: 1.35 K/UL — SIGNIFICANT CHANGE UP (ref 1–3.3)
LYMPHOCYTES # BLD AUTO: 22.9 % — SIGNIFICANT CHANGE UP (ref 13–44)
MCHC RBC-ENTMCNC: 31.9 GM/DL — LOW (ref 32–36)
MCHC RBC-ENTMCNC: 32.4 PG — SIGNIFICANT CHANGE UP (ref 27–34)
MCV RBC AUTO: 101.6 FL — HIGH (ref 80–100)
MONOCYTES # BLD AUTO: 0.51 K/UL — SIGNIFICANT CHANGE UP (ref 0–0.9)
MONOCYTES NFR BLD AUTO: 8.7 % — SIGNIFICANT CHANGE UP (ref 2–14)
NEUTROPHILS # BLD AUTO: 3.77 K/UL — SIGNIFICANT CHANGE UP (ref 1.8–7.4)
NEUTROPHILS NFR BLD AUTO: 64 % — SIGNIFICANT CHANGE UP (ref 43–77)
NRBC # BLD: 0 /100 WBCS — SIGNIFICANT CHANGE UP (ref 0–0)
PLATELET # BLD AUTO: 209 K/UL — SIGNIFICANT CHANGE UP (ref 150–400)
POTASSIUM SERPL-MCNC: 3.8 MMOL/L — SIGNIFICANT CHANGE UP (ref 3.5–5.3)
POTASSIUM SERPL-SCNC: 3.8 MMOL/L — SIGNIFICANT CHANGE UP (ref 3.5–5.3)
PROT SERPL-MCNC: 6.2 G/DL — SIGNIFICANT CHANGE UP (ref 6–8.3)
RBC # BLD: 3.73 M/UL — LOW (ref 3.8–5.2)
RBC # FLD: 13.8 % — SIGNIFICANT CHANGE UP (ref 10.3–14.5)
SARS-COV-2 IGG SERPL QL IA: NEGATIVE — SIGNIFICANT CHANGE UP
SARS-COV-2 IGM SERPL IA-ACNC: <0.1 INDEX — SIGNIFICANT CHANGE UP
SODIUM SERPL-SCNC: 142 MMOL/L — SIGNIFICANT CHANGE UP (ref 135–145)
SPECIMEN SOURCE: SIGNIFICANT CHANGE UP
WBC # BLD: 5.89 K/UL — SIGNIFICANT CHANGE UP (ref 3.8–10.5)
WBC # FLD AUTO: 5.89 K/UL — SIGNIFICANT CHANGE UP (ref 3.8–10.5)

## 2020-06-20 RX ADMIN — AMLODIPINE BESYLATE 2.5 MILLIGRAM(S): 2.5 TABLET ORAL at 05:55

## 2020-06-20 RX ADMIN — Medication 81 MILLIGRAM(S): at 11:22

## 2020-06-20 RX ADMIN — ATORVASTATIN CALCIUM 40 MILLIGRAM(S): 80 TABLET, FILM COATED ORAL at 21:50

## 2020-06-20 RX ADMIN — Medication 20 MILLIGRAM(S): at 05:56

## 2020-06-20 RX ADMIN — Medication 1000 UNIT(S): at 05:55

## 2020-06-20 RX ADMIN — SENNA PLUS 2 TABLET(S): 8.6 TABLET ORAL at 21:50

## 2020-06-20 RX ADMIN — Medication 1000 UNIT(S): at 17:13

## 2020-06-20 NOTE — PHYSICAL THERAPY INITIAL EVALUATION ADULT - GENERAL OBSERVATIONS, REHAB EVAL
Pt is A&Ox2 (self, place- hospital), unable to state date/year; awake and pleasantly confused, cooperative. Pt moving all extremities in supine, c/o pain RUE, able to place weight through RUE/hold walker, squeeze PT hand

## 2020-06-20 NOTE — PHYSICAL THERAPY INITIAL EVALUATION ADULT - PLANNED THERAPY INTERVENTIONS, PT EVAL
balance training/gait training/transfer training/stair neg: GOAL: pt will neg 9 steps 1 HR, step to pattern with sup in 2wks./bed mobility training/strengthening

## 2020-06-20 NOTE — PROGRESS NOTE ADULT - SUBJECTIVE AND OBJECTIVE BOX
Patient is a 87y old  Female who presents with a chief complaint of right arm pain makes it hard for her o use her walker to ambulate (19 Jun 2020 20:46)      HPI:  Still with difficulty moving the right arm .  Will ask ortho to evaluate    MEDICATIONS  (STANDING):  amLODIPine   Tablet 2.5 milliGRAM(s) Oral daily  aspirin enteric coated 81 milliGRAM(s) Oral daily  atorvastatin 40 milliGRAM(s) Oral at bedtime  cholecalciferol 1000 Unit(s) Oral two times a day  furosemide    Tablet 20 milliGRAM(s) Oral daily  senna 2 Tablet(s) Oral at bedtime    MEDICATIONS  (PRN):  acetaminophen   Tablet .. 650 milliGRAM(s) Oral every 6 hours PRN Mild Pain (1 - 3)  oxycodone    5 mG/acetaminophen 325 mG 1 Tablet(s) Oral every 6 hours PRN Moderate Pain (4 - 6)      Allergies    No Known Allergies    Intolerances        REVIEW OF SYSTEM:  CONSTITUTIONAL: No fever, No change in weight, No fatigue      vital signs:  137/66  P66  RR 18  93% O2 st        PHYSICAL EXAM:  GENERAL: NAD, well nourished and conversant  HEAD:  Atraumatic  EYES: EOM, PERRLA, conjunctiva pink and sclera white  ENT: No tonsillar erythema, exudates, or enlargement, moist mucous membranes, good dentition, no lesions  NECK: Supple, No JVD, normal thyroid, carotids with normal upstrokes and no bruits  CHEST/LUNG: Clear to auscultation bilaterally, No rales, rhonchi, wheezing, or rubs  HEART: Regular rate and rhythm, No murmurs, rubs, or gallops  ABDOMEN: Soft, nondistended, no masses, guarding, tenderness or rebound, bowel sounds present  EXTREMITIES:  2+ Peripheral Pulses, No clubbing, cyanosis, or edema. No arthritis of shoulders, elbows, hands, hips, knees, ankles, or feet. No DJD C spine, T spine, or L/S spine  pain iand immobility right arm  will ask ortho to evaluate  LYMPH: No lymphadenopathy noted  SKIN: No rashes or lesions  NERVOUS SYSTEM:  Alert & Oriented X3, normal cognitive function. Motor Strength 5/5 right upper and right lower.  5/5 left upper and left lower extremities, DTRs 2+ intact and symmetric    LABS:                          12.1   5.89  )-----------( 209      ( 20 Jun 2020 06:51 )             37.9     06-20    142  |  105  |  11  ----------------------------<  80  3.8   |  25  |  0.62  06-19    137  |  103  |  15  ----------------------------<  79  4.3   |  25  |  0.62    Ca    10.0      20 Jun 2020 06:51  Ca    9.5      19 Jun 2020 14:35    TPro  6.2  /  Alb  3.8  /  TBili  0.5  /  DBili  x   /  AST  14  /  ALT  11  /  AlkPhos  158<H>  06-20  TPro  6.8  /  Alb  4.0  /  TBili  0.4  /  DBili  x   /  AST  16  /  ALT  16  /  AlkPhos  171<H>  06-19    CAPILLARY BLOOD GLUCOSE          RADIOLOGY & ADDITIONAL TESTS:      Consultant(s):    Care Discussed with Consultants/Other Providers [ ] YES  [ ] NO

## 2020-06-20 NOTE — PHYSICAL THERAPY INITIAL EVALUATION ADULT - PERTINENT HX OF CURRENT PROBLEM, REHAB EVAL
Pt is a 86yo female admitted to [facility] on 6/19/20 hx dementia, recent L hip surgery and discharged from rehab 5 days ago p/w R shoulder pain and R scalp lac s/p fall last night. Patient poor historian and says she is here "for a checkup". Patient denies neck pain, hip pain, urinary complaints, fever, headache.

## 2020-06-20 NOTE — PHYSICAL THERAPY INITIAL EVALUATION ADULT - ASR WT BEARING STATUS EVAL
hx LLE ORIF 4/2020, L distal radius fx in 4/2020 since has been using rolling walker per notes/no weight-bearing restrictions

## 2020-06-20 NOTE — PHYSICAL THERAPY INITIAL EVALUATION ADULT - LIVES WITH, PROFILE
spouse Eye Protection Verbiage: Before proceeding with the stage, a plastic scleral shield was inserted. The globe was anesthetized with a few drops of 1% lidocaine with 1:100,000 epinephrine. Then, an appropriate sized scleral shield was chosen and coated with lacrilube ointment. The shield was gently inserted and left in place for the duration of each stage. After the stage was completed, the shield was gently removed.

## 2020-06-20 NOTE — PHYSICAL THERAPY INITIAL EVALUATION ADULT - PRECAUTIONS/LIMITATIONS, REHAB EVAL
Pt lives with her  and both use walkers and both of them got intertwined and caused her to fall back onto her R side around 9pm last night. Patient was able to get up after and no LOC. No aide at home, son stops by a couple times per week.  Because of te injury to the right arm she is unable to use her walker and is unable to walk.   is frail and is  unable to help her or help  with physical therapy. Hospital course: per ED notes, Able to abduct R shoulder. r/o fx, unlikely to be dislocated. Scalp hemostatic and > 16 hours from injury, unlikely to need staples. Concern for patient's ability to care for herself and lack of help at home and only recently left rehab. will likely need admission for [facility] placement unless PT clears with walker here in department and no acute injuries. 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 fall precautions/Pt lives with her  and both use walkers and both of them got intertwined and caused her to fall back onto her R side around 9pm last night. Patient was able to get up after and no LOC. No aide at home, son stops by a couple times per week.  Because of te injury to the right arm she is unable to use her walker and is unable to walk.   is frail and is  unable to help her or help  with physical therapy. Hospital course: per ED notes, Able to abduct R shoulder. r/o fx, unlikely to be dislocated. Scalp hemostatic and > 16 hours from injury, unlikely to need staples. Concern for patient's ability to care for herself and lack of help at home and only recently left rehab. will likely need admission for [facility] placement unless PT clears with walker here in department and no acute injuries. 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

## 2020-06-20 NOTE — PROGRESS NOTE ADULT - ASSESSMENT
niels admitted because she is unable to use her walker due to her right shoulder  injury  and she is too unsteady on her feet without her walker to be at home as there is noone there to hlpe her as her  is too frail and he himself uses a walker and their son isn availabel only 3 time per week  Socila services and Care coordination to get involved for a safe d/c  ?[facility] an option

## 2020-06-20 NOTE — PHYSICAL THERAPY INITIAL EVALUATION ADULT - DISCHARGE DISPOSITION, PT EVAL
DC plan subacute rehab; if to go home, home PT services, assist for ALL mobility/ADLs, recommend rolling walker, DC plan to be emailed, CM to be notified.

## 2020-06-20 NOTE — PHYSICAL THERAPY INITIAL EVALUATION ADULT - ADDITIONAL COMMENTS
pt lives at home with spouse, pt reports some steps (per last PT notes + 9 steps). Pt was recently in [facility] after last hospitalization and recently DCd home; prior to last hospitalization pt was independent with functional mobility but required assistance for ADLs. Pt did not use a AD for ambulation PTA. No HHA

## 2020-06-21 RX ADMIN — Medication 20 MILLIGRAM(S): at 05:29

## 2020-06-21 RX ADMIN — Medication 81 MILLIGRAM(S): at 12:45

## 2020-06-21 RX ADMIN — SENNA PLUS 2 TABLET(S): 8.6 TABLET ORAL at 21:35

## 2020-06-21 RX ADMIN — ATORVASTATIN CALCIUM 40 MILLIGRAM(S): 80 TABLET, FILM COATED ORAL at 21:35

## 2020-06-21 RX ADMIN — Medication 1000 UNIT(S): at 05:29

## 2020-06-21 RX ADMIN — AMLODIPINE BESYLATE 2.5 MILLIGRAM(S): 2.5 TABLET ORAL at 05:29

## 2020-06-21 RX ADMIN — Medication 1000 UNIT(S): at 17:37

## 2020-06-21 NOTE — PROGRESS NOTE ADULT - SUBJECTIVE AND OBJECTIVE BOX
Patient is a 87y old  Female who presents with a chief complaint of right arm pain makes it hard for her o use her walker to ambulate (19 Jun 2020 20:46)      HPI:  Still with difficulty moving the right arm .  Will ask ortho to evaluate  Jareth to have increased ROM in order to be able to use walker to ambulate    MEDICATIONS  (STANDING):  amLODIPine   Tablet 2.5 milliGRAM(s) Oral daily  aspirin enteric coated 81 milliGRAM(s) Oral daily  atorvastatin 40 milliGRAM(s) Oral at bedtime  cholecalciferol 1000 Unit(s) Oral two times a day  furosemide    Tablet 20 milliGRAM(s) Oral daily  senna 2 Tablet(s) Oral at bedtime    MEDICATIONS  (PRN):  acetaminophen   Tablet .. 650 milliGRAM(s) Oral every 6 hours PRN Mild Pain (1 - 3)  oxycodone    5 mG/acetaminophen 325 mG 1 Tablet(s) Oral every 6 hours PRN Moderate Pain (4 - 6)      Allergies    No Known Allergies    Intolerances    VSS    REVIEW OF SYSTEM:  CONSTITUTIONAL: No fever, No change in weight, No fatigu  PHYSICAL EXAM:  GENERAL: NAD, well nourished and conversant  HEAD:  Atraumatic  EYES: EOM, PERRLA, conjunctiva pink and sclera white  ENT: No tonsillar erythema, exudates, or enlargement, moist mucous membranes, good dentition, no lesions  NECK: Supple, No JVD, normal thyroid, carotids with normal upstrokes and no bruits  CHEST/LUNG: Clear to auscultation bilaterally, No rales, rhonchi, wheezing, or rubs  HEART: Regular rate and rhythm, No murmurs, rubs, or gallops  ABDOMEN: Soft, nondistended, no masses, guarding, tenderness or rebound, bowel sounds present  EXTREMITIES:  2+ Peripheral Pulses, No clubbing, cyanosis, or edema. No arthritis of shoulders, elbows, hands, hips, knees, ankles, or feet. No DJD C spine, T spine, or L/S spine  pain iand immobility right arm  will ask ortho to evaluate  LYMPH: No lymphadenopathy noted  SKIN: No rashes or lesions  NERVOUS SYSTEM:  Alert & Oriented X3, normal cognitive function. Motor Strength 5/5 right upper and right lower.  5/5 left upper and left lower extremities, DTRs 2+ intact and symmetric    LABS:                          12.1   5.89  )-----------( 209      ( 20 Jun 2020 06:51 )             37.9     06-20    142  |  105  |  11  ----------------------------<  80  3.8   |  25  |  0.62  06-19    137  |  103  |  15  ----------------------------<  79  4.3   |  25  |  0.62    Ca    10.0      20 Jun 2020 06:51  Ca    9.5      19 Jun 2020 14:35    TPro  6.2  /  Alb  3.8  /  TBili  0.5  /  DBili  x   /  AST  14  /  ALT  11  /  AlkPhos  158<H>  06-20  TPro  6.8  /  Alb  4.0  /  TBili  0.4  /  DBili  x   /  AST  16  /  ALT  16  /  AlkPhos  171<H>  06-19    CAPILLARY BLOOD GLUCOSE          RADIOLOGY & ADDITIONAL TESTS:      Consultant(s):    Care Discussed with Consultants/Other Providers [ ] YES  [ ] NO

## 2020-06-22 ENCOUNTER — TRANSCRIPTION ENCOUNTER (OUTPATIENT)
Age: 85
End: 2020-06-22

## 2020-06-22 RX ADMIN — Medication 1000 UNIT(S): at 05:23

## 2020-06-22 RX ADMIN — AMLODIPINE BESYLATE 2.5 MILLIGRAM(S): 2.5 TABLET ORAL at 05:22

## 2020-06-22 RX ADMIN — OXYCODONE AND ACETAMINOPHEN 1 TABLET(S): 5; 325 TABLET ORAL at 03:27

## 2020-06-22 RX ADMIN — OXYCODONE AND ACETAMINOPHEN 1 TABLET(S): 5; 325 TABLET ORAL at 20:04

## 2020-06-22 RX ADMIN — Medication 20 MILLIGRAM(S): at 05:22

## 2020-06-22 RX ADMIN — SENNA PLUS 2 TABLET(S): 8.6 TABLET ORAL at 22:13

## 2020-06-22 RX ADMIN — Medication 81 MILLIGRAM(S): at 11:52

## 2020-06-22 RX ADMIN — ATORVASTATIN CALCIUM 40 MILLIGRAM(S): 80 TABLET, FILM COATED ORAL at 22:13

## 2020-06-22 RX ADMIN — Medication 1000 UNIT(S): at 17:19

## 2020-06-22 NOTE — DISCHARGE NOTE PROVIDER - CARE PROVIDER_API CALL
Agustin Garcia  INTERNAL MEDICINE  [address] [place] [address]  [facility] [place] [address]  Phone: [phone]  Fax: [phone]  Follow Up Time:

## 2020-06-22 NOTE — DISCHARGE NOTE PROVIDER - NSDCMRMEDTOKEN_GEN_ALL_CORE_FT
amLODIPine 2.5 mg oral tablet: 1 tab(s) orally once a day  aspirin 81 mg oral tablet: 1 tab(s) orally once a day in am    Crestor 5 mg oral tablet: 1 tab(s) orally once a day (at bedtime)  Lasix 20 mg oral tablet: 1 tab(s) orally once a day  senna oral tablet: 2 tab(s) orally once a day (at bedtime)  Vitamin D3 1000 intl units oral capsule: 1 cap(s) orally 2 times a day amLODIPine 2.5 mg oral tablet: 1 tab(s) orally once a day  aspirin 81 mg oral tablet: 1 tab(s) orally once a day in am    atorvastatin 40 mg oral tablet: 1 tab(s) orally once a day (at bedtime)  Lasix 20 mg oral tablet: 1 tab(s) orally once a day  senna oral tablet: 2 tab(s) orally once a day (at bedtime)  Hold for loose stool   Vitamin D3 1000 intl units oral capsule: 1 cap(s) orally 2 times a day

## 2020-06-22 NOTE — PROGRESS NOTE ADULT - SUBJECTIVE AND OBJECTIVE BOX
86yo female dementia, recent L hip surgery and discharged from rehab 5 days ago p/w R shoulder pain and R scalp lac s/p fall last night. Patient poor historian and says she is here "for a checkup". Patient denies neck pain, hip pain, urinary complaints, fever, headache. Rest of history gathered from son Feliz. Patient lives with her  and both use walkers and both of them got intertwined and caused her to fall back onto her R side around 9pm last night. Patient was able to get up after and no LOC. No aide at home, son stops by a couple times per week. Patient seen OOB in chair. very pleasant but confused    MEDICATIONS  (STANDING):  amLODIPine   Tablet 2.5 milliGRAM(s) Oral daily  aspirin enteric coated 81 milliGRAM(s) Oral daily  atorvastatin 40 milliGRAM(s) Oral at bedtime  cholecalciferol 1000 Unit(s) Oral two times a day  furosemide    Tablet 20 milliGRAM(s) Oral daily  senna 2 Tablet(s) Oral at bedtime    MEDICATIONS  (PRN):  acetaminophen   Tablet .. 650 milliGRAM(s) Oral every 6 hours PRN Mild Pain (1 - 3)  oxycodone    5 mG/acetaminophen 325 mG 1 Tablet(s) Oral every 6 hours PRN Moderate Pain (4 - 6)          VITALS:   T(C): 36.7 (06-22-20 @ 13:15), Max: 36.7 (06-22-20 @ 03:25)  HR: 62 (06-22-20 @ 13:15) (59 - 76)  BP: 120/65 (06-22-20 @ 13:15) (118/65 - 153/78)  RR: 18 (06-22-20 @ 13:15) (18 - 18)  SpO2: 97% (06-22-20 @ 13:15) (95% - 97%)  Wt(kg): --    PHYSICAL EXAM:  GENERAL: NAD, well nourished and conversant  HEAD:  Atraumatic  EYES: EOM, PERRLA, conjunctiva pink and sclera white  ENT: No tonsillar erythema, exudates, or enlargement, moist mucous membranes, good dentition, no lesions  NECK: Supple, No JVD, normal thyroid, carotids with normal upstrokes and no bruits  CHEST/LUNG: Clear to auscultation bilaterally, No rales, rhonchi, wheezing, or rubs  HEART: Regular rate and rhythm, No murmurs, rubs, or gallops  ABDOMEN: Soft, nondistended, no masses, guarding, tenderness or rebound, bowel sounds present  EXTREMITIES:  2+ Peripheral Pulses, No clubbing, cyanosis, or edema.   LYMPH: No lymphadenopathy noted  SKIN: No rashes or lesions  NERVOUS SYSTEM:  Alert & Oriented X3, normal cognitive function. Motor Strength 5/5 right upper and right lower.  5/5 left upper and left lower extremities, DTRs 2+ intact and symmetric      LABS:                      CAPILLARY BLOOD GLUCOSE          RADIOLOGY & ADDITIONAL TESTS:

## 2020-06-22 NOTE — DISCHARGE NOTE PROVIDER - HOSPITAL COURSE
86yo female dementia, recent L hip surgery and discharged from rehab 5 days ago presents on 6/19 R shoulder pain and R scalp lac s/p fall night before 6/18. Patient poor historian and says she is here "for a checkup". Rest of history gathered from son Feliz. Patient lives with her  and both use walkers and both of them got intertwined and caused her to fall back onto her R side. Patient was able to get up after and no LOC. Dx: Unsteady Gait, s/p Mechanical Fall, Social Admit        Left arm pain - +arm pain w/walker     Aggressive PT to restore level of function in the affected arm    +fall risk and limited resources  / help at home - seen and followed by PT         Shoulder pain, right.  PT and strengthening.  No fracture by x-ray.         Scalp laceration - local wound care.         Dementia - needs help at home         Hypertension - low salt diet & meds as needed.         Lung cancer - stable.         Hyperlipidemia - low fat low cholesterol diet.        On 6/22 COVID sent and plan for d/c on 6/23  - d/w Dr. Teague

## 2020-06-22 NOTE — DISCHARGE NOTE NURSING/CASE MANAGEMENT/SOCIAL WORK - PATIENT PORTAL LINK FT
You can access the FollowMyHealth Patient Portal offered by [facility] by registering at the following website: [email or web address]. By joining [email or web address] FollowMyHealth portal, you will also be able to view your health information using other applications (apps) compatible with our system.

## 2020-06-22 NOTE — DISCHARGE NOTE PROVIDER - NSDCCPCAREPLAN_GEN_ALL_CORE_FT
PRINCIPAL DISCHARGE DIAGNOSIS  Diagnosis: Scalp laceration, initial encounter  Assessment and Plan of Treatment: S/p fall   Left arm pain - +arm pain w/walker   Aggressive PT to restore level of function in the affected arm  +fall risk and limited resources    Seen and followed by PTn hilaria MENDOSA      SECONDARY DISCHARGE DIAGNOSES  Diagnosis: Shoulder pain, right  Assessment and Plan of Treatment: Shoulder pain, right.  PT and strengthening.  No fracture by x-ray.   [facility] follow-up out-pt    Diagnosis: Scalp laceration, initial encounter  Assessment and Plan of Treatment: Scalp laceration - local wound care.    Diagnosis: Dementia  Assessment and Plan of Treatment: Dementia - needs help at home    Diagnosis: Hypertension  Assessment and Plan of Treatment: Hypertension - low salt diet & meds as needed.

## 2020-06-22 NOTE — PROGRESS NOTE ADULT - ASSESSMENT
patiens admitted because she is unable to use her walker due to her right shoulder  injury  and she is too unsteady on her feet without her walker to be at home as there is noone there to hlpe her as her  is too frail and he himself uses a walker and their son isn availabel only 3 time per week. Patient has been well with no new issues. discussed case with NP. LACEY planning to rehab. awaiting results of covid swab

## 2020-06-23 VITALS
RESPIRATION RATE: 18 BRPM | OXYGEN SATURATION: 98 % | HEART RATE: 66 BPM | DIASTOLIC BLOOD PRESSURE: 63 MMHG | SYSTOLIC BLOOD PRESSURE: 115 MMHG | TEMPERATURE: 98 F

## 2020-06-23 LAB — SARS-COV-2 RNA SPEC QL NAA+PROBE: SIGNIFICANT CHANGE UP

## 2020-06-23 PROCEDURE — 90471 IMMUNIZATION ADMIN: CPT

## 2020-06-23 PROCEDURE — 71045 X-RAY EXAM CHEST 1 VIEW: CPT

## 2020-06-23 PROCEDURE — 85027 COMPLETE CBC AUTOMATED: CPT

## 2020-06-23 PROCEDURE — 97116 GAIT TRAINING THERAPY: CPT

## 2020-06-23 PROCEDURE — 99285 EMERGENCY DEPT VISIT HI MDM: CPT | Mod: 25

## 2020-06-23 PROCEDURE — 81003 URINALYSIS AUTO W/O SCOPE: CPT

## 2020-06-23 PROCEDURE — 80053 COMPREHEN METABOLIC PANEL: CPT

## 2020-06-23 PROCEDURE — 97110 THERAPEUTIC EXERCISES: CPT

## 2020-06-23 PROCEDURE — 86769 SARS-COV-2 COVID-19 ANTIBODY: CPT

## 2020-06-23 PROCEDURE — 70450 CT HEAD/BRAIN W/O DYE: CPT

## 2020-06-23 PROCEDURE — 97161 PT EVAL LOW COMPLEX 20 MIN: CPT

## 2020-06-23 PROCEDURE — 87086 URINE CULTURE/COLONY COUNT: CPT

## 2020-06-23 PROCEDURE — 90715 TDAP VACCINE 7 YRS/> IM: CPT

## 2020-06-23 PROCEDURE — 73060 X-RAY EXAM OF HUMERUS: CPT

## 2020-06-23 PROCEDURE — 72170 X-RAY EXAM OF PELVIS: CPT

## 2020-06-23 PROCEDURE — U0003: CPT

## 2020-06-23 PROCEDURE — 73030 X-RAY EXAM OF SHOULDER: CPT

## 2020-06-23 RX ORDER — ATORVASTATIN CALCIUM 80 MG/1
1 TABLET, FILM COATED ORAL
Qty: 0 | Refills: 0 | DISCHARGE
Start: 2020-06-23

## 2020-06-23 RX ORDER — ROSUVASTATIN CALCIUM 5 MG/1
1 TABLET ORAL
Qty: 0 | Refills: 0 | DISCHARGE

## 2020-06-23 RX ADMIN — Medication 20 MILLIGRAM(S): at 05:12

## 2020-06-23 RX ADMIN — Medication 1000 UNIT(S): at 05:13

## 2020-06-23 RX ADMIN — Medication 81 MILLIGRAM(S): at 08:54

## 2020-06-23 RX ADMIN — Medication 650 MILLIGRAM(S): at 11:31

## 2020-06-23 RX ADMIN — AMLODIPINE BESYLATE 2.5 MILLIGRAM(S): 2.5 TABLET ORAL at 05:12

## 2020-06-23 NOTE — PROGRESS NOTE ADULT - SUBJECTIVE AND OBJECTIVE BOX
86yo female dementia, recent L hip surgery and discharged from rehab 5 days ago p/w R shoulder pain and R scalp lac s/p fall last night. Patient poor historian and says she is here "for a checkup". Patient denies neck pain, hip pain, urinary complaints, fever, headache. Rest of history gathered from son Feliz. Patient lives with her  and both use walkers and both of them got intertwined and caused her to fall back onto her R side around 9pm last night. Patient was able to get up after and no LOC. No aide at home, son stops by a couple times per week. Patient seen OOB in chair. Very pleasant but confused.  Patine  needs to go to rehab in order to get her to be able to use walker .  Her injured arm is responsible for this and  needs to improve so she can use walker. Await approval for rehab placement    MEDICATIONS  (STANDING):  amLODIPine   Tablet 2.5 milliGRAM(s) Oral daily  aspirin enteric coated 81 milliGRAM(s) Oral daily  atorvastatin 40 milliGRAM(s) Oral at bedtime  cholecalciferol 1000 Unit(s) Oral two times a day  furosemide    Tablet 20 milliGRAM(s) Oral daily  senna 2 Tablet(s) Oral at bedtime    MEDICATIONS  (PRN):  acetaminophen   Tablet .. 650 milliGRAM(s) Oral every 6 hours PRN Mild Pain (1 - 3)  oxycodone    5 mG/acetaminophen 325 mG 1 Tablet(s) Oral every 6 hours PRN Moderate Pain (4 - 6)          VITALS:   Vital Signs Last 24 Hrs  T(C): 36.4 (23 Jun 2020 11:56), Max: 36.4 (23 Jun 2020 05:01)  T(F): 97.5 (23 Jun 2020 11:56), Max: 97.6 (23 Jun 2020 05:01)  HR: 66 (23 Jun 2020 11:56) (61 - 66)  BP: 115/63 (23 Jun 2020 11:56) (115/63 - 160/72)  RR: 18 (23 Jun 2020 11:56) (18 - 18)  SpO2: 98% (23 Jun 2020 11:56) (98% - 99%)      PHYSICAL EXAM:  GENERAL: NAD, well nourished and conversant  HEAD:  Atraumatic  EYES: EOM, PERRLA, conjunctiva pink and sclera white  ENT: No tonsillar erythema, exudates, or enlargement, moist mucous membranes, good dentition, no lesions  NECK: Supple, No JVD, normal thyroid, carotids with normal upstrokes and no bruits  CHEST/LUNG: Clear to auscultation bilaterally, No rales, rhonchi, wheezing, or rubs  HEART: Regular rate and rhythm, No murmurs, rubs, or gallops  ABDOMEN: Soft, nondistended, no masses, guarding, tenderness or rebound, bowel sounds present  EXTREMITIES:  2+ Peripheral Pulses, No clubbing, cyanosis, or edema. (+) decreased ROM of arm  needs PT  LYMPH: No lymphadenopathy noted  SKIN: No rashes or lesions  NERVOUS SYSTEM:  Alert & Oriented X3, normal cognitive function. Motor Strength 5/5 right upper and right lower.  5/5 left upper and left lower extremities, DTRs 2+ intact and symmetric      LABS:      no labs 6/23                CAPILLARY BLOOD GLUCOSE          RADIOLOGY & ADDITIONAL TESTS:

## 2020-07-16 ENCOUNTER — RESULT REVIEW (OUTPATIENT)
Age: 85
End: 2020-07-16

## 2020-07-16 ENCOUNTER — OUTPATIENT (OUTPATIENT)
Dept: OUTPATIENT SERVICES | Facility: HOSPITAL | Age: 85
LOS: 1 days | End: 2020-07-16
Payer: MEDICARE

## 2020-07-16 ENCOUNTER — APPOINTMENT (OUTPATIENT)
Dept: CT IMAGING | Facility: IMAGING CENTER | Age: 85
End: 2020-07-16
Payer: MEDICARE

## 2020-07-16 DIAGNOSIS — Z98.49 CATARACT EXTRACTION STATUS, UNSPECIFIED EYE: Chronic | ICD-10-CM

## 2020-07-16 DIAGNOSIS — Z98.890 OTHER SPECIFIED POSTPROCEDURAL STATES: Chronic | ICD-10-CM

## 2020-07-16 DIAGNOSIS — Z90.2 ACQUIRED ABSENCE OF LUNG [PART OF]: Chronic | ICD-10-CM

## 2020-07-16 DIAGNOSIS — C34.90 MALIGNANT NEOPLASM OF UNSPECIFIED PART OF UNSPECIFIED BRONCHUS OR LUNG: ICD-10-CM

## 2020-07-16 PROCEDURE — 71250 CT THORAX DX C-: CPT | Mod: 26

## 2020-07-16 PROCEDURE — 71250 CT THORAX DX C-: CPT

## 2020-07-23 ENCOUNTER — APPOINTMENT (OUTPATIENT)
Dept: THORACIC SURGERY | Facility: CLINIC | Age: 85
End: 2020-07-23
Payer: MEDICARE

## 2020-07-23 DIAGNOSIS — C34.90 MALIGNANT NEOPLASM OF UNSPECIFIED PART OF UNSPECIFIED BRONCHUS OR LUNG: ICD-10-CM

## 2020-07-23 PROCEDURE — 99442: CPT

## 2020-07-24 NOTE — HISTORY OF PRESENT ILLNESS
[Medical Office: ([facility])___] : at the medical office located in  [Home] : at home, [unfilled] , at the time of the visit. [Verbal consent obtained from patient] : the patient, [unfilled] [Family Member] : family member [Other:____] : [unfilled] [FreeTextEntry1] : 86 y/o F, never smoker, w/ hx of HLD, Dementia, and metachronous Lung CA.\par Now 10 yrs 4 months s/p FB, mediastinoscopy, Lt VATS LULobectomy on 8/19/09. Path revealed AdenoCA, 1.4cm, margins and LNs negative, pT1N0 Stg IA.\par \par Now 2.5 yrs s/p FB, Rt VATS, Robotic Assisted, lysis of adhesions, RUL wedge rxn x 2, MLND on 1/17/18. Path revealed AdenoCA, predominantly acinar with minor solid component, 1.6cm, LNs and margins are negative, pT1bN0 Stg IA2. +TTF-1. The second nodule in the RUL showed no significant pathologic changes.\par \par CT chest on 6/1/19:\par - stable post-op changes\par - stable 6mm RLL nodule\par - ALPHONSE\par \par CT Chest on 12/9/19:\par - stable post-op changes\par - ALPHONSE\par \par CT Chest on 7/16/20:\par - stable dilated aorta 4.4 cm\par - post-op changes\par - two stable 8 mm nodules in RLL\par \par Of note, patient is s/p fall on 04/22/2020 s/p Left hip surgery and discharged to rehab. s/p fall again on 06/18/2020. Patient is currently at rehab and will be home on 07/26/2020. \par \par Patient's son (HCP) is followed today via Telephonic visit.

## 2020-07-24 NOTE — CONSULT LETTER
[Consult Letter:] : I had the pleasure of evaluating your patient, [unfilled]. [Dear  ___] : Dear  [unfilled], [Consult Closing:] : Thank you very much for allowing me to participate in the care of this patient.  If you have any questions, please do not hesitate to contact me. [( Thank you for referring [unfilled] for consultation for _____ )] : Thank you for referring [unfilled] for consultation for [unfilled] [Please see my note below.] : Please see my note below. [Sincerely,] : Sincerely, [DrMargaret  ___] : Dr. DUMONT [DrMargaret ___] : Dr. DUMONT [FreeTextEntry2] : Santana Dougherty MD (Pul/Ref)\par Behzad Paimany, MD (Cardiologist)\par Zackary Eaton MD (PCP)  [FreeTextEntry3] : Bj Bright MD, MPH \par System Director of Thoracic Surgery \par Director of Comprehensive Lung and Foregut Sneads \par Professor Cardiovascular & Thoracic Surgery  \par [employer] [facility]\par

## 2020-07-24 NOTE — ASSESSMENT
[FreeTextEntry1] : 88 y/o F, never smoker, w/ hx of HLD, Dementia, and metachronous Lung CA.\par Now 11 yrs s/p FB, mediastinoscopy, Lt VATS LULobectomy on 8/19/09. Path revealed AdenoCA, 1.4cm, margins and LNs negative, pT1N0 Stg IA.\par \par Now 2.5 yrs s/p FB, Rt VATS, Robotic Assisted, lysis of adhesions, RUL wedge rxn x 2, MLND on 1/17/18. Path revealed AdenoCA, predominantly acinar with minor solid component, 1.6cm, LNs and margins are negative, pT1bN0 Stg IA2. +TTF-1. The second nodule in the RUL showed no significant pathologic changes.\par \par CT Chest on 7/16/20:\par - stable dilated aorta 4.4 cm\par - post-op changes\par - two stable 8 mm nodules in RLL\par \par Of note, patient is s/p fall on 04/22/2020 s/p Left hip surgery and discharged to rehab. s/p fall again on 06/18/2020. Patient is currently at rehab and will be home on 07/26/2020. \par \par I have reviewed the patient's medical records and diagnostic images at time of this office consultation and have made the following recommendation:\par 1. CT chest reviewed and explained to patient's son, I recommended patient to return to office in 6 months with CT Chest without contrast.\par \par I have spent 15 minutes on the phone discussing above result and plan of care with patient.\par \par \par I personally performed the services described in the documentation, reviewed the documentation recorded by the scribe in my presence and it accurately and completely records my words and actions.\par \par I, Lilian Alejandro, NP, am scribing for and the presence of NATHAN Pérez, the following sections HISTORY OF PRESENT ILLNESS, PAST MEDICAL/FAMILY/SOCIAL HISTORY; REVIEW OF SYSTEMS; VITAL SIGNS; PHYSICAL EXAM; DISPOSITION.

## 2020-10-11 NOTE — DISCHARGE NOTE PROVIDER - NSDCCPGOAL_GEN_ALL_CORE_FT
Problem: Pressure Injury - Risk of  Goal: *Prevention of pressure injury  Description: Document Enrique Scale and appropriate interventions in the flowsheet. Outcome: Progressing Towards Goal  Note: Pressure Injury Interventions:  Sensory Interventions: Assess need for specialty bed, Avoid rigorous massage over bony prominences, Discuss PT/OT consult with provider, Float heels, Keep linens dry and wrinkle-free, Maintain/enhance activity level, Monitor skin under medical devices, Pad between skin to skin    Moisture Interventions: Apply protective barrier, creams and emollients, Assess need for specialty bed, Check for incontinence Q2 hours and as needed, Limit adult briefs, Maintain skin hydration (lotion/cream), Minimize layers    Activity Interventions: Assess need for specialty bed, Increase time out of bed, PT/OT evaluation    Mobility Interventions: Assess need for specialty bed, Float heels, PT/OT evaluation, Turn and reposition approx. every two hours(pillow and wedges)    Nutrition Interventions: Document food/fluid/supplement intake    Friction and Shear Interventions: Apply protective barrier, creams and emollients, Lift sheet, Lift team/patient mobility team, Minimize layers     Problem: Patient Education: Go to Patient Education Activity  Goal: Patient/Family Education  Outcome: Progressing Towards Goal     Problem: Falls - Risk of  Goal: *Absence of Falls  Description: Document Rochele Divya Fall Risk and appropriate interventions in the flowsheet.   Outcome: Progressing Towards Goal  Note: Fall Risk Interventions:  Mobility Interventions: Communicate number of staff needed for ambulation/transfer, OT consult for ADLs, Patient to call before getting OOB, PT Consult for mobility concerns, Strengthening exercises (ROM-active/passive)    Mentation Interventions: Adequate sleep, hydration, pain control, Door open when patient unattended, Eyeglasses and hearing aids, Familiar objects from home, Increase mobility, More frequent rounding, Reorient patient    Medication Interventions: Assess postural VS orthostatic hypotension, Evaluate medications/consider consulting pharmacy, Patient to call before getting OOB    Elimination Interventions: Call light in reach, Elevated toilet seat, Stay With Me (per policy), Toilet paper/wipes in reach    History of Falls Interventions: Consult care management for discharge planning, Door open when patient unattended, Investigate reason for fall, Room close to nurse's station, Utilize gait belt for transfer/ambulation  Problem: Patient Education: Go to Patient Education Activity  Goal: Patient/Family Education  Outcome: Progressing Towards Goal     Problem: Nutrition Deficit  Goal: *Optimize nutritional status  Outcome: Progressing Towards Goal     Problem: Breathing Pattern - Ineffective  Goal: *Absence of hypoxia  Outcome: Progressing Towards Goal  Goal: *Use of effective breathing techniques  Outcome: Progressing Towards Goal     Problem: Breathing Pattern - Ineffective  Goal: *Absence of hypoxia  Outcome: Progressing Towards Goal  Goal: *Use of effective breathing techniques  Outcome: Progressing Towards Goal     Problem: Breathing Pattern - Ineffective  Goal: *Absence of hypoxia  Outcome: Progressing Towards Goal  Goal: *Use of effective breathing techniques  Outcome: Progressing Towards Goal  Goal: *PALLIATIVE CARE:  Alleviation of Dyspnea  Outcome: Progressing Towards Goal     Problem: Patient Education: Go to Patient Education Activity  Goal: Patient/Family Education  Outcome: Progressing Towards Goal     Problem: Breathing Pattern - Ineffective  Goal: *Absence of hypoxia  Outcome: Progressing Towards Goal  Goal: *Use of effective breathing techniques  Outcome: Progressing Towards Goal  Goal: *PALLIATIVE CARE:  Alleviation of Dyspnea  Outcome: Progressing Towards Goal     Problem: Patient Education: Go to Patient Education Activity  Goal: Patient/Family Education  Outcome: Progressing Towards Goal     Problem: Breathing Pattern - Ineffective  Goal: *Absence of hypoxia  Outcome: Progressing Towards Goal  Goal: *Use of effective breathing techniques  Outcome: Progressing Towards Goal  Goal: *PALLIATIVE CARE:  Alleviation of Dyspnea  Outcome: Progressing Towards Goal     Problem: Patient Education: Go to Patient Education Activity  Goal: Patient/Family Education  Outcome: Progressing Towards Goal     Problem: Patient Education: Go to Patient Education Activity  Goal: Patient/Family Education  Outcome: Progressing Towards Goal     Problem: Breathing Pattern - Ineffective  Goal: *Absence of hypoxia  Outcome: Progressing Towards Goal  Goal: *Use of effective breathing techniques  Outcome: Progressing Towards Goal  Goal: *PALLIATIVE CARE:  Alleviation of Dyspnea  Outcome: Progressing Towards Goal     Problem: Patient Education: Go to Patient Education Activity  Goal: Patient/Family Education  Outcome: Progressing Towards Goal     Problem: Breathing Pattern - Ineffective  Goal: *Absence of hypoxia  Outcome: Progressing Towards Goal  Goal: *Use of effective breathing techniques  Outcome: Progressing Towards Goal  Goal: *PALLIATIVE CARE:  Alleviation of Dyspnea  Outcome: Progressing Towards Goal     Problem: Patient Education: Go to Patient Education Activity  Goal: Patient/Family Education  Outcome: Progressing Towards Goal     Problem: Breathing Pattern - Ineffective  Goal: *Absence of hypoxia  Outcome: Progressing Towards Goal  Goal: *Use of effective breathing techniques  Outcome: Progressing Towards Goal  Goal: *PALLIATIVE CARE:  Alleviation of Dyspnea  Outcome: Progressing Towards Goal     Problem: Patient Education: Go to Patient Education Activity  Goal: Patient/Family Education  Outcome: Progressing Towards Goal     Problem: Patient Education: Go to Patient Education Activity  Goal: Patient/Family Education  Outcome: Progressing Towards Goal     Problem: Patient Education: Go to Patient Education Activity  Goal: Patient/Family Education  Outcome: Progressing Towards Goal     Problem: Patient Education: Go to Patient Education Activity  Goal: Patient/Family Education  Outcome: Progressing Towards Goal     Problem: Breathing Pattern - Ineffective  Goal: *Absence of hypoxia  Outcome: Progressing Towards Goal  Goal: *Use of effective breathing techniques  Outcome: Progressing Towards Goal  Goal: *PALLIATIVE CARE:  Alleviation of Dyspnea  Outcome: Progressing Towards Goal     Problem: Patient Education: Go to Patient Education Activity  Goal: Patient/Family Education  Outcome: Progressing Towards Goal     Problem: Breathing Pattern - Ineffective  Goal: *Absence of hypoxia  Outcome: Progressing Towards Goal  Goal: *Use of effective breathing techniques  Outcome: Progressing Towards Goal  Goal: *PALLIATIVE CARE:  Alleviation of Dyspnea  Outcome: Progressing Towards Goal     Problem: Patient Education: Go to Patient Education Activity  Goal: Patient/Family Education  Outcome: Progressing Towards Goal     Bedside and Verbal shift change report given to Jean Navarrete RN (oncoming nurse) by Ivette Zuleta (offgoing nurse). Report included the following information SBAR, Kardex, MAR, Recent Results and Cardiac Rhythm NSR to ST.      Patient Vitals for the past 12 hrs:   Temp Pulse Resp BP SpO2   10/11/20 1622 97.4 °F (36.3 °C) 85 18 122/74 100 %   10/11/20 1600 -- -- -- -- 100 %   10/11/20 1342 97.6 °F (36.4 °C) 80 18 (!) 143/89 100 %   10/11/20 1200 -- -- -- -- 99 %   10/11/20 0930 -- (!) 102 -- (!) 143/84 --   10/11/20 0800 -- -- -- -- 95 %   10/11/20 0732 98.2 °F (36.8 °C) (!) 102 18 (!) 145/94 99 % To get better and follow your care plan as instructed.

## 2020-11-26 ENCOUNTER — INPATIENT (INPATIENT)
Facility: HOSPITAL | Age: 85
LOS: 7 days | Discharge: NURSING, REHAB OR LONG-TERM CARE | DRG: 536 | End: 2020-12-04
Attending: INTERNAL MEDICINE | Admitting: INTERNAL MEDICINE
Payer: MEDICARE

## 2020-11-26 VITALS
DIASTOLIC BLOOD PRESSURE: 63 MMHG | HEIGHT: 65 IN | SYSTOLIC BLOOD PRESSURE: 135 MMHG | WEIGHT: 164.91 LBS | HEART RATE: 60 BPM | TEMPERATURE: 98 F | OXYGEN SATURATION: 95 % | RESPIRATION RATE: 17 BRPM

## 2020-11-26 DIAGNOSIS — S32.9XXA FRACTURE OF UNSPECIFIED PARTS OF LUMBOSACRAL SPINE AND PELVIS, INITIAL ENCOUNTER FOR CLOSED FRACTURE: ICD-10-CM

## 2020-11-26 DIAGNOSIS — E78.5 HYPERLIPIDEMIA, UNSPECIFIED: ICD-10-CM

## 2020-11-26 DIAGNOSIS — Z98.890 OTHER SPECIFIED POSTPROCEDURAL STATES: Chronic | ICD-10-CM

## 2020-11-26 DIAGNOSIS — Z90.2 ACQUIRED ABSENCE OF LUNG [PART OF]: Chronic | ICD-10-CM

## 2020-11-26 DIAGNOSIS — I10 ESSENTIAL (PRIMARY) HYPERTENSION: ICD-10-CM

## 2020-11-26 DIAGNOSIS — F03.90 UNSPECIFIED DEMENTIA, UNSPECIFIED SEVERITY, WITHOUT BEHAVIORAL DISTURBANCE, PSYCHOTIC DISTURBANCE, MOOD DISTURBANCE, AND ANXIETY: ICD-10-CM

## 2020-11-26 DIAGNOSIS — Z98.49 CATARACT EXTRACTION STATUS, UNSPECIFIED EYE: Chronic | ICD-10-CM

## 2020-11-26 LAB
ALBUMIN SERPL ELPH-MCNC: 3.8 G/DL — SIGNIFICANT CHANGE UP (ref 3.3–5)
ALP SERPL-CCNC: 256 U/L — HIGH (ref 40–120)
ALT FLD-CCNC: 24 U/L — SIGNIFICANT CHANGE UP (ref 10–45)
ANION GAP SERPL CALC-SCNC: 12 MMOL/L — SIGNIFICANT CHANGE UP (ref 5–17)
APTT BLD: 30.8 SEC — SIGNIFICANT CHANGE UP (ref 27.5–35.5)
AST SERPL-CCNC: 28 U/L — SIGNIFICANT CHANGE UP (ref 10–40)
BASOPHILS # BLD AUTO: 0.04 K/UL — SIGNIFICANT CHANGE UP (ref 0–0.2)
BASOPHILS NFR BLD AUTO: 0.6 % — SIGNIFICANT CHANGE UP (ref 0–2)
BILIRUB SERPL-MCNC: 0.4 MG/DL — SIGNIFICANT CHANGE UP (ref 0.2–1.2)
BUN SERPL-MCNC: 20 MG/DL — SIGNIFICANT CHANGE UP (ref 7–23)
CALCIUM SERPL-MCNC: 9.9 MG/DL — SIGNIFICANT CHANGE UP (ref 8.4–10.5)
CHLORIDE SERPL-SCNC: 102 MMOL/L — SIGNIFICANT CHANGE UP (ref 96–108)
CO2 SERPL-SCNC: 24 MMOL/L — SIGNIFICANT CHANGE UP (ref 22–31)
CREAT SERPL-MCNC: 0.64 MG/DL — SIGNIFICANT CHANGE UP (ref 0.5–1.3)
EOSINOPHIL # BLD AUTO: 0.03 K/UL — SIGNIFICANT CHANGE UP (ref 0–0.5)
EOSINOPHIL NFR BLD AUTO: 0.4 % — SIGNIFICANT CHANGE UP (ref 0–6)
GLUCOSE SERPL-MCNC: 84 MG/DL — SIGNIFICANT CHANGE UP (ref 70–99)
HCT VFR BLD CALC: 40.9 % — SIGNIFICANT CHANGE UP (ref 34.5–45)
HGB BLD-MCNC: 13.3 G/DL — SIGNIFICANT CHANGE UP (ref 11.5–15.5)
IMM GRANULOCYTES NFR BLD AUTO: 1.2 % — SIGNIFICANT CHANGE UP (ref 0–1.5)
INR BLD: 0.98 RATIO — SIGNIFICANT CHANGE UP (ref 0.88–1.16)
LYMPHOCYTES # BLD AUTO: 0.88 K/UL — LOW (ref 1–3.3)
LYMPHOCYTES # BLD AUTO: 12.2 % — LOW (ref 13–44)
MCHC RBC-ENTMCNC: 31.9 PG — SIGNIFICANT CHANGE UP (ref 27–34)
MCHC RBC-ENTMCNC: 32.5 GM/DL — SIGNIFICANT CHANGE UP (ref 32–36)
MCV RBC AUTO: 98.1 FL — SIGNIFICANT CHANGE UP (ref 80–100)
MONOCYTES # BLD AUTO: 0.31 K/UL — SIGNIFICANT CHANGE UP (ref 0–0.9)
MONOCYTES NFR BLD AUTO: 4.3 % — SIGNIFICANT CHANGE UP (ref 2–14)
NEUTROPHILS # BLD AUTO: 5.88 K/UL — SIGNIFICANT CHANGE UP (ref 1.8–7.4)
NEUTROPHILS NFR BLD AUTO: 81.3 % — HIGH (ref 43–77)
NRBC # BLD: 0 /100 WBCS — SIGNIFICANT CHANGE UP (ref 0–0)
PLATELET # BLD AUTO: 226 K/UL — SIGNIFICANT CHANGE UP (ref 150–400)
POTASSIUM SERPL-MCNC: 4.1 MMOL/L — SIGNIFICANT CHANGE UP (ref 3.5–5.3)
POTASSIUM SERPL-SCNC: 4.1 MMOL/L — SIGNIFICANT CHANGE UP (ref 3.5–5.3)
PROT SERPL-MCNC: 6.7 G/DL — SIGNIFICANT CHANGE UP (ref 6–8.3)
PROTHROM AB SERPL-ACNC: 11.8 SEC — SIGNIFICANT CHANGE UP (ref 10.6–13.6)
RBC # BLD: 4.17 M/UL — SIGNIFICANT CHANGE UP (ref 3.8–5.2)
RBC # FLD: 13.2 % — SIGNIFICANT CHANGE UP (ref 10.3–14.5)
SARS-COV-2 RNA SPEC QL NAA+PROBE: SIGNIFICANT CHANGE UP
SODIUM SERPL-SCNC: 138 MMOL/L — SIGNIFICANT CHANGE UP (ref 135–145)
WBC # BLD: 7.23 K/UL — SIGNIFICANT CHANGE UP (ref 3.8–10.5)
WBC # FLD AUTO: 7.23 K/UL — SIGNIFICANT CHANGE UP (ref 3.8–10.5)

## 2020-11-26 PROCEDURE — 93010 ELECTROCARDIOGRAM REPORT: CPT

## 2020-11-26 PROCEDURE — 73552 X-RAY EXAM OF FEMUR 2/>: CPT | Mod: 26,RT

## 2020-11-26 PROCEDURE — 73503 X-RAY EXAM HIP UNI 4/> VIEWS: CPT | Mod: 26,RT

## 2020-11-26 PROCEDURE — 70450 CT HEAD/BRAIN W/O DYE: CPT | Mod: 26

## 2020-11-26 PROCEDURE — 71045 X-RAY EXAM CHEST 1 VIEW: CPT | Mod: 26

## 2020-11-26 PROCEDURE — 72125 CT NECK SPINE W/O DYE: CPT | Mod: 26

## 2020-11-26 PROCEDURE — 99285 EMERGENCY DEPT VISIT HI MDM: CPT

## 2020-11-26 RX ORDER — DEXTROSE MONOHYDRATE, SODIUM CHLORIDE, AND POTASSIUM CHLORIDE 50; .745; 4.5 G/1000ML; G/1000ML; G/1000ML
1000 INJECTION, SOLUTION INTRAVENOUS
Refills: 0 | Status: DISCONTINUED | OUTPATIENT
Start: 2020-11-26 | End: 2020-11-28

## 2020-11-26 RX ORDER — SODIUM CHLORIDE 9 MG/ML
250 INJECTION INTRAMUSCULAR; INTRAVENOUS; SUBCUTANEOUS ONCE
Refills: 0 | Status: COMPLETED | OUTPATIENT
Start: 2020-11-26 | End: 2020-11-26

## 2020-11-26 RX ORDER — CHOLECALCIFEROL (VITAMIN D3) 125 MCG
1000 CAPSULE ORAL
Refills: 0 | Status: DISCONTINUED | OUTPATIENT
Start: 2020-11-26 | End: 2020-12-04

## 2020-11-26 RX ORDER — FUROSEMIDE 40 MG
20 TABLET ORAL DAILY
Refills: 0 | Status: DISCONTINUED | OUTPATIENT
Start: 2020-11-26 | End: 2020-11-29

## 2020-11-26 RX ORDER — AMLODIPINE BESYLATE 2.5 MG/1
2.5 TABLET ORAL DAILY
Refills: 0 | Status: DISCONTINUED | OUTPATIENT
Start: 2020-11-26 | End: 2020-12-04

## 2020-11-26 RX ORDER — ACETAMINOPHEN 500 MG
650 TABLET ORAL ONCE
Refills: 0 | Status: COMPLETED | OUTPATIENT
Start: 2020-11-26 | End: 2020-11-26

## 2020-11-26 RX ORDER — ASPIRIN/CALCIUM CARB/MAGNESIUM 324 MG
81 TABLET ORAL DAILY
Refills: 0 | Status: DISCONTINUED | OUTPATIENT
Start: 2020-11-26 | End: 2020-12-04

## 2020-11-26 RX ORDER — ACETAMINOPHEN 500 MG
1000 TABLET ORAL EVERY 6 HOURS
Refills: 0 | Status: COMPLETED | OUTPATIENT
Start: 2020-11-26 | End: 2020-11-29

## 2020-11-26 RX ORDER — MORPHINE SULFATE 50 MG/1
4 CAPSULE, EXTENDED RELEASE ORAL ONCE
Refills: 0 | Status: DISCONTINUED | OUTPATIENT
Start: 2020-11-26 | End: 2020-11-26

## 2020-11-26 RX ORDER — ATORVASTATIN CALCIUM 80 MG/1
40 TABLET, FILM COATED ORAL AT BEDTIME
Refills: 0 | Status: DISCONTINUED | OUTPATIENT
Start: 2020-11-26 | End: 2020-12-04

## 2020-11-26 RX ORDER — SENNA PLUS 8.6 MG/1
2 TABLET ORAL AT BEDTIME
Refills: 0 | Status: DISCONTINUED | OUTPATIENT
Start: 2020-11-26 | End: 2020-12-04

## 2020-11-26 RX ADMIN — SODIUM CHLORIDE 250 MILLILITER(S): 9 INJECTION INTRAMUSCULAR; INTRAVENOUS; SUBCUTANEOUS at 16:30

## 2020-11-26 RX ADMIN — ATORVASTATIN CALCIUM 40 MILLIGRAM(S): 80 TABLET, FILM COATED ORAL at 21:33

## 2020-11-26 RX ADMIN — DEXTROSE MONOHYDRATE, SODIUM CHLORIDE, AND POTASSIUM CHLORIDE 75 MILLILITER(S): 50; .745; 4.5 INJECTION, SOLUTION INTRAVENOUS at 18:23

## 2020-11-26 RX ADMIN — SENNA PLUS 2 TABLET(S): 8.6 TABLET ORAL at 21:33

## 2020-11-26 RX ADMIN — Medication 1000 UNIT(S): at 18:23

## 2020-11-26 RX ADMIN — MORPHINE SULFATE 4 MILLIGRAM(S): 50 CAPSULE, EXTENDED RELEASE ORAL at 15:43

## 2020-11-26 NOTE — H&P ADULT - NSHPREVIEWOFSYSTEMS_GEN_ALL_CORE
REVIEW OF SYSTEM:  CONSTITUTIONAL: No fever, No change in weight, No fatigue  HEAD: No headache, No dizziness, No recent trauma  EYES: No eye pain, No visual disturbances, No discharge  ENT:  No difficulty hearing, No tinnitus, No vertigo, No sinus pain, No throat pain  NECK: No pain, No stiffness  BREASTS: No pain, No masses, No nipple discharge  RESPIRATORY: No cough, No wheezing, No chills, No hemoptysis, No shortness of breath at rest or exertional shortness of breath  CARDIOVASCULAR: No chest pain, No palpitations, No dizziness, No CHF, No arrhythmia, No cardiomegaly, No leg swelling  GASTROINTESTINAL: No abdominal, No epigastric pain. No nausea, No vomiting, No hematemesis, No diarrhea, No constipation. No melena, No hematochezia. No GERD  GENITOURINARY: No dysuria, No frequency, No hematuria, No incontinence, No nocturia, No hesitancy,  SKIN: No itching, No burning, No rashes, No lesions   LYMPH NODES: No history of enlarged glands  ENDOCRINE: No heat or cold intolerance, No hair loss. No osteoporosis, No thyroid disease  MUSCULOSKELETAL: No joint pain or swelling, right hip pain (+) pubic ramus fracture   PSYCHIATRIC: No depression, No anxiety, No mood swings, No difficulty sleeping  HEME/LYMPH: No easy bruising, No anticoagulants, No bleeding disorder, No bleeding gums  ALLERGY AND IMMUNOLOGIC: No hives, No eczema  NEUROLOGICAL: No memory loss, No loss of strength, No numbness, No tremors

## 2020-11-26 NOTE — H&P ADULT - PROBLEM SELECTOR PLAN 1
Right inferioir pubic ramus fracature  Pain control  use IV tylenol as she became extremely lethargic on one dose of narcotic pian med Right inferioir pubic ramus fracture  Pain control  use IV tylenol as she became extremely lethargic on one dose of narcotic pian med

## 2020-11-26 NOTE — H&P ADULT - NSHPLABSRESULTS_GEN_ALL_CORE
CBC Full  -  ( 26 Nov 2020 14:54 )  WBC Count : 7.23 K/uL  RBC Count : 4.17 M/uL  Hemoglobin : 13.3 g/dL  Hematocrit : 40.9 %  Platelet Count - Automated : 226 K/uL  Mean Cell Volume : 98.1 fl  Mean Cell Hemoglobin : 31.9 pg  Mean Cell Hemoglobin Concentration : 32.5 gm/dL  Auto Neutrophil # : 5.88 K/uL  Auto Lymphocyte # : 0.88 K/uL  Auto Monocyte # : 0.31 K/uL  Auto Eosinophil # : 0.03 K/uL  Auto Basophil # : 0.04 K/uL  Auto Neutrophil % : 81.3 %  Auto Lymphocyte % : 12.2 %  Auto Monocyte % : 4.3 %  Auto Eosinophil % : 0.4 %  Auto Basophil % : 0.6 %    11-26    138  |  102  |  20  ----------------------------<  84  4.1   |  24  |  0.64    Ca    9.9      26 Nov 2020 14:54    TPro  6.7  /  Alb  3.8  /  TBili  0.4  /  DBili  x   /  AST  28  /  ALT  24  /  AlkPhos  256<H>  11-26    LIVER FUNCTIONS - ( 26 Nov 2020 14:54 )  Alb: 3.8 g/dL / Pro: 6.7 g/dL / ALK PHOS: 256 U/L / ALT: 24 U/L / AST: 28 U/L / GGT: x           PT/INR - ( 26 Nov 2020 14:54 )   PT: 11.8 sec;   INR: 0.98 ratio         PTT - ( 26 Nov 2020 14:54 )  PTT:30.8 sec    inferior pubic remus fracture

## 2020-11-26 NOTE — ED PROVIDER NOTE - ATTENDING CONTRIBUTION TO CARE
88 F w/ pmh of ?dementia, is aaox2 (knows birthday, location and self- doesn't know today is thanksgiving) presents to the ER w/ fall ?unknown if hit head pt states "why am I here" presents to the ER w/ R hip pain. tenderness when palpating the hip.   I have reviewed the triage vital signs.  Const: Well-nourished, Well-developed, appearing stated age  Head: atraumatic, normocephalic  Eyes: no conjunctival injection and no scleral icterus  ENMT: Atraumatic external nose and ears, Moist mucus membranes  Neck: Symmetric, trachea midline,  CVS: +S1/S2, dorsalis pedis/radial pulse 2+ bilaterally  RESP: Unlabored respiratory effort, Clear to auscultation bilaterally  GI: Nontender/Nondistended, soft abdomen  MSK: Extremities w/o deformity or ttp   Skin: Warm, Dry w/ no rashes  Neuro: GCS=15, CNs II-XII grossly intact, motor in all 4 extremities equal, Sensation grossly intact   Psych: Awake, Alert, & Orientedx3;  Appropriate mood and affect, cooperative  Will have labs, ekg and ct imaging, concern for hip fx vs pelvis fracture. 88 F w/ pmh of ?dementia, is aaox2 (knows birthday, location and self- doesn't know today is thanksgiving) presents to the ER w/ fall ?unknown if hit head pt states "why am I here" presents to the ER w/ R hip pain. tenderness when palpating the hip.   I have reviewed the triage vital signs.  Const: Well-nourished, Well-developed, appearing stated age  Head: atraumatic, normocephalic  Eyes: no conjunctival injection and no scleral icterus  ENMT: Atraumatic external nose and ears, Moist mucus membranes  Neck: Symmetric, trachea midline,  CVS: +S1/S2, dorsalis pedis/radial pulse 2+ bilaterally  RESP: Unlabored respiratory effort, Clear to auscultation bilaterally  GI: Nontender/Nondistended, soft abdomen  MSK: Extremities w/o deformity or ttp   : pelvis stable w/ pain ni the R hip w/ palptation  Skin: Warm, Dry w/ no rashes  Neuro: GCS=15 pupils are 3 mm and reactive, motor in all 4 extremities equal, Sensation intact in all 4 extremities  Psych: Awake, Alert, & Orientedx3;  Appropriate mood and affect, cooperative  Will have labs, ekg and ct imaging, concern for hip fx vs pelvis fracture.

## 2020-11-26 NOTE — H&P ADULT - NSTOBACCOSCREENHP_GEN_A_NCS
Per last office note the patient has partial complex seizures with secondary generalization. Dmitri Soto, notified. No

## 2020-11-26 NOTE — CONSULT NOTE ADULT - SUBJECTIVE AND OBJECTIVE BOX
88yFemale with PMH of dementia, L hip IMN by Dr. Forrest in April 2020 c/o right groin pain s/p mechanical fall. Patient is alert and oriented to person and place. Patient denies head hit or LOC. Patient denies numbness or tingling in the RLE. Patient denies any other injuries.     ROS: 10 point review of systems otherwise negative unless noted in HPI    PMH:  Prediabetes    Lung nodule    Lung cancer    Dementia    Hypertension    Hyperlipidemia      PSH:  History of cataract surgery    History of back surgery    S/P lobectomy of lung    L hip IMN (4/2020) by Dr. Forrest    AH: NKDA    Meds: See med rec    T(C): 36.8 (11-26-20 @ 14:18)  HR: 73 (11-26-20 @ 15:52)  BP: 108/73 (11-26-20 @ 15:52)  RR: 18 (11-26-20 @ 15:52)  SpO2: 100% (11-26-20 @ 15:52)  Wt(kg): --                        13.3   7.23  )-----------( 226      ( 26 Nov 2020 14:54 )             40.9     11-26    138  |  102  |  20  ----------------------------<  84  4.1   |  24  |  0.64    Ca    9.9      26 Nov 2020 14:54    TPro  6.7  /  Alb  3.8  /  TBili  0.4  /  DBili  x   /  AST  28  /  ALT  24  /  AlkPhos  256<H>  11-26    PT/INR - ( 26 Nov 2020 14:54 )   PT: 11.8 sec;   INR: 0.98 ratio         PTT - ( 26 Nov 2020 14:54 )  PTT:30.8 sec      PE  Gen: NAD, alert and oriented  Resp: Unlabored breathing  LLE: Skin intact, no ecchymosis,        SILT DP/SP/ Bronwyn/Saph,        +EHL/FHL/TA/Gastroc,        Hip/Knee/ankle painless ROM,        no tenderness with palpation over greater trochanter       DP+,        soft compartments, no calf ttp,        negative log roll      Secondary:  No TTP over bony landmarks, SILT BL, ROM intact BL, distal pulses palpable.    Imaging:  XR demonstrating right superior and inferior pubic ramus fracture 88yFemale with PMH of dementia, L hip IMN by Dr. Forrest in April 2020 c/o right groin pain s/p mechanical fall. Patient is alert and oriented to person and place. Patient denies head hit or LOC. Patient denies numbness or tingling in the RLE. Patient denies any other injuries. She uses an ambulatory assist device at baseline.     ROS: 10 point review of systems otherwise negative unless noted in HPI    PMH:  Prediabetes    Lung nodule    Lung cancer    Dementia    Hypertension    Hyperlipidemia      PSH:  History of cataract surgery    History of back surgery    S/P lobectomy of lung    L hip IMN (4/2020) by Dr. Forrest    AH: NKDA    Meds: See med rec    T(C): 36.8 (11-26-20 @ 14:18)  HR: 73 (11-26-20 @ 15:52)  BP: 108/73 (11-26-20 @ 15:52)  RR: 18 (11-26-20 @ 15:52)  SpO2: 100% (11-26-20 @ 15:52)  Wt(kg): --                        13.3   7.23  )-----------( 226      ( 26 Nov 2020 14:54 )             40.9     11-26    138  |  102  |  20  ----------------------------<  84  4.1   |  24  |  0.64    Ca    9.9      26 Nov 2020 14:54    TPro  6.7  /  Alb  3.8  /  TBili  0.4  /  DBili  x   /  AST  28  /  ALT  24  /  AlkPhos  256<H>  11-26    PT/INR - ( 26 Nov 2020 14:54 )   PT: 11.8 sec;   INR: 0.98 ratio         PTT - ( 26 Nov 2020 14:54 )  PTT:30.8 sec      PE  Gen: NAD, alert and oriented  Resp: Unlabored breathing  LLE: Skin intact, no ecchymosis,        SILT DP/SP/ [place]/Saph,        +EHL/FHL/TA/Gastroc,        Hip/Knee/ankle painless ROM,        no tenderness with palpation over greater trochanter       DP+,        soft compartments, no calf ttp,        negative log roll      Secondary:  No TTP over bony landmarks, SILT BL, ROM intact BL, distal pulses palpable.    Imaging:  XR demonstrating right superior and inferior pubic ramus fracture

## 2020-11-26 NOTE — ED ADULT NURSE NOTE - CHPI ED NUR SYMPTOMS NEG
no abrasion/no fever/no numbness/no tingling/no bleeding/no vomiting/no deformity/no loss of consciousness

## 2020-11-26 NOTE — CONSULT NOTE ADULT - ASSESSMENT
A/P: 87 yo F presenting with right inferior and superior pubic rami fractures s/p mechanical fall.     - Pain control  - WBAT with ambulatory assist devices as needed  - No acute orthopedic surgical intervention at this time.  - May follow up with Dr. Forrest in 1-2 weeks following discharge. Call office at [phone] to make appointment  - Please page back as needed if you have questions.

## 2020-11-26 NOTE — H&P ADULT - NSHPPHYSICALEXAM_GEN_ALL_CORE
PHYSICAL EXAM:    GENERAL: NAD, well nourished and conversant  HEAD:  Atraumatic  EYES: EOM, PERRLA, conjunctiva pink and sclera white  ENT: No tonsillar erythema, exudates, or enlargement, moist mucous membranes, good dentition, no lesions  NECK: Supple, No JVD, normal thyroid, carotids with normal upstrokes and no bruits  CHEST/LUNG: Clear to auscultation bilaterally, No rales, rhonchi, wheezing, or rubs  HEART: Regular rate and rhythm, No murmurs, rubs, or gallops  ABDOMEN: Soft, nondistended, no masses, guarding, tenderness or rebound, bowel sounds present  EXTREMITIES:  2+ Peripheral Pulses, No clubbing, cyanosis, or edema. No arthritis of shoulders, elbows, hands, hips, knees, ankles, or feet. No DJD C spine, T spine, or L/S spine (+) right pubic ramus fracture  LYMPH: No lymphadenopathy noted  SKIN: No rashes or lesions  NERVOUS SYSTEM:  Alert & Oriented X3, normal cognitive function. Motor Strength 5/5 right upper and right lower.  5/5 left upper and left lower extremities, DTRs 2+ intact and symmetric

## 2020-11-26 NOTE — H&P ADULT - ASSESSMENT
88 F w/ pmh of ?dementia, is aaox2 (knows birthday, location and self- doesn't know today is thanksgiving) presents to the ER w/ fall ?unknown if hit head pt states "why am I here" presents to the ER w/ R hip pain. tenderness when palpating the hip.  w/u shows that she has sustained an inferior pubic ramus facture and is admitted for pain control  She was given narcotic pain med and was difficult to arouses   Will use IV Tylenol for pain control

## 2020-11-26 NOTE — ED ADULT NURSE NOTE - NSIMPLEMENTINTERV_GEN_ALL_ED
Implemented All Fall with Harm Risk Interventions:  [place] to call system. Call bell, personal items and telephone within reach. Instruct patient to call for assistance. Room bathroom lighting operational. Non-slip footwear when patient is off stretcher. Physically safe environment: no spills, clutter or unnecessary equipment. Stretcher in lowest position, wheels locked, appropriate side rails in place. Provide visual cue, wrist band, yellow gown, etc. Monitor gait and stability. Monitor for mental status changes and reorient to person, place, and time. Review medications for side effects contributing to fall risk. Reinforce activity limits and safety measures with patient and family. Provide visual clues: red socks.

## 2020-11-26 NOTE — ED PROVIDER NOTE - OBJECTIVE STATEMENT
PGY3/MD Cachorro. 88 F w/ pmh of ?dementia, is aaox2 (knows birthday, location and self- doesn't know today is thanksgiving) presents to the ER w/ fall ?unknown if hit head pt states "why am I here" presents to the ER w/ R hip pain. tenderness when palpating the hip.

## 2020-11-26 NOTE — ED PROVIDER NOTE - PHYSICAL EXAMINATION
PGY3/MD Cachorro.   VITALS: reviewed  GEN: No apparent distress, A & O x 4  HEAD/EYES: NC/AT, PERRL, EOMI, anicteric sclerae, no conjunctival pallor  ENT: mucus membranes moist, oropharynx WNL, trachea midline, no JVD, neck is supple  RESP: lungs CTA with equal breath sounds bilaterally, chest wall nontender and atraumatic  CV: heart with reg rhythm S1, S2, no murmur; distal pulses intact and symmetric bilaterally  ABDOMEN: normoactive bowel sounds, soft, nondistended, nontender  MSK: extremities atraumatic and nontender, no edema, no asymmetry. + right hip tendeerness  SKIN: warm, dry, no rash, no bruising, no cyanosis. color appropriate for ethnicity  NEURO: alert, mentating appropriately, no facial asymmetry. gross sensation, motor, coordination are intact  PSYCH: Affect appropriate

## 2020-11-26 NOTE — ED PROVIDER NOTE - PROGRESS NOTE DETAILS
phone call to pts son alma no answer fall unwitnessed, unable to get up, pt has a hx of 6 falls in the year, pt has a hx of hip fracture and wrist fracture,   home meds- asa 81mg, crestor 5mg, vit d3, furosemide 20mg every other day, donepezil 10 mg, vit b12,   at bagel and cup off coffee, pt uses a walker , called ortho, will follow up in house. admission to Dr. Garcia

## 2020-11-26 NOTE — ED PROVIDER NOTE - CLINICAL SUMMARY MEDICAL DECISION MAKING FREE TEXT BOX
Aditi Sagastume MD 88 F w/ hx of fall from standing, unknown circumstances pt w/ dementia, per EMS, attempted to contact son who didn't answer call. Pt is aaox2 p/w R hip pain, unable to ambulate. fall occurred at home, No signs of head trauma, no chest pain, no bruising on chest or abdomen, pain in the R hip and pain w/ palpating the hip stable pelvis, Will have labs, ekg and imaging of the chest and pelvis/hip, plan for head ct possible ICH, vs electrolyte abnormality vs primary cardiac abnormality.

## 2020-11-26 NOTE — H&P ADULT - HISTORY OF PRESENT ILLNESS
Patient is a 88y old  Female who presents with a chief complaint of pubic ramus fracture    HPI:   88 F w/ pmh of ?dementia, is aaox2 (knows birthday, location and self- doesn't know today is thanksgiving) presents to the ER w/ fall ?unknown if hit head pt states "why am I here" presents to the ER w/ R hip pain. tenderness when palpating the hip.  w/u shows that she has sustained a pubic ramus facture and is admitted for pain control  Se was given narcotic pain med and was difficult to arouses   Will use IV Tylenol for pain control      MEDICATIONS  (STANDING):  sodium chloride 0.9% Bolus 250 milliLiter(s) IV Bolus once    MEDICATIONS  (PRN):      Allergies    No Known Allergies    Intolerances        REVIEW OF SYSTEM:  CONSTITUTIONAL: No fever, No change in weight, No fatigue  HEAD: No headache, No dizziness, No recent trauma  EYES: No eye pain, No visual disturbances, No discharge  ENT:  No difficulty hearing, No tinnitus, No vertigo, No sinus pain, No throat pain  NECK: No pain, No stiffness  BREASTS: No pain, No masses, No nipple discharge  RESPIRATORY: No cough, No wheezing, No chills, No hemoptysis, No shortness of breath at rest or exertional shortness of breath  CARDIOVASCULAR: No chest pain, No palpitations, No dizziness, No CHF, No arrhythmia, No cardiomegaly, No leg swelling  GASTROINTESTINAL: No abdominal, No epigastric pain. No nausea, No vomiting, No hematemesis, No diarrhea, No constipation. No melena, No hematochezia. No GERD  GENITOURINARY: No dysuria, No frequency, No hematuria, No incontinence, No nocturia, No hesitancy,  SKIN: No itching, No burning, No rashes, No lesions   LYMPH NODES: No history of enlarged glands  ENDOCRINE: No heat or cold intolerance, No hair loss. No osteoporosis, No thyroid disease  MUSCULOSKELETAL: No joint pain or swelling, (+) right  extremity pain - right pubic ramus    PSYCHIATRIC: No depression, No anxiety, No mood swings, No difficulty sleeping  HEME/LYMPH: No easy bruising, No anticoagulants, No bleeding disorder, No bleeding gums  ALLERGY AND IMMUNOLOGIC: No hives, No eczema  NEUROLOGICAL: No memory loss, No loss of strength, No numbness, No tremors    Social :  non smoker, non drinker  no recreational drugs    x non contributory    VITALS:   T(C): 36.8 (11-26-20 @ 14:18), Max: 36.8 (11-26-20 @ 14:18)  HR: 73 (11-26-20 @ 15:52) (60 - 73)  BP: 108/73 (11-26-20 @ 15:52) (108/73 - 150/63)  RR: 18 (11-26-20 @ 15:52) (17 - 18)  SpO2: 100% (11-26-20 @ 15:52) (95% - 100%)  Wt(kg): --      PHYSICAL EXAM:  GENERAL: NAD, well nourished and conversant  HEAD:  Atraumatic  EYES: EOM, PERRLA, conjunctiva pink and sclera white  ENT: No tonsillar erythema, exudates, or enlargement, moist mucous membranes, good dentition, no lesions  NECK: Supple, No JVD, normal thyroid, carotids with normal upstrokes and no bruits  CHEST/LUNG: Clear to auscultation bilaterally, No rales, rhonchi, wheezing, or rubs  HEART: Regular rate and rhythm, No murmurs, rubs, or gallops  ABDOMEN: Soft, nondistended, no masses, guarding, tenderness or rebound, bowel sounds present  EXTREMITIES:  2+ Peripheral Pulses, No clubbing, cyanosis, or edema. No arthritis of shoulders, elbows, hands, hips, knees, ankles, or feet. No DJD C spine, T spine, or L/S spine  LYMPH: No lymphadenopathy noted  SKIN: No rashes or lesions  NERVOUS SYSTEM:  Alert & Oriented X3, normal cognitive function. Motor Strength 5/5 right upper and right lower.  5/5 left upper and left lower extremities, DTRs 2+ intact and symmetric    LABS:                          13.3   7.23  )-----------( 226      ( 26 Nov 2020 14:54 )             40.9     11-26    138  |  102  |  20  ----------------------------<  84  4.1   |  24  |  0.64    Ca    9.9      26 Nov 2020 14:54    TPro  6.7  /  Alb  3.8  /  TBili  0.4  /  DBili  x   /  AST  28  /  ALT  24  /  AlkPhos  256<H>  11-26    CAPILLARY BLOOD GLUCOSE          RADIOLOGY & ADDITIONAL TESTS:      Consultant(s):    Care Discussed with Consultants/Other Providers [ ] YES  [ ] NO

## 2020-11-27 LAB
ALBUMIN SERPL ELPH-MCNC: 3.3 G/DL — SIGNIFICANT CHANGE UP (ref 3.3–5)
ALP SERPL-CCNC: 182 U/L — HIGH (ref 40–120)
ALT FLD-CCNC: 17 U/L — SIGNIFICANT CHANGE UP (ref 10–45)
ANION GAP SERPL CALC-SCNC: 10 MMOL/L — SIGNIFICANT CHANGE UP (ref 5–17)
APPEARANCE UR: CLEAR — SIGNIFICANT CHANGE UP
AST SERPL-CCNC: 17 U/L — SIGNIFICANT CHANGE UP (ref 10–40)
BASOPHILS # BLD AUTO: 0.03 K/UL — SIGNIFICANT CHANGE UP (ref 0–0.2)
BASOPHILS NFR BLD AUTO: 0.4 % — SIGNIFICANT CHANGE UP (ref 0–2)
BILIRUB SERPL-MCNC: 0.3 MG/DL — SIGNIFICANT CHANGE UP (ref 0.2–1.2)
BILIRUB UR-MCNC: NEGATIVE — SIGNIFICANT CHANGE UP
BUN SERPL-MCNC: 24 MG/DL — HIGH (ref 7–23)
CALCIUM SERPL-MCNC: 8.9 MG/DL — SIGNIFICANT CHANGE UP (ref 8.4–10.5)
CHLORIDE SERPL-SCNC: 108 MMOL/L — SIGNIFICANT CHANGE UP (ref 96–108)
CO2 SERPL-SCNC: 23 MMOL/L — SIGNIFICANT CHANGE UP (ref 22–31)
COLOR SPEC: YELLOW — SIGNIFICANT CHANGE UP
CREAT SERPL-MCNC: 0.82 MG/DL — SIGNIFICANT CHANGE UP (ref 0.5–1.3)
DIFF PNL FLD: NEGATIVE — SIGNIFICANT CHANGE UP
EOSINOPHIL # BLD AUTO: 0.04 K/UL — SIGNIFICANT CHANGE UP (ref 0–0.5)
EOSINOPHIL NFR BLD AUTO: 0.5 % — SIGNIFICANT CHANGE UP (ref 0–6)
ERYTHROCYTE [SEDIMENTATION RATE] IN BLOOD: 9 MM/HR — SIGNIFICANT CHANGE UP (ref 0–20)
GLUCOSE BLDC GLUCOMTR-MCNC: 120 MG/DL — HIGH (ref 70–99)
GLUCOSE SERPL-MCNC: 120 MG/DL — HIGH (ref 70–99)
GLUCOSE UR QL: ABNORMAL
HCT VFR BLD CALC: 29.6 % — LOW (ref 34.5–45)
HGB BLD-MCNC: 9.6 G/DL — LOW (ref 11.5–15.5)
IMM GRANULOCYTES NFR BLD AUTO: 0.4 % — SIGNIFICANT CHANGE UP (ref 0–1.5)
KETONES UR-MCNC: NEGATIVE — SIGNIFICANT CHANGE UP
LEUKOCYTE ESTERASE UR-ACNC: ABNORMAL
LYMPHOCYTES # BLD AUTO: 0.89 K/UL — LOW (ref 1–3.3)
LYMPHOCYTES # BLD AUTO: 12.1 % — LOW (ref 13–44)
MCHC RBC-ENTMCNC: 32.3 PG — SIGNIFICANT CHANGE UP (ref 27–34)
MCHC RBC-ENTMCNC: 32.4 GM/DL — SIGNIFICANT CHANGE UP (ref 32–36)
MCV RBC AUTO: 99.7 FL — SIGNIFICANT CHANGE UP (ref 80–100)
MONOCYTES # BLD AUTO: 0.69 K/UL — SIGNIFICANT CHANGE UP (ref 0–0.9)
MONOCYTES NFR BLD AUTO: 9.4 % — SIGNIFICANT CHANGE UP (ref 2–14)
NEUTROPHILS # BLD AUTO: 5.68 K/UL — SIGNIFICANT CHANGE UP (ref 1.8–7.4)
NEUTROPHILS NFR BLD AUTO: 77.2 % — HIGH (ref 43–77)
NITRITE UR-MCNC: POSITIVE
NRBC # BLD: 0 /100 WBCS — SIGNIFICANT CHANGE UP (ref 0–0)
PH UR: 6 — SIGNIFICANT CHANGE UP (ref 5–8)
PLATELET # BLD AUTO: 176 K/UL — SIGNIFICANT CHANGE UP (ref 150–400)
POTASSIUM SERPL-MCNC: 4.3 MMOL/L — SIGNIFICANT CHANGE UP (ref 3.5–5.3)
POTASSIUM SERPL-SCNC: 4.3 MMOL/L — SIGNIFICANT CHANGE UP (ref 3.5–5.3)
PROT SERPL-MCNC: 5.3 G/DL — LOW (ref 6–8.3)
PROT UR-MCNC: NEGATIVE — SIGNIFICANT CHANGE UP
RBC # BLD: 2.97 M/UL — LOW (ref 3.8–5.2)
RBC # FLD: 13.5 % — SIGNIFICANT CHANGE UP (ref 10.3–14.5)
SARS-COV-2 IGG SERPL QL IA: NEGATIVE — SIGNIFICANT CHANGE UP
SARS-COV-2 IGM SERPL IA-ACNC: 0.22 RATIO — SIGNIFICANT CHANGE UP
SODIUM SERPL-SCNC: 141 MMOL/L — SIGNIFICANT CHANGE UP (ref 135–145)
SP GR SPEC: 1.02 — SIGNIFICANT CHANGE UP (ref 1.01–1.02)
UROBILINOGEN FLD QL: NEGATIVE — SIGNIFICANT CHANGE UP
WBC # BLD: 7.36 K/UL — SIGNIFICANT CHANGE UP (ref 3.8–10.5)
WBC # FLD AUTO: 7.36 K/UL — SIGNIFICANT CHANGE UP (ref 3.8–10.5)

## 2020-11-27 RX ADMIN — Medication 20 MILLIGRAM(S): at 05:35

## 2020-11-27 RX ADMIN — AMLODIPINE BESYLATE 2.5 MILLIGRAM(S): 2.5 TABLET ORAL at 05:35

## 2020-11-27 RX ADMIN — Medication 1000 UNIT(S): at 17:56

## 2020-11-27 RX ADMIN — Medication 81 MILLIGRAM(S): at 10:57

## 2020-11-27 RX ADMIN — SENNA PLUS 2 TABLET(S): 8.6 TABLET ORAL at 21:23

## 2020-11-27 RX ADMIN — ATORVASTATIN CALCIUM 40 MILLIGRAM(S): 80 TABLET, FILM COATED ORAL at 21:22

## 2020-11-27 RX ADMIN — Medication 400 MILLIGRAM(S): at 11:32

## 2020-11-27 RX ADMIN — DEXTROSE MONOHYDRATE, SODIUM CHLORIDE, AND POTASSIUM CHLORIDE 75 MILLILITER(S): 50; .745; 4.5 INJECTION, SOLUTION INTRAVENOUS at 06:41

## 2020-11-27 RX ADMIN — DEXTROSE MONOHYDRATE, SODIUM CHLORIDE, AND POTASSIUM CHLORIDE 75 MILLILITER(S): 50; .745; 4.5 INJECTION, SOLUTION INTRAVENOUS at 10:56

## 2020-11-27 RX ADMIN — Medication 1000 UNIT(S): at 05:35

## 2020-11-27 RX ADMIN — Medication 1000 MILLIGRAM(S): at 01:20

## 2020-11-27 NOTE — PROGRESS NOTE ADULT - ASSESSMENT
88 F w/ pmh of dementia, presents to the ER w/ fall. w/u shows that she has sustained an inferior pubic ramus facture and is admitted for pain control

## 2020-11-27 NOTE — PHYSICAL THERAPY INITIAL EVALUATION ADULT - ADDITIONAL COMMENTS
Pt lives with her son and his family in a private house with 5 steps to enter+ handrail and pt resides on main floor.

## 2020-11-27 NOTE — PROGRESS NOTE ADULT - SUBJECTIVE AND OBJECTIVE BOX
88 F w/ pmh of ?dementia, is aaox2 (knows birthday, location and self- doesn't know today is thanksgiving) presents to the ER w/ fall ?unknown if hit head pt states "why am I here" presents to the ER w/ R hip pain. tenderness when palpating the hip.  w/u shows that she has sustained a pubic ramus facture and is admitted for pain control  She was given narcotic pain med and was difficult to arouses  Will use IV Tylenol for pain control. Patient OOB sitting in chair. Pain seems to be well controlled.       MEDICATIONS  (STANDING):  amLODIPine   Tablet 2.5 milliGRAM(s) Oral daily  aspirin enteric coated 81 milliGRAM(s) Oral daily  atorvastatin 40 milliGRAM(s) Oral at bedtime  cholecalciferol 1000 Unit(s) Oral two times a day  dextrose 5% + sodium chloride 0.9% with potassium chloride 20 mEq/L 1000 milliLiter(s) (75 mL/Hr) IV Continuous <Continuous>  furosemide    Tablet 20 milliGRAM(s) Oral daily  senna 2 Tablet(s) Oral at bedtime    MEDICATIONS  (PRN):  acetaminophen  IVPB .. 1000 milliGRAM(s) IV Intermittent every 6 hours PRN Moderate Pain (4 - 6)          VITALS:   T(C): 36.8 (20 @ 11:34), Max: 37.1 (20 @ 05:37)  HR: 72 (20 @ 11:34) (64 - 92)  BP: 112/60 (20 @ 11:34) (107/64 - 155/65)  RR: 18 (20 @ 11:34) (18 - 18)  SpO2: 94% (20 @ 11:34) (93% - 96%)  Wt(kg): --      PHYSICAL EXAM:  GENERAL: NAD, well nourished and conversant  HEAD:  Atraumatic  EYES: EOM, PERRLA, conjunctiva pink and sclera white  ENT: No tonsillar erythema, exudates, or enlargement, moist mucous membranes, good dentition, no lesions  NECK: Supple, No JVD, normal thyroid, carotids with normal upstrokes and no bruits  CHEST/LUNG: Clear to auscultation bilaterally, No rales, rhonchi, wheezing, or rubs  HEART: Regular rate and rhythm, No murmurs, rubs, or gallops  ABDOMEN: Soft, nondistended, no masses, guarding, tenderness or rebound, bowel sounds present  EXTREMITIES:  2+ Peripheral Pulses, No clubbing, cyanosis, or edema. No arthritis of shoulders, elbows, hands, hips, knees, ankles, or feet. No DJD C spine, T spine, or L/S spine  LYMPH: No lymphadenopathy noted  SKIN: No rashes or lesions  NERVOUS SYSTEM:  confused at times. Motor Strength 5/5 right upper and right lower.  5/5 left upper and left lower extremities, DTRs 2+ intact and symmetric  LABS:        CBC Full  -  ( 2020 06:45 )  WBC Count : 7.36 K/uL  RBC Count : 2.97 M/uL  Hemoglobin : 9.6 g/dL  Hematocrit : 29.6 %  Platelet Count - Automated : 176 K/uL  Mean Cell Volume : 99.7 fl  Mean Cell Hemoglobin : 32.3 pg  Mean Cell Hemoglobin Concentration : 32.4 gm/dL  Auto Neutrophil # : 5.68 K/uL  Auto Lymphocyte # : 0.89 K/uL  Auto Monocyte # : 0.69 K/uL  Auto Eosinophil # : 0.04 K/uL  Auto Basophil # : 0.03 K/uL  Auto Neutrophil % : 77.2 %  Auto Lymphocyte % : 12.1 %  Auto Monocyte % : 9.4 %  Auto Eosinophil % : 0.5 %  Auto Basophil % : 0.4 %        141  |  108  |  24<H>  ----------------------------<  120<H>  4.3   |  23  |  0.82    Ca    8.9      2020 06:45    TPro  5.3<L>  /  Alb  3.3  /  TBili  0.3  /  DBili  x   /  AST  17  /  ALT  17  /  AlkPhos  182<H>      LIVER FUNCTIONS - ( 2020 06:45 )  Alb: 3.3 g/dL / Pro: 5.3 g/dL / ALK PHOS: 182 U/L / ALT: 17 U/L / AST: 17 U/L / GGT: x           PT/INR - ( 2020 14:54 )   PT: 11.8 sec;   INR: 0.98 ratio         PTT - ( 2020 14:54 )  PTT:30.8 sec  Urinalysis Basic - ( 2020 07:55 )    Color: Yellow / Appearance: Clear / S.019 / pH: x  Gluc: x / Ketone: Negative  / Bili: Negative / Urobili: Negative   Blood: x / Protein: Negative / Nitrite: Positive   Leuk Esterase: Large / RBC: 7 /hpf / WBC 27 /HPF   Sq Epi: x / Non Sq Epi: 1 / Bacteria: Moderate      CAPILLARY BLOOD GLUCOSE          RADIOLOGY & ADDITIONAL TESTS:

## 2020-11-27 NOTE — PHYSICAL THERAPY INITIAL EVALUATION ADULT - GAIT DEVIATIONS NOTED, PT EVAL
increased time in double stance/decreased [place]/decreased step length/decreased stride length/decreased weight-shifting ability

## 2020-11-27 NOTE — PHYSICAL THERAPY INITIAL EVALUATION ADULT - ACTIVE RANGE OF MOTION EXAMINATION, REHAB EVAL
bilateral upper extremity Active ROM was WFL (within functional limits)/R hip flexion limited by pain/bilateral  lower extremity Active ROM was WFL (within functional limits)

## 2020-11-27 NOTE — PHYSICAL THERAPY INITIAL EVALUATION ADULT - PERTINENT HX OF CURRENT PROBLEM, REHAB EVAL
88 F w/ pmh of ?dementia, is aaox2 (knows birthday, location and self- doesn't know today is thanksgiving) presents to the ER w/ fall ?unknown if hit head pt states "why am I here" presents to the ER w/ R hip pain. tenderness when palpating the hip.  w/u shows that she has sustained an inferior pubic ramus facture and is admitted for pain control. Ortho consult: WBAT. 88 F w/ pmh of ?dementia, is aaox2 (knows birthday, location and self- doesn't know today is thanksgiving) presents to the ER w/ fall ?unknown if hit head pt states "why am I here" presents to the ER w/ R hip pain. tenderness when palpating the hip.  w/u shows that she has sustained a superior and  inferior pubic ramus facture and is admitted for pain control. Ortho consult: WBAT.

## 2020-11-27 NOTE — PHYSICAL THERAPY INITIAL EVALUATION ADULT - PLANNED THERAPY INTERVENTIONS, PT EVAL
Stairs Goal: Pt will go up/down 4 steps with + handrail independently in 2 weeks./gait training/strengthening/balance training/bed mobility training/transfer training

## 2020-11-28 RX ADMIN — SENNA PLUS 2 TABLET(S): 8.6 TABLET ORAL at 22:11

## 2020-11-28 RX ADMIN — Medication 1000 MILLIGRAM(S): at 17:00

## 2020-11-28 RX ADMIN — Medication 1000 UNIT(S): at 17:55

## 2020-11-28 RX ADMIN — Medication 1000 UNIT(S): at 05:55

## 2020-11-28 RX ADMIN — Medication 400 MILLIGRAM(S): at 16:44

## 2020-11-28 RX ADMIN — Medication 81 MILLIGRAM(S): at 11:28

## 2020-11-28 RX ADMIN — Medication 20 MILLIGRAM(S): at 05:55

## 2020-11-28 RX ADMIN — ATORVASTATIN CALCIUM 40 MILLIGRAM(S): 80 TABLET, FILM COATED ORAL at 22:11

## 2020-11-28 RX ADMIN — AMLODIPINE BESYLATE 2.5 MILLIGRAM(S): 2.5 TABLET ORAL at 05:55

## 2020-11-28 NOTE — PROGRESS NOTE ADULT - PROBLEM SELECTOR PLAN 1
Right inferioir pubic ramus fracture  will continue pT as tolerated  Pain control  use IV tylenol as she became extremely lethargic on one dose of narcotic pain med

## 2020-11-28 NOTE — PROGRESS NOTE ADULT - SUBJECTIVE AND OBJECTIVE BOX
88 F w/ pmh of ?dementia, is aaox2 (knows birthday, location and self- doesn't know today is thanksgiving) presents to the ER w/ fall ?unknown if hit head pt states "why am I here" presents to the ER w/ R hip pain. tenderness when palpating the hip.  w/u shows that she has sustained a pubic ramus facture and is admitted for pain control  She was given narcotic pain med and was difficult to arouses  Will use IV Tylenol for pain control. Patient OOB sitting in chair. Pain seems to be well controlled.  Patient has been eating well.     MEDICATIONS  (STANDING):  amLODIPine   Tablet 2.5 milliGRAM(s) Oral daily  aspirin enteric coated 81 milliGRAM(s) Oral daily  atorvastatin 40 milliGRAM(s) Oral at bedtime  cholecalciferol 1000 Unit(s) Oral two times a day  dextrose 5% + sodium chloride 0.9% with potassium chloride 20 mEq/L 1000 milliLiter(s) (75 mL/Hr) IV Continuous <Continuous>  furosemide    Tablet 20 milliGRAM(s) Oral daily  senna 2 Tablet(s) Oral at bedtime    MEDICATIONS  (PRN):  acetaminophen  IVPB .. 1000 milliGRAM(s) IV Intermittent every 6 hours PRN Moderate Pain (4 - 6)          VITALS:   T(C): 36.9 (20 @ 12:58), Max: 36.9 (20 @ 12:58)  HR: 66 (20 @ 12:58) (66 - 71)  BP: 103/55 (20 @ 12:58) (103/55 - 147/66)  RR: 18 (20 @ 12:58) (18 - 18)  SpO2: 95% (20 @ 12:58) (95% - 98%)  Wt(kg): --    PHYSICAL EXAM:  GENERAL: NAD, well nourished and conversant  HEAD:  Atraumatic  EYES: EOM, PERRLA, conjunctiva pink and sclera white  ENT: No tonsillar erythema, exudates, or enlargement, moist mucous membranes, good dentition, no lesions  NECK: Supple, No JVD, normal thyroid, carotids with normal upstrokes and no bruits  CHEST/LUNG: Clear to auscultation bilaterally, No rales, rhonchi, wheezing, or rubs  HEART: Regular rate and rhythm, No murmurs, rubs, or gallops  ABDOMEN: Soft, nondistended, no masses, guarding, tenderness or rebound, bowel sounds present  EXTREMITIES:  2+ Peripheral Pulses, No clubbing, cyanosis, or edema. No arthritis of shoulders, elbows, hands, hips, knees, ankles, or feet. No DJD C spine, T spine, or L/S spine  LYMPH: No lymphadenopathy noted  SKIN: No rashes or lesions  NERVOUS SYSTEM:  confused at times. Motor Strength 5/5 right upper and right lower.  5/5 left upper and left lower extremities, DTRs 2+ intact and symmetric    LABS:        CBC Full  -  ( 2020 06:45 )  WBC Count : 7.36 K/uL  RBC Count : 2.97 M/uL  Hemoglobin : 9.6 g/dL  Hematocrit : 29.6 %  Platelet Count - Automated : 176 K/uL  Mean Cell Volume : 99.7 fl  Mean Cell Hemoglobin : 32.3 pg  Mean Cell Hemoglobin Concentration : 32.4 gm/dL  Auto Neutrophil # : 5.68 K/uL  Auto Lymphocyte # : 0.89 K/uL  Auto Monocyte # : 0.69 K/uL  Auto Eosinophil # : 0.04 K/uL  Auto Basophil # : 0.03 K/uL  Auto Neutrophil % : 77.2 %  Auto Lymphocyte % : 12.1 %  Auto Monocyte % : 9.4 %  Auto Eosinophil % : 0.5 %  Auto Basophil % : 0.4 %        141  |  108  |  24<H>  ----------------------------<  120<H>  4.3   |  23  |  0.82    Ca    8.9      2020 06:45    TPro  5.3<L>  /  Alb  3.3  /  TBili  0.3  /  DBili  x   /  AST  17  /  ALT  17  /  AlkPhos  182<H>      LIVER FUNCTIONS - ( 2020 06:45 )  Alb: 3.3 g/dL / Pro: 5.3 g/dL / ALK PHOS: 182 U/L / ALT: 17 U/L / AST: 17 U/L / GGT: x             Urinalysis Basic - ( 2020 07:55 )    Color: Yellow / Appearance: Clear / S.019 / pH: x  Gluc: x / Ketone: Negative  / Bili: Negative / Urobili: Negative   Blood: x / Protein: Negative / Nitrite: Positive   Leuk Esterase: Large / RBC: 7 /hpf / WBC 27 /HPF   Sq Epi: x / Non Sq Epi: 1 / Bacteria: Moderate      CAPILLARY BLOOD GLUCOSE          RADIOLOGY & ADDITIONAL TESTS:

## 2020-11-29 LAB
CULTURE RESULTS: SIGNIFICANT CHANGE UP
SARS-COV-2 RNA SPEC QL NAA+PROBE: SIGNIFICANT CHANGE UP
SPECIMEN SOURCE: SIGNIFICANT CHANGE UP

## 2020-11-29 RX ORDER — DONEPEZIL HYDROCHLORIDE 10 MG/1
10 TABLET, FILM COATED ORAL AT BEDTIME
Refills: 0 | Status: DISCONTINUED | OUTPATIENT
Start: 2020-11-29 | End: 2020-12-04

## 2020-11-29 RX ORDER — FUROSEMIDE 40 MG
20 TABLET ORAL
Refills: 0 | Status: DISCONTINUED | OUTPATIENT
Start: 2020-12-01 | End: 2020-12-04

## 2020-11-29 RX ADMIN — Medication 20 MILLIGRAM(S): at 05:45

## 2020-11-29 RX ADMIN — Medication 1000 MILLIGRAM(S): at 20:10

## 2020-11-29 RX ADMIN — DONEPEZIL HYDROCHLORIDE 10 MILLIGRAM(S): 10 TABLET, FILM COATED ORAL at 21:53

## 2020-11-29 RX ADMIN — Medication 1000 MILLIGRAM(S): at 11:28

## 2020-11-29 RX ADMIN — Medication 81 MILLIGRAM(S): at 12:21

## 2020-11-29 RX ADMIN — Medication 400 MILLIGRAM(S): at 10:37

## 2020-11-29 RX ADMIN — ATORVASTATIN CALCIUM 40 MILLIGRAM(S): 80 TABLET, FILM COATED ORAL at 21:53

## 2020-11-29 RX ADMIN — Medication 1000 UNIT(S): at 05:45

## 2020-11-29 RX ADMIN — Medication 1000 UNIT(S): at 17:32

## 2020-11-29 RX ADMIN — AMLODIPINE BESYLATE 2.5 MILLIGRAM(S): 2.5 TABLET ORAL at 05:45

## 2020-11-29 RX ADMIN — Medication 400 MILLIGRAM(S): at 19:21

## 2020-11-29 RX ADMIN — SENNA PLUS 2 TABLET(S): 8.6 TABLET ORAL at 21:53

## 2020-11-29 NOTE — PROGRESS NOTE ADULT - SUBJECTIVE AND OBJECTIVE BOX
88 F w/ pmh of ?dementia, is aaox2 (knows birthday, location and self- doesn't know today is thanksgiving) presents to the ER w/ fall ?unknown if hit head pt states "why am I here" presents to the ER w/ R hip pain. tenderness when palpating the hip.  w/u shows that she has sustained a pubic ramus facture and is admitted for pain control  She was given narcotic pain med and was difficult to arouses  Will use IV Tylenol for pain control. Patient OOB sitting in chair. Pain seems to be well controlled.    MEDICATIONS  (STANDING):  amLODIPine   Tablet 2.5 milliGRAM(s) Oral daily  aspirin enteric coated 81 milliGRAM(s) Oral daily  atorvastatin 40 milliGRAM(s) Oral at bedtime  cholecalciferol 1000 Unit(s) Oral two times a day  furosemide    Tablet 20 milliGRAM(s) Oral daily  senna 2 Tablet(s) Oral at bedtime    MEDICATIONS  (PRN):  acetaminophen  IVPB .. 1000 milliGRAM(s) IV Intermittent every 6 hours PRN Moderate Pain (4 - 6)          VITALS:   T(C): 36.4 (11-29-20 @ 12:05), Max: 37.1 (11-28-20 @ 19:33)  HR: 60 (11-29-20 @ 12:05) (60 - 65)  BP: 95/59 (11-29-20 @ 12:05) (95/59 - 144/72)  RR: 18 (11-29-20 @ 12:05) (18 - 18)  SpO2: 97% (11-29-20 @ 12:05) (94% - 98%)  Wt(kg): --    PHYSICAL EXAM:  GENERAL: NAD, well nourished and conversant  HEAD:  Atraumatic  EYES: EOM, PERRLA, conjunctiva pink and sclera white  ENT: No tonsillar erythema, exudates, or enlargement, moist mucous membranes, good dentition, no lesions  NECK: Supple, No JVD, normal thyroid, carotids with normal upstrokes and no bruits  CHEST/LUNG: Clear to auscultation bilaterally, No rales, rhonchi, wheezing, or rubs  HEART: Regular rate and rhythm, No murmurs, rubs, or gallops  ABDOMEN: Soft, nondistended, no masses, guarding, tenderness or rebound, bowel sounds present  EXTREMITIES:  2+ Peripheral Pulses, No clubbing, cyanosis, or edema. No arthritis of shoulders, elbows, hands, hips, knees, ankles, or feet. No DJD C spine, T spine, or L/S spine  LYMPH: No lymphadenopathy noted  SKIN: No rashes or lesions  NERVOUS SYSTEM:  confused at times. Motor Strength 5/5 right upper and right lower.  5/5 left upper and left lower extremities, DTRs 2+ intact and symmetric      LABS:                      CAPILLARY BLOOD GLUCOSE          RADIOLOGY & ADDITIONAL TESTS:

## 2020-11-29 NOTE — PROGRESS NOTE ADULT - PROBLEM SELECTOR PLAN 1
Right inferioir pubic ramus fracture  will continue physical therapy  as tolerated  Pain control  use IV tylenol as she became extremely lethargic on one dose of narcotic pain med

## 2020-11-30 ENCOUNTER — TRANSCRIPTION ENCOUNTER (OUTPATIENT)
Age: 85
End: 2020-11-30

## 2020-11-30 DIAGNOSIS — N39.0 URINARY TRACT INFECTION, SITE NOT SPECIFIED: ICD-10-CM

## 2020-11-30 LAB
ALBUMIN SERPL ELPH-MCNC: 3.2 G/DL — LOW (ref 3.3–5)
ALP SERPL-CCNC: 175 U/L — HIGH (ref 40–120)
ALT FLD-CCNC: 17 U/L — SIGNIFICANT CHANGE UP (ref 10–45)
ANION GAP SERPL CALC-SCNC: 10 MMOL/L — SIGNIFICANT CHANGE UP (ref 5–17)
AST SERPL-CCNC: 17 U/L — SIGNIFICANT CHANGE UP (ref 10–40)
BILIRUB SERPL-MCNC: 0.5 MG/DL — SIGNIFICANT CHANGE UP (ref 0.2–1.2)
BUN SERPL-MCNC: 27 MG/DL — HIGH (ref 7–23)
CALCIUM SERPL-MCNC: 9.2 MG/DL — SIGNIFICANT CHANGE UP (ref 8.4–10.5)
CHLORIDE SERPL-SCNC: 105 MMOL/L — SIGNIFICANT CHANGE UP (ref 96–108)
CO2 SERPL-SCNC: 24 MMOL/L — SIGNIFICANT CHANGE UP (ref 22–31)
CREAT SERPL-MCNC: 0.89 MG/DL — SIGNIFICANT CHANGE UP (ref 0.5–1.3)
GLUCOSE SERPL-MCNC: 90 MG/DL — SIGNIFICANT CHANGE UP (ref 70–99)
HCT VFR BLD CALC: 31.4 % — LOW (ref 34.5–45)
HGB BLD-MCNC: 10 G/DL — LOW (ref 11.5–15.5)
MCHC RBC-ENTMCNC: 31.6 PG — SIGNIFICANT CHANGE UP (ref 27–34)
MCHC RBC-ENTMCNC: 31.8 GM/DL — LOW (ref 32–36)
MCV RBC AUTO: 99.4 FL — SIGNIFICANT CHANGE UP (ref 80–100)
NRBC # BLD: 0 /100 WBCS — SIGNIFICANT CHANGE UP (ref 0–0)
PLATELET # BLD AUTO: 183 K/UL — SIGNIFICANT CHANGE UP (ref 150–400)
POTASSIUM SERPL-MCNC: 3.9 MMOL/L — SIGNIFICANT CHANGE UP (ref 3.5–5.3)
POTASSIUM SERPL-SCNC: 3.9 MMOL/L — SIGNIFICANT CHANGE UP (ref 3.5–5.3)
PROT SERPL-MCNC: 5.7 G/DL — LOW (ref 6–8.3)
RBC # BLD: 3.16 M/UL — LOW (ref 3.8–5.2)
RBC # FLD: 13.4 % — SIGNIFICANT CHANGE UP (ref 10.3–14.5)
SODIUM SERPL-SCNC: 139 MMOL/L — SIGNIFICANT CHANGE UP (ref 135–145)
WBC # BLD: 6.58 K/UL — SIGNIFICANT CHANGE UP (ref 3.8–10.5)
WBC # FLD AUTO: 6.58 K/UL — SIGNIFICANT CHANGE UP (ref 3.8–10.5)

## 2020-11-30 RX ADMIN — ATORVASTATIN CALCIUM 40 MILLIGRAM(S): 80 TABLET, FILM COATED ORAL at 22:03

## 2020-11-30 RX ADMIN — SENNA PLUS 2 TABLET(S): 8.6 TABLET ORAL at 22:03

## 2020-11-30 RX ADMIN — Medication 1000 UNIT(S): at 19:03

## 2020-11-30 RX ADMIN — DONEPEZIL HYDROCHLORIDE 10 MILLIGRAM(S): 10 TABLET, FILM COATED ORAL at 22:03

## 2020-11-30 RX ADMIN — Medication 81 MILLIGRAM(S): at 11:23

## 2020-11-30 RX ADMIN — AMLODIPINE BESYLATE 2.5 MILLIGRAM(S): 2.5 TABLET ORAL at 05:28

## 2020-11-30 RX ADMIN — Medication 1000 UNIT(S): at 05:28

## 2020-11-30 NOTE — PROGRESS NOTE ADULT - PROBLEM SELECTOR PLAN 1
Right inferior pubic ramus fracture  will continue physical therapy  as tolerated  Pain control  use IV tylenol as she became extremely lethargic on one dose of narcotic pain med

## 2020-11-30 NOTE — DISCHARGE NOTE NURSING/CASE MANAGEMENT/SOCIAL WORK - PATIENT PORTAL LINK FT
You can access the FollowMyHealth Patient Portal offered by [facility] by registering at the following website: [email or web address]. By joining [employer]’s FollowMyHealth portal, you will also be able to view your health information using other applications (apps) compatible with our system.

## 2020-11-30 NOTE — PROGRESS NOTE ADULT - PROBLEM SELECTOR PLAN 5
UA was positive  cultures were inconclusive   will repeat cultures and treat accordingly  Patient is asymptomatic

## 2020-11-30 NOTE — PROGRESS NOTE ADULT - SUBJECTIVE AND OBJECTIVE BOX
88 F w/ pmh of ?dementia, is aaox2 (knows birthday, location and self- doesn't know today is thanksgiving) presents to the ER w/ fall ?unknown if hit head pt states "why am I here" presents to the ER w/ R hip pain. tenderness when palpating the hip.  w/u shows that she has sustained a pubic ramus facture and is admitted for pain control  She was given narcotic pain med and was difficult to arouses  Will use IV Tylenol for pain control. Patient OOB sitting in chair. Pain seems to be well controlled. Tolerating PO well      MEDICATIONS  (STANDING):  amLODIPine   Tablet 2.5 milliGRAM(s) Oral daily  aspirin enteric coated 81 milliGRAM(s) Oral daily  atorvastatin 40 milliGRAM(s) Oral at bedtime  cholecalciferol 1000 Unit(s) Oral two times a day  donepezil 10 milliGRAM(s) Oral at bedtime  senna 2 Tablet(s) Oral at bedtime    MEDICATIONS  (PRN):          VITALS:   T(C): 36.9 (11-30-20 @ 11:30), Max: 36.9 (11-30-20 @ 11:30)  HR: 63 (11-30-20 @ 11:30) (63 - 64)  BP: 123/97 (11-30-20 @ 11:30) (101/61 - 123/97)  RR: 18 (11-30-20 @ 11:30) (18 - 18)  SpO2: 97% (11-30-20 @ 11:30) (95% - 98%)  Wt(kg): --    PHYSICAL EXAM:  GENERAL: NAD, well nourished and conversant  HEAD:  Atraumatic  EYES: EOM, PERRLA, conjunctiva pink and sclera white  ENT: No tonsillar erythema, exudates, or enlargement, moist mucous membranes, good dentition, no lesions  NECK: Supple, No JVD, normal thyroid, carotids with normal upstrokes and no bruits  CHEST/LUNG: Clear to auscultation bilaterally, No rales, rhonchi, wheezing, or rubs  HEART: Regular rate and rhythm, No murmurs, rubs, or gallops  ABDOMEN: Soft, nondistended, no masses, guarding, tenderness or rebound, bowel sounds present  EXTREMITIES:  2+ Peripheral Pulses, No clubbing, cyanosis, or edema. No arthritis of shoulders, elbows, hands, hips, knees, ankles, or feet. No DJD C spine, T spine, or L/S spine  LYMPH: No lymphadenopathy noted  SKIN: No rashes or lesions  NERVOUS SYSTEM:  confused at times. Motor Strength 5/5 right upper and right lower.  5/5 left upper and left lower extremities, DTRs 2+ intact and symmetric    LABS:        CBC Full  -  ( 30 Nov 2020 10:05 )  WBC Count : 6.58 K/uL  RBC Count : 3.16 M/uL  Hemoglobin : 10.0 g/dL  Hematocrit : 31.4 %  Platelet Count - Automated : 183 K/uL  Mean Cell Volume : 99.4 fl  Mean Cell Hemoglobin : 31.6 pg  Mean Cell Hemoglobin Concentration : 31.8 gm/dL  Auto Neutrophil # : x  Auto Lymphocyte # : x  Auto Monocyte # : x  Auto Eosinophil # : x  Auto Basophil # : x  Auto Neutrophil % : x  Auto Lymphocyte % : x  Auto Monocyte % : x  Auto Eosinophil % : x  Auto Basophil % : x    11-30    139  |  105  |  27<H>  ----------------------------<  90  3.9   |  24  |  0.89    Ca    9.2      30 Nov 2020 10:05    TPro  5.7<L>  /  Alb  3.2<L>  /  TBili  0.5  /  DBili  x   /  AST  17  /  ALT  17  /  AlkPhos  175<H>  11-30    LIVER FUNCTIONS - ( 30 Nov 2020 10:05 )  Alb: 3.2 g/dL / Pro: 5.7 g/dL / ALK PHOS: 175 U/L / ALT: 17 U/L / AST: 17 U/L / GGT: x               CAPILLARY BLOOD GLUCOSE          RADIOLOGY & ADDITIONAL TESTS:

## 2020-12-01 ENCOUNTER — TRANSCRIPTION ENCOUNTER (OUTPATIENT)
Age: 85
End: 2020-12-01

## 2020-12-01 LAB
CULTURE RESULTS: SIGNIFICANT CHANGE UP
SPECIMEN SOURCE: SIGNIFICANT CHANGE UP

## 2020-12-01 RX ORDER — ACETAMINOPHEN 500 MG
650 TABLET ORAL ONCE
Refills: 0 | Status: COMPLETED | OUTPATIENT
Start: 2020-12-01 | End: 2020-12-01

## 2020-12-01 RX ADMIN — SENNA PLUS 2 TABLET(S): 8.6 TABLET ORAL at 21:26

## 2020-12-01 RX ADMIN — Medication 81 MILLIGRAM(S): at 12:17

## 2020-12-01 RX ADMIN — Medication 650 MILLIGRAM(S): at 17:40

## 2020-12-01 RX ADMIN — Medication 650 MILLIGRAM(S): at 16:00

## 2020-12-01 RX ADMIN — ATORVASTATIN CALCIUM 40 MILLIGRAM(S): 80 TABLET, FILM COATED ORAL at 21:26

## 2020-12-01 RX ADMIN — Medication 1000 UNIT(S): at 17:44

## 2020-12-01 RX ADMIN — Medication 1000 UNIT(S): at 06:15

## 2020-12-01 RX ADMIN — DONEPEZIL HYDROCHLORIDE 10 MILLIGRAM(S): 10 TABLET, FILM COATED ORAL at 21:26

## 2020-12-01 RX ADMIN — Medication 20 MILLIGRAM(S): at 06:15

## 2020-12-01 RX ADMIN — AMLODIPINE BESYLATE 2.5 MILLIGRAM(S): 2.5 TABLET ORAL at 06:15

## 2020-12-01 NOTE — DISCHARGE NOTE PROVIDER - CARE PROVIDER_API CALL
Agustin Garcia)  Internal Medicine  [address] [place] [address]  Phone: [phone]  Fax: [phone]  Follow Up Time:    Agustin Garcia)  Internal Medicine  [address] [place] [address]  Phone: [phone]  Fax: [phone]  Follow Up Time:     Ernie Forrest)  Orthopaedic Surgery  [address] [place] [address], [address]  [place] [address]  Phone: [phone]  Fax: [phone]  Follow Up Time:

## 2020-12-01 NOTE — DISCHARGE NOTE PROVIDER - NSDCCPCAREPLAN_GEN_ALL_CORE_FT
PRINCIPAL DISCHARGE DIAGNOSIS  Diagnosis: Pelvis fracture  Assessment and Plan of Treatment: Pelvis fracture; Right inferior pubic ramus fracture  will continue physical therapy  as tolerated  Pain has been well controlled.      SECONDARY DISCHARGE DIAGNOSES  Diagnosis: Dementia  Assessment and Plan of Treatment: Dementia; Haldol prn  follow for oversedation.    Diagnosis: Hypertension  Assessment and Plan of Treatment: Hypertension; Continue current med and low sodium diet  continue amlodipine.    Diagnosis: Hyperlipidemia  Assessment and Plan of Treatment: Hyperlipidemia; Continue present meds and low cholesterol diet.    Diagnosis: UTI (urinary tract infection)  Assessment and Plan of Treatment: UTI (urinary tract infection); UA was positive  awaiting repeat cultures and will DC after cultures resulted.     PRINCIPAL DISCHARGE DIAGNOSIS  Diagnosis: Pelvis fracture  Assessment and Plan of Treatment: Pelvis fracture; Right inferior pubic ramus fracture  will continue physical therapy  as tolerated  Pain has been well controlled.  Follow up with ortho      SECONDARY DISCHARGE DIAGNOSES  Diagnosis: Hyperlipidemia  Assessment and Plan of Treatment: Hyperlipidemia; Continue present meds and low cholesterol diet.    Diagnosis: Hypertension  Assessment and Plan of Treatment: Hypertension; Continue current med and low sodium diet  continue amlodipine.    Diagnosis: Dementia  Assessment and Plan of Treatment: Dementia; Haldol prn  follow for oversedation.     PRINCIPAL DISCHARGE DIAGNOSIS  Diagnosis: Pelvis fracture  Assessment and Plan of Treatment: Pelvis fracture; Right inferior pubic ramus fracture  continue physical therapy   Follow up with your orthopediic Dr Forrest      SECONDARY DISCHARGE DIAGNOSES  Diagnosis: Hyperlipidemia  Assessment and Plan of Treatment: Hyperlipidemia; Continue present meds and low cholesterol diet.    Diagnosis: Hypertension  Assessment and Plan of Treatment: Hypertension; Continue current med and low sodium diet  continue amlodipine.    Diagnosis: Dementia  Assessment and Plan of Treatment: Dementia; Haldol prn  follow for oversedation.     PRINCIPAL DISCHARGE DIAGNOSIS  Diagnosis: Pelvis fracture  Assessment and Plan of Treatment: Pelvis fracture; Right inferior pubic ramus fracture  continue physical therapy   Weight bearing as tolerated with assistive device as needed  Follow up with your orthopedic doctor, Dr Forrest      SECONDARY DISCHARGE DIAGNOSES  Diagnosis: Hyperlipidemia  Assessment and Plan of Treatment: Hyperlipidemia; Continue present meds and low cholesterol diet.    Diagnosis: Hypertension  Assessment and Plan of Treatment: Hypertension; Continue current med and low sodium diet  continue amlodipine.    Diagnosis: Dementia  Assessment and Plan of Treatment: Continue with aricept

## 2020-12-01 NOTE — PROGRESS NOTE ADULT - PROBLEM SELECTOR PLAN 5
DISPLAY PLAN FREE TEXT UA was positive  awaiting repeat cultures  DC planning after cultures resulted

## 2020-12-01 NOTE — DISCHARGE NOTE PROVIDER - HOSPITAL COURSE
88 F w/ pmh of ?dementia, is aaox2 (knows birthday, location and self- doesn't know date) presents to the ER on 11/26 w/ fall and w/ R hip pain and w/u shows sustained a pubic ramus facture and is admitted for pain control.  She was given narcotic pain med and was difficult to arouse. Will use IV Tylenol for pain control. Patient OOB sitting in chair. Pain seems to be well controlled; tolerating PO and continues to be confused at times.    Pelvis fracture; Right inferior pubic ramus fracture  will continue physical therapy  as tolerated  Pain has been well controlled.     Dementia; Haldol prn  follow for oversedation.     Hypertension; Continue current med and low sodium diet  continue amlodipine.     Hyperlipidemia; Continue present meds and low cholesterol diet.     UTI (urinary tract infection); UA was positive  awaiting repeat cultures and will DC after cultures resulted.      88 F w/ pmh of dementia, is aaox2 (knows birthday, location and self- doesn't know date) presents to the ER on 11/26 w/ fall and w/ R hip pain and w/u shows sustained a pubic ramus facture and is admitted for pain control.  She was given narcotic pain med and was difficult to arouse so use IV Tylenol for pain control.  Pain seems to be well controlled; tolerating PO and continues to be confused at times.  was seen by ortho for  Pelvis fracture; Right inferior pubic ramus fracture, no plan for sx, conservative treatment, PT pain control. stable for d/c     Dementia; Haldol prn  follow for oversedation.     Hypertension; Continue current med and low sodium diet  continue amlodipine.     Hyperlipidemia; Continue present meds and low cholesterol diet.     UTI (urinary tract infection); UA was positive  repeat cultures negative, no symptoms       88 F w/ pmh of dementia, is aaox2 (knows birthday, location and self- doesn't know date) presents to the ER on 11/26 w/ fall and w/ R hip pain and w/u shows sustained a pubic ramus facture and is admitted for pain control.  She was given narcotic pain med and was difficult to arouse so use IV Tylenol for pain control.  Pain seems to be well controlled; tolerating PO and continues to be confused at times.  was seen by ortho for  Pelvis fracture; Right inferior pubic ramus fracture, no plan for sx, conservative treatment, PT pain control. stable for d/c     Dementia; Haldol prn  follow for oversedation.     Hypertension; Continue current med and low sodium diet  continue amlodipine.     Hyperlipidemia; Continue present meds and low cholesterol diet.     UTI (urinary tract infection); UA was positive  repeat cultures negative, no symptoms , no plan for treatment at this time      88 F w/ pmh of dementia, is aaox2 (knows birthday, location and self- doesn't know date) presents to the ER on 11/26 w/ fall and w/ R hip pain and w/u shows sustained a pubic ramus facture and is admitted for pain control.  She was given narcotic pain med and was difficult to arouse so use IV Tylenol for pain control.  Pain seems to be well controlled; tolerating PO and continues to be confused at times.  was seen by ortho for  Pelvis fracture; Right inferior pubic ramus fracture, no plan for sx, conservative treatment, PT pain control. Patient noted to have a positive urinalysis - urine culture negative.  Patient cleared for discharge by Dr. Teague, with PMD and orthopedics follow up.

## 2020-12-01 NOTE — DISCHARGE NOTE PROVIDER - PROVIDER TOKENS
PROVIDER:[TOKEN:[[id number]]] PROVIDER:[TOKEN:[[id number]]],PROVIDER:[TOKEN:[[id number]]]

## 2020-12-01 NOTE — DISCHARGE NOTE PROVIDER - NSDCFUADDAPPT_GEN_ALL_CORE_FT
You will need to follow up with your primary medical doctor within one week of discharge - please call to make an appointment.    You will need to follow up with your orthopedic doctor, Dr. Forrest, within 2 weeks of discharge - please call to make an appointment.

## 2020-12-01 NOTE — DISCHARGE NOTE PROVIDER - NSDCMRMEDTOKEN_GEN_ALL_CORE_FT
amLODIPine 2.5 mg oral tablet: 1 tab(s) orally once a day  aspirin 81 mg oral tablet: 1 tab(s) orally once a day in am    atorvastatin 40 mg oral tablet: 1 tab(s) orally once a day (at bedtime)  Lasix 20 mg oral tablet: 1 tab(s) orally once a day  senna oral tablet: 2 tab(s) orally once a day (at bedtime)  Hold for loose stool   Vitamin D3 1000 intl units oral capsule: 1 cap(s) orally 2 times a day   amLODIPine 2.5 mg oral tablet: 1 tab(s) orally once a day  aspirin 81 mg oral tablet: 1 tab(s) orally once a day in am    atorvastatin 40 mg oral tablet: 1 tab(s) orally once a day (at bedtime)  donepezil 10 mg oral tablet: 1 tab(s) orally once a day (at bedtime)  Lasix 20 mg oral tablet: 1 tab(s) orally once a day  senna oral tablet: 2 tab(s) orally once a day (at bedtime)  Hold for loose stool   Vitamin D3 1000 intl units oral capsule: 1 cap(s) orally 2 times a day   amLODIPine 2.5 mg oral tablet: 1 tab(s) orally once a day  aspirin 81 mg oral tablet: 1 tab(s) orally once a day in am    atorvastatin 40 mg oral tablet: 1 tab(s) orally once a day (at bedtime)  donepezil 10 mg oral tablet: 1 tab(s) orally once a day (at bedtime)  Lasix 20 mg oral tablet: 1 tab(s) orally on Tuesdays, Thursdays, Saturdays, and Sundays  senna oral tablet: 2 tab(s) orally once a day (at bedtime)  Hold for loose stool   Vitamin D3 1000 intl units oral capsule: 1 cap(s) orally 2 times a day

## 2020-12-01 NOTE — PROGRESS NOTE ADULT - SUBJECTIVE AND OBJECTIVE BOX
88 F w/ pmh of ?dementia, is aaox2 (knows birthday, location and self- doesn't know today is thanksgiving) presents to the ER w/ fall ?unknown if hit head pt states "why am I here" presents to the ER w/ R hip pain. tenderness when palpating the hip.  w/u shows that she has sustained a pubic ramus facture and is admitted for pain control  She was given narcotic pain med and was difficult to arouses  Will use IV Tylenol for pain control. Patient OOB sitting in chair. Pain seems to be well controlled. Tolerating PO well. Continues to be confused at times    MEDICATIONS  (STANDING):  amLODIPine   Tablet 2.5 milliGRAM(s) Oral daily  aspirin enteric coated 81 milliGRAM(s) Oral daily  atorvastatin 40 milliGRAM(s) Oral at bedtime  cholecalciferol 1000 Unit(s) Oral two times a day  donepezil 10 milliGRAM(s) Oral at bedtime  furosemide    Tablet 20 milliGRAM(s) Oral <User Schedule>  senna 2 Tablet(s) Oral at bedtime    MEDICATIONS  (PRN):          VITALS:   T(C): 36.9 (12-01-20 @ 11:04), Max: 36.9 (11-30-20 @ 21:02)  HR: 62 (12-01-20 @ 11:45) (61 - 69)  BP: 121/59 (12-01-20 @ 11:45) (109/61 - 136/61)  RR: 17 (12-01-20 @ 11:04) (17 - 18)  SpO2: 96% (12-01-20 @ 11:45) (96% - 96%)  Wt(kg): --    PHYSICAL EXAM:  GENERAL: NAD, well nourished and conversant  HEAD:  Atraumatic  EYES: EOM, PERRLA, conjunctiva pink and sclera white  ENT: No tonsillar erythema, exudates, or enlargement, moist mucous membranes, good dentition, no lesions  NECK: Supple, No JVD, normal thyroid, carotids with normal upstrokes and no bruits  CHEST/LUNG: Clear to auscultation bilaterally, No rales, rhonchi, wheezing, or rubs  HEART: Regular rate and rhythm, No murmurs, rubs, or gallops  ABDOMEN: Soft, nondistended, no masses, guarding, tenderness or rebound, bowel sounds present  EXTREMITIES:  2+ Peripheral Pulses, No clubbing, cyanosis, or edema. No arthritis of shoulders, elbows, hands, hips, knees, ankles, or feet. No DJD C spine, T spine, or L/S spine  LYMPH: No lymphadenopathy noted  SKIN: No rashes or lesions  NERVOUS SYSTEM:  confused at times. Motor Strength 5/5 right upper and right lower.  5/5 left upper and left lower extremities, DTRs 2+ intact and symmetric    LABS:        CBC Full  -  ( 30 Nov 2020 10:05 )  WBC Count : 6.58 K/uL  RBC Count : 3.16 M/uL  Hemoglobin : 10.0 g/dL  Hematocrit : 31.4 %  Platelet Count - Automated : 183 K/uL  Mean Cell Volume : 99.4 fl  Mean Cell Hemoglobin : 31.6 pg  Mean Cell Hemoglobin Concentration : 31.8 gm/dL  Auto Neutrophil # : x  Auto Lymphocyte # : x  Auto Monocyte # : x  Auto Eosinophil # : x  Auto Basophil # : x  Auto Neutrophil % : x  Auto Lymphocyte % : x  Auto Monocyte % : x  Auto Eosinophil % : x  Auto Basophil % : x    11-30    139  |  105  |  27<H>  ----------------------------<  90  3.9   |  24  |  0.89    Ca    9.2      30 Nov 2020 10:05    TPro  5.7<L>  /  Alb  3.2<L>  /  TBili  0.5  /  DBili  x   /  AST  17  /  ALT  17  /  AlkPhos  175<H>  11-30    LIVER FUNCTIONS - ( 30 Nov 2020 10:05 )  Alb: 3.2 g/dL / Pro: 5.7 g/dL / ALK PHOS: 175 U/L / ALT: 17 U/L / AST: 17 U/L / GGT: x               CAPILLARY BLOOD GLUCOSE          RADIOLOGY & ADDITIONAL TESTS:

## 2020-12-02 LAB
ALBUMIN SERPL ELPH-MCNC: 2.9 G/DL — LOW (ref 3.3–5)
ALP SERPL-CCNC: 163 U/L — HIGH (ref 40–120)
ALT FLD-CCNC: 16 U/L — SIGNIFICANT CHANGE UP (ref 10–45)
ANION GAP SERPL CALC-SCNC: 13 MMOL/L — SIGNIFICANT CHANGE UP (ref 5–17)
AST SERPL-CCNC: 16 U/L — SIGNIFICANT CHANGE UP (ref 10–40)
BILIRUB SERPL-MCNC: 0.6 MG/DL — SIGNIFICANT CHANGE UP (ref 0.2–1.2)
BUN SERPL-MCNC: 24 MG/DL — HIGH (ref 7–23)
CALCIUM SERPL-MCNC: 9.2 MG/DL — SIGNIFICANT CHANGE UP (ref 8.4–10.5)
CHLORIDE SERPL-SCNC: 103 MMOL/L — SIGNIFICANT CHANGE UP (ref 96–108)
CO2 SERPL-SCNC: 24 MMOL/L — SIGNIFICANT CHANGE UP (ref 22–31)
CREAT SERPL-MCNC: 0.74 MG/DL — SIGNIFICANT CHANGE UP (ref 0.5–1.3)
GLUCOSE SERPL-MCNC: 88 MG/DL — SIGNIFICANT CHANGE UP (ref 70–99)
HCT VFR BLD CALC: 29.1 % — LOW (ref 34.5–45)
HGB BLD-MCNC: 9.5 G/DL — LOW (ref 11.5–15.5)
MCHC RBC-ENTMCNC: 32.4 PG — SIGNIFICANT CHANGE UP (ref 27–34)
MCHC RBC-ENTMCNC: 32.6 GM/DL — SIGNIFICANT CHANGE UP (ref 32–36)
MCV RBC AUTO: 99.3 FL — SIGNIFICANT CHANGE UP (ref 80–100)
NRBC # BLD: 0 /100 WBCS — SIGNIFICANT CHANGE UP (ref 0–0)
PLATELET # BLD AUTO: 190 K/UL — SIGNIFICANT CHANGE UP (ref 150–400)
POTASSIUM SERPL-MCNC: 3.9 MMOL/L — SIGNIFICANT CHANGE UP (ref 3.5–5.3)
POTASSIUM SERPL-SCNC: 3.9 MMOL/L — SIGNIFICANT CHANGE UP (ref 3.5–5.3)
PROT SERPL-MCNC: 5.4 G/DL — LOW (ref 6–8.3)
RBC # BLD: 2.93 M/UL — LOW (ref 3.8–5.2)
RBC # FLD: 13.7 % — SIGNIFICANT CHANGE UP (ref 10.3–14.5)
SODIUM SERPL-SCNC: 140 MMOL/L — SIGNIFICANT CHANGE UP (ref 135–145)
WBC # BLD: 7.02 K/UL — SIGNIFICANT CHANGE UP (ref 3.8–10.5)
WBC # FLD AUTO: 7.02 K/UL — SIGNIFICANT CHANGE UP (ref 3.8–10.5)

## 2020-12-02 RX ORDER — ACETAMINOPHEN 500 MG
650 TABLET ORAL ONCE
Refills: 0 | Status: COMPLETED | OUTPATIENT
Start: 2020-12-02 | End: 2020-12-02

## 2020-12-02 RX ADMIN — ATORVASTATIN CALCIUM 40 MILLIGRAM(S): 80 TABLET, FILM COATED ORAL at 22:24

## 2020-12-02 RX ADMIN — Medication 650 MILLIGRAM(S): at 11:14

## 2020-12-02 RX ADMIN — AMLODIPINE BESYLATE 2.5 MILLIGRAM(S): 2.5 TABLET ORAL at 05:25

## 2020-12-02 RX ADMIN — Medication 81 MILLIGRAM(S): at 09:42

## 2020-12-02 RX ADMIN — Medication 650 MILLIGRAM(S): at 12:30

## 2020-12-02 RX ADMIN — DONEPEZIL HYDROCHLORIDE 10 MILLIGRAM(S): 10 TABLET, FILM COATED ORAL at 22:25

## 2020-12-02 RX ADMIN — SENNA PLUS 2 TABLET(S): 8.6 TABLET ORAL at 22:24

## 2020-12-02 RX ADMIN — Medication 1000 UNIT(S): at 05:27

## 2020-12-02 RX ADMIN — Medication 1000 UNIT(S): at 18:01

## 2020-12-02 NOTE — DIETITIAN INITIAL EVALUATION ADULT. - OTHER INFO
Confirmed information with RN. Pt reports continues with good appetite and PO intake in house. Noted 100% PO intake as per flow sheets. RN confirms "pt eats everything". Pt denies difficulty chewing/swallowing. Pt denies nausea, vomiting, diarrhea, or constipation, reports last BM today (12/02).     Pt denies weight changes PTA, reports  pounds. Weights as per previous RD note (04/24/2020) 117.5 and 123.4 pounds. Weight as per flow sheets (11/26) 164 pounds -?accuracy of weights, will continue to monitor (pt sitting in chair at this time).     Pt denies having questions/concerns about diet and nutrition; made aware RD remains available.

## 2020-12-02 NOTE — DIETITIAN INITIAL EVALUATION ADULT. - REASON FOR ADMISSION
Pt 87 y/o F with PMH: dementia, HTN, HLD, lung cancer, pre-DM?, presents to the ER with fall, found with inferior pubic ramus facture, admitted for pain control, UTI.

## 2020-12-02 NOTE — PROGRESS NOTE ADULT - SUBJECTIVE AND OBJECTIVE BOX
88 F w/ pmh of ?dementia, is aaox2 (knows birthday, location and self- doesn't know today is thanksgiving) presents to the ER w/ fall ?unknown if hit head pt states "why am I here" presents to the ER w/ R hip pain. tenderness when palpating the hip.  w/u shows that she has sustained a pubic ramus facture and is admitted for pain control  She was given narcotic pain med and was difficult to arouses  Will use IV Tylenol for pain control. Patient OOB sitting in chair. Pain seems to be well controlled. Tolerating PO well. Continues to be confused at times    MEDICATIONS  (STANDING):  amLODIPine   Tablet 2.5 milliGRAM(s) Oral daily  aspirin enteric coated 81 milliGRAM(s) Oral daily  atorvastatin 40 milliGRAM(s) Oral at bedtime  cholecalciferol 1000 Unit(s) Oral two times a day  donepezil 10 milliGRAM(s) Oral at bedtime  furosemide    Tablet 20 milliGRAM(s) Oral <User Schedule>  senna 2 Tablet(s) Oral at bedtime    MEDICATIONS  (PRN):    Vital Signs Last 24 Hrs  T(C): 36.6 (02 Dec 2020 04:27), Max: 36.9 (01 Dec 2020 11:04)  T(F): 97.9 (02 Dec 2020 04:27), Max: 98.4 (01 Dec 2020 11:04)  HR: 61 (02 Dec 2020 05:20) (61 - 69)  BP: 132/66 (02 Dec 2020 05:20) (109/61 - 132/66)  BP(mean): --  RR: 17 (02 Dec 2020 05:20) (17 - 18)  SpO2: 95% (02 Dec 2020 05:20) (94% - 96%)        PHYSICAL EXAM:  GENERAL: NAD, well nourished and conversant  HEAD:  Atraumatic  EYES: EOM, PERRLA, conjunctiva pink and sclera white  ENT: No tonsillar erythema, exudates, or enlargement, moist mucous membranes, good dentition, no lesions  NECK: Supple, No JVD, normal thyroid, carotids with normal upstrokes and no bruits  CHEST/LUNG: Clear to auscultation bilaterally, No rales, rhonchi, wheezing, or rubs  HEART: Regular rate and rhythm, No murmurs, rubs, or gallops  ABDOMEN: Soft, nondistended, no masses, guarding, tenderness or rebound, bowel sounds present  EXTREMITIES:  2+ Peripheral Pulses, No clubbing, cyanosis, or edema. No arthritis of shoulders, elbows, hands, hips, knees, ankles, or feet. No DJD C spine, T spine, or L/S spine  LYMPH: No lymphadenopathy noted  SKIN: No rashes or lesions  NERVOUS SYSTEM:  confused at times. Motor Strength 5/5 right upper and right lower.  5/5 left upper and left lower extremities, DTRs 2+ intact and symmetric    LABS:          CBC Full  -  ( 02 Dec 2020 06:56 )  WBC Count : 7.02 K/uL  RBC Count : 2.93 M/uL  Hemoglobin : 9.5 g/dL  Hematocrit : 29.1 %  Platelet Count - Automated : 190 K/uL  Mean Cell Volume : 99.3 fl  Mean Cell Hemoglobin : 32.4 pg  Mean Cell Hemoglobin Concentration : 32.6 gm/dL  Auto Neutrophil # : x  Auto Lymphocyte # : x  Auto Monocyte # : x  Auto Eosinophil # : x  Auto Basophil # : x  Auto Neutrophil % : x  Auto Lymphocyte % : x  Auto Monocyte % : x  Auto Eosinophil % : x  Auto Basophil % : x    12-02    140  |  103  |  24<H>  ----------------------------<  88  3.9   |  24  |  0.74    Ca    9.2      02 Dec 2020 06:56    TPro  5.4<L>  /  Alb  2.9<L>  /  TBili  0.6  /  DBili  x   /  AST  16  /  ALT  16  /  AlkPhos  163<H>  12-02    LIVER FUNCTIONS - ( 02 Dec 2020 06:56 )  Alb: 2.9 g/dL / Pro: 5.4 g/dL / ALK PHOS: 163 U/L / ALT: 16 U/L / AST: 16 U/L / GGT: x                 CBC Full  -  ( 30 Nov 2020 10:05 )  WBC Count : 6.58 K/uL  RBC Count : 3.16 M/uL  Hemoglobin : 10.0 g/dL  Hematocrit : 31.4 %  Platelet Count - Automated : 183 K/uL  Mean Cell Volume : 99.4 fl  Mean Cell Hemoglobin : 31.6 pg  Mean Cell Hemoglobin Concentration : 31.8 gm/dL  Auto Neutrophil # : x  Auto Lymphocyte # : x  Auto Monocyte # : x  Auto Eosinophil # : x  Auto Basophil # : x  Auto Neutrophil % : x  Auto Lymphocyte % : x  Auto Monocyte % : x  Auto Eosinophil % : x  Auto Basophil % : x    11-30    139  |  105  |  27<H>  ----------------------------<  90  3.9   |  24  |  0.89    Ca    9.2      30 Nov 2020 10:05    TPro  5.7<L>  /  Alb  3.2<L>  /  TBili  0.5  /  DBili  x   /  AST  17  /  ALT  17  /  AlkPhos  175<H>  11-30    LIVER FUNCTIONS - ( 30 Nov 2020 10:05 )  Alb: 3.2 g/dL / Pro: 5.7 g/dL / ALK PHOS: 175 U/L / ALT: 17 U/L / AST: 17 U/L / GGT: x               CAPILLARY BLOOD GLUCOSE          RADIOLOGY & ADDITIONAL TESTS:

## 2020-12-02 NOTE — DIETITIAN INITIAL EVALUATION ADULT. - PROBLEM SELECTOR PLAN 1
Right inferioir pubic ramus fracture  Pain control  use IV tylenol as she became extremely lethargic on one dose of narcotic pian med

## 2020-12-02 NOTE — DIETITIAN INITIAL EVALUATION ADULT. - REASON INDICATOR FOR ASSESSMENT
Pt seen for length of stay initial assessment.   Information obtained from: medical record, previous RD note, pt, and RN.   Pt confused at times as per documentation; unable to reach reference contact -?accuracy of information obtained.

## 2020-12-02 NOTE — PROGRESS NOTE ADULT - PROBLEM SELECTOR PLAN 1
Right inferior pubic ramus fracture  will continue physical therapy  as tolerated  Pain has been well controlled

## 2020-12-02 NOTE — DIETITIAN INITIAL EVALUATION ADULT. - ADD RECOMMEND
1. Will continue to monitor PO intake, weight, labs, skin, GI status, diet. 2. Encourage PO intake and obtain food preferences (pt did not have any at this time) - made aware RD remains available. 3. Consider Multivitamin if medically feasible to optimize nutrient intake and aid with pressure ulcer healing.

## 2020-12-02 NOTE — DIETITIAN INITIAL EVALUATION ADULT. - PHYSCIAL ASSESSMENT
well nourished Skin: pressure ulcers in sacrum and armida. heel stage 1 as per documentation.  No visual signs of muscle/fat loss noted.

## 2020-12-03 LAB
ALBUMIN SERPL ELPH-MCNC: 2.8 G/DL — LOW (ref 3.3–5)
ALP SERPL-CCNC: 140 U/L — HIGH (ref 40–120)
ALT FLD-CCNC: 22 U/L — SIGNIFICANT CHANGE UP (ref 10–45)
ANION GAP SERPL CALC-SCNC: 9 MMOL/L — SIGNIFICANT CHANGE UP (ref 5–17)
AST SERPL-CCNC: 19 U/L — SIGNIFICANT CHANGE UP (ref 10–40)
BILIRUB SERPL-MCNC: 0.5 MG/DL — SIGNIFICANT CHANGE UP (ref 0.2–1.2)
BUN SERPL-MCNC: 23 MG/DL — SIGNIFICANT CHANGE UP (ref 7–23)
CALCIUM SERPL-MCNC: 8.9 MG/DL — SIGNIFICANT CHANGE UP (ref 8.4–10.5)
CHLORIDE SERPL-SCNC: 105 MMOL/L — SIGNIFICANT CHANGE UP (ref 96–108)
CO2 SERPL-SCNC: 25 MMOL/L — SIGNIFICANT CHANGE UP (ref 22–31)
CREAT SERPL-MCNC: 0.73 MG/DL — SIGNIFICANT CHANGE UP (ref 0.5–1.3)
CULTURE RESULTS: SIGNIFICANT CHANGE UP
GLUCOSE SERPL-MCNC: 91 MG/DL — SIGNIFICANT CHANGE UP (ref 70–99)
HCT VFR BLD CALC: 27.1 % — LOW (ref 34.5–45)
HGB BLD-MCNC: 8.9 G/DL — LOW (ref 11.5–15.5)
MCHC RBC-ENTMCNC: 32.6 PG — SIGNIFICANT CHANGE UP (ref 27–34)
MCHC RBC-ENTMCNC: 32.8 GM/DL — SIGNIFICANT CHANGE UP (ref 32–36)
MCV RBC AUTO: 99.3 FL — SIGNIFICANT CHANGE UP (ref 80–100)
NRBC # BLD: 0 /100 WBCS — SIGNIFICANT CHANGE UP (ref 0–0)
PLATELET # BLD AUTO: 209 K/UL — SIGNIFICANT CHANGE UP (ref 150–400)
POTASSIUM SERPL-MCNC: 3.9 MMOL/L — SIGNIFICANT CHANGE UP (ref 3.5–5.3)
POTASSIUM SERPL-SCNC: 3.9 MMOL/L — SIGNIFICANT CHANGE UP (ref 3.5–5.3)
PROT SERPL-MCNC: 5.2 G/DL — LOW (ref 6–8.3)
RBC # BLD: 2.73 M/UL — LOW (ref 3.8–5.2)
RBC # FLD: 13.8 % — SIGNIFICANT CHANGE UP (ref 10.3–14.5)
SODIUM SERPL-SCNC: 139 MMOL/L — SIGNIFICANT CHANGE UP (ref 135–145)
SPECIMEN SOURCE: SIGNIFICANT CHANGE UP
WBC # BLD: 6.16 K/UL — SIGNIFICANT CHANGE UP (ref 3.8–10.5)
WBC # FLD AUTO: 6.16 K/UL — SIGNIFICANT CHANGE UP (ref 3.8–10.5)

## 2020-12-03 RX ADMIN — Medication 1000 UNIT(S): at 06:11

## 2020-12-03 RX ADMIN — Medication 81 MILLIGRAM(S): at 11:56

## 2020-12-03 RX ADMIN — Medication 20 MILLIGRAM(S): at 06:11

## 2020-12-03 RX ADMIN — Medication 1000 UNIT(S): at 17:38

## 2020-12-03 RX ADMIN — DONEPEZIL HYDROCHLORIDE 10 MILLIGRAM(S): 10 TABLET, FILM COATED ORAL at 22:11

## 2020-12-03 RX ADMIN — ATORVASTATIN CALCIUM 40 MILLIGRAM(S): 80 TABLET, FILM COATED ORAL at 22:12

## 2020-12-03 RX ADMIN — SENNA PLUS 2 TABLET(S): 8.6 TABLET ORAL at 22:12

## 2020-12-03 RX ADMIN — AMLODIPINE BESYLATE 2.5 MILLIGRAM(S): 2.5 TABLET ORAL at 06:11

## 2020-12-03 NOTE — PROGRESS NOTE ADULT - SUBJECTIVE AND OBJECTIVE BOX
88 F w/ pmh of ?dementia, is aaox2 (knows birthday, location and self- doesn't know today is thanksgiving) presents to the ER w/ fall ?unknown if hit head pt states "why am I here" presents to the ER w/ R hip pain. tenderness when palpating the hip.  w/u shows that she has sustained a pubic ramus facture and is admitted for pain control  She was given narcotic pain med and was difficult to arouses  Will use IV Tylenol for pain control.. Pain seems to be well controlled. Tolerating PO well. Continues to be confused at times      MEDICATIONS  (STANDING):  amLODIPine   Tablet 2.5 milliGRAM(s) Oral daily  aspirin enteric coated 81 milliGRAM(s) Oral daily  atorvastatin 40 milliGRAM(s) Oral at bedtime  cholecalciferol 1000 Unit(s) Oral two times a day  donepezil 10 milliGRAM(s) Oral at bedtime  furosemide    Tablet 20 milliGRAM(s) Oral <User Schedule>  senna 2 Tablet(s) Oral at bedtime    MEDICATIONS  (PRN):          VITALS:   T(C): 36.7 (12-03-20 @ 12:09), Max: 37 (12-02-20 @ 20:19)  HR: 60 (12-03-20 @ 12:09) (60 - 67)  BP: 106/63 (12-03-20 @ 12:09) (106/63 - 132/71)  RR: 18 (12-03-20 @ 12:09) (18 - 18)  SpO2: 100% (12-03-20 @ 12:09) (95% - 100%)  Wt(kg): --      PHYSICAL EXAM:  GENERAL: NAD, well nourished and conversant  HEAD:  Atraumatic  EYES: EOM, PERRLA, conjunctiva pink and sclera white  ENT: No tonsillar erythema, exudates, or enlargement, moist mucous membranes, good dentition, no lesions  NECK: Supple, No JVD, normal thyroid, carotids with normal upstrokes and no bruits  CHEST/LUNG: Clear to auscultation bilaterally, No rales, rhonchi, wheezing, or rubs  HEART: Regular rate and rhythm, No murmurs, rubs, or gallops  ABDOMEN: Soft, nondistended, no masses, guarding, tenderness or rebound, bowel sounds present  EXTREMITIES:  2+ Peripheral Pulses, No clubbing, cyanosis, or edema. No arthritis of shoulders, elbows, hands, hips, knees, ankles, or feet. No DJD C spine, T spine, or L/S spine  LYMPH: No lymphadenopathy noted  SKIN: No rashes or lesions  NERVOUS SYSTEM:  confused at times. Motor Strength 5/5 right upper and right lower.  5/5 left upper and left lower extremities, DTRs 2+ intact and symmetric  LABS:        CBC Full  -  ( 03 Dec 2020 07:06 )  WBC Count : 6.16 K/uL  RBC Count : 2.73 M/uL  Hemoglobin : 8.9 g/dL  Hematocrit : 27.1 %  Platelet Count - Automated : 209 K/uL  Mean Cell Volume : 99.3 fl  Mean Cell Hemoglobin : 32.6 pg  Mean Cell Hemoglobin Concentration : 32.8 gm/dL  Auto Neutrophil # : x  Auto Lymphocyte # : x  Auto Monocyte # : x  Auto Eosinophil # : x  Auto Basophil # : x  Auto Neutrophil % : x  Auto Lymphocyte % : x  Auto Monocyte % : x  Auto Eosinophil % : x  Auto Basophil % : x    12-03    139  |  105  |  23  ----------------------------<  91  3.9   |  25  |  0.73    Ca    8.9      03 Dec 2020 07:03    TPro  5.2<L>  /  Alb  2.8<L>  /  TBili  0.5  /  DBili  x   /  AST  19  /  ALT  22  /  AlkPhos  140<H>  12-03    LIVER FUNCTIONS - ( 03 Dec 2020 07:03 )  Alb: 2.8 g/dL / Pro: 5.2 g/dL / ALK PHOS: 140 U/L / ALT: 22 U/L / AST: 19 U/L / GGT: x               CAPILLARY BLOOD GLUCOSE          RADIOLOGY & ADDITIONAL TESTS:

## 2020-12-04 VITALS
RESPIRATION RATE: 18 BRPM | SYSTOLIC BLOOD PRESSURE: 120 MMHG | DIASTOLIC BLOOD PRESSURE: 75 MMHG | OXYGEN SATURATION: 98 % | TEMPERATURE: 98 F | HEART RATE: 70 BPM

## 2020-12-04 LAB
ALBUMIN SERPL ELPH-MCNC: 3 G/DL — LOW (ref 3.3–5)
ALP SERPL-CCNC: 158 U/L — HIGH (ref 40–120)
ALT FLD-CCNC: 19 U/L — SIGNIFICANT CHANGE UP (ref 10–45)
ANION GAP SERPL CALC-SCNC: 9 MMOL/L — SIGNIFICANT CHANGE UP (ref 5–17)
AST SERPL-CCNC: 19 U/L — SIGNIFICANT CHANGE UP (ref 10–40)
BILIRUB SERPL-MCNC: 0.7 MG/DL — SIGNIFICANT CHANGE UP (ref 0.2–1.2)
BUN SERPL-MCNC: 23 MG/DL — SIGNIFICANT CHANGE UP (ref 7–23)
CALCIUM SERPL-MCNC: 9.3 MG/DL — SIGNIFICANT CHANGE UP (ref 8.4–10.5)
CHLORIDE SERPL-SCNC: 101 MMOL/L — SIGNIFICANT CHANGE UP (ref 96–108)
CO2 SERPL-SCNC: 27 MMOL/L — SIGNIFICANT CHANGE UP (ref 22–31)
CREAT SERPL-MCNC: 0.62 MG/DL — SIGNIFICANT CHANGE UP (ref 0.5–1.3)
GLUCOSE SERPL-MCNC: 84 MG/DL — SIGNIFICANT CHANGE UP (ref 70–99)
HCT VFR BLD CALC: 29.8 % — LOW (ref 34.5–45)
HGB BLD-MCNC: 9.5 G/DL — LOW (ref 11.5–15.5)
MCHC RBC-ENTMCNC: 31.9 GM/DL — LOW (ref 32–36)
MCHC RBC-ENTMCNC: 32 PG — SIGNIFICANT CHANGE UP (ref 27–34)
MCV RBC AUTO: 100.3 FL — HIGH (ref 80–100)
NRBC # BLD: 0 /100 WBCS — SIGNIFICANT CHANGE UP (ref 0–0)
PLATELET # BLD AUTO: 249 K/UL — SIGNIFICANT CHANGE UP (ref 150–400)
POTASSIUM SERPL-MCNC: 3.5 MMOL/L — SIGNIFICANT CHANGE UP (ref 3.5–5.3)
POTASSIUM SERPL-SCNC: 3.5 MMOL/L — SIGNIFICANT CHANGE UP (ref 3.5–5.3)
PROT SERPL-MCNC: 5.8 G/DL — LOW (ref 6–8.3)
RBC # BLD: 2.97 M/UL — LOW (ref 3.8–5.2)
RBC # FLD: 13.5 % — SIGNIFICANT CHANGE UP (ref 10.3–14.5)
SODIUM SERPL-SCNC: 137 MMOL/L — SIGNIFICANT CHANGE UP (ref 135–145)
WBC # BLD: 7.29 K/UL — SIGNIFICANT CHANGE UP (ref 3.8–10.5)
WBC # FLD AUTO: 7.29 K/UL — SIGNIFICANT CHANGE UP (ref 3.8–10.5)

## 2020-12-04 RX ORDER — DONEPEZIL HYDROCHLORIDE 10 MG/1
1 TABLET, FILM COATED ORAL
Qty: 0 | Refills: 0 | DISCHARGE
Start: 2020-12-04

## 2020-12-04 RX ORDER — INFLUENZA VIRUS VACCINE 15; 15; 15; 15 UG/.5ML; UG/.5ML; UG/.5ML; UG/.5ML
0.5 SUSPENSION INTRAMUSCULAR ONCE
Refills: 0 | Status: COMPLETED | OUTPATIENT
Start: 2020-12-04 | End: 2020-12-04

## 2020-12-04 RX ORDER — FUROSEMIDE 40 MG
1 TABLET ORAL
Qty: 0 | Refills: 0 | DISCHARGE

## 2020-12-04 RX ADMIN — Medication 81 MILLIGRAM(S): at 11:51

## 2020-12-04 RX ADMIN — AMLODIPINE BESYLATE 2.5 MILLIGRAM(S): 2.5 TABLET ORAL at 06:20

## 2020-12-04 RX ADMIN — INFLUENZA VIRUS VACCINE 0.5 MILLILITER(S): 15; 15; 15; 15 SUSPENSION INTRAMUSCULAR at 13:12

## 2020-12-04 RX ADMIN — Medication 1000 UNIT(S): at 06:20

## 2020-12-04 NOTE — CHART NOTE - NSCHARTNOTEFT_GEN_A_CORE
Request from Dr. Teague to facilitate patient discharge.  Medication reconciliation reviewed, revised, and resolved with Dr. Teague, who has medically cleared patient for discharge with follow up as advised.  Please refer to discharge note for detailed hospital course.

## 2020-12-04 NOTE — PROGRESS NOTE ADULT - NSHPATTENDINGPLANDISCUSS_GEN_ALL_CORE
Patient and ER staff
Patient and staff

## 2020-12-04 NOTE — PROGRESS NOTE ADULT - SUBJECTIVE AND OBJECTIVE BOX
88 F w/ pmh of ?dementia, is aaox2 (knows birthday, location and self- doesn't know today is thanksgiving) presents to the ER w/ fall ?unknown if hit head pt states "why am I here" presents to the ER w/ R hip pain. tenderness when palpating the hip.  w/u shows that she has sustained a pubic ramus facture and is admitted for pain control  She was given narcotic pain med and was difficult to arouses  Will use IV Tylenol for pain control.. Pain seems to be well controlled. Tolerating PO well. Continues to be confused at times      MEDICATIONS  (STANDING):  amLODIPine   Tablet 2.5 milliGRAM(s) Oral daily  aspirin enteric coated 81 milliGRAM(s) Oral daily  atorvastatin 40 milliGRAM(s) Oral at bedtime  cholecalciferol 1000 Unit(s) Oral two times a day  donepezil 10 milliGRAM(s) Oral at bedtime  furosemide    Tablet 20 milliGRAM(s) Oral <User Schedule>  senna 2 Tablet(s) Oral at bedtime    MEDICATIONS  (PRN):          VITALS:   T(C): 36.8 (12-04-20 @ 04:17), Max: 37.2 (12-03-20 @ 19:51)  HR: 66 (12-04-20 @ 04:17) (66 - 70)  BP: 112/66 (12-04-20 @ 04:17) (105/61 - 112/66)  RR: 18 (12-04-20 @ 04:17) (18 - 18)  SpO2: 96% (12-04-20 @ 04:17) (96% - 98%)  Wt(kg): --    PHYSICAL EXAM:  GENERAL: NAD, well nourished and conversant  HEAD:  Atraumatic  EYES: EOM, PERRLA, conjunctiva pink and sclera white  ENT: No tonsillar erythema, exudates, or enlargement, moist mucous membranes, good dentition, no lesions  NECK: Supple, No JVD, normal thyroid, carotids with normal upstrokes and no bruits  CHEST/LUNG: Clear to auscultation bilaterally, No rales, rhonchi, wheezing, or rubs  HEART: Regular rate and rhythm, No murmurs, rubs, or gallops  ABDOMEN: Soft, nondistended, no masses, guarding, tenderness or rebound, bowel sounds present  EXTREMITIES:  2+ Peripheral Pulses, No clubbing, cyanosis, or edema. No arthritis of shoulders, elbows, hands, hips, knees, ankles, or feet. No DJD C spine, T spine, or L/S spine  LYMPH: No lymphadenopathy noted  SKIN: No rashes or lesions  NERVOUS SYSTEM:  confused at times. Motor Strength 5/5 right upper and right lower.  5/5 left upper and left lower extremities, DTRs 2+ intact and symmetric    LABS:        CBC Full  -  ( 04 Dec 2020 07:16 )  WBC Count : 7.29 K/uL  RBC Count : 2.97 M/uL  Hemoglobin : 9.5 g/dL  Hematocrit : 29.8 %  Platelet Count - Automated : 249 K/uL  Mean Cell Volume : 100.3 fl  Mean Cell Hemoglobin : 32.0 pg  Mean Cell Hemoglobin Concentration : 31.9 gm/dL  Auto Neutrophil # : x  Auto Lymphocyte # : x  Auto Monocyte # : x  Auto Eosinophil # : x  Auto Basophil # : x  Auto Neutrophil % : x  Auto Lymphocyte % : x  Auto Monocyte % : x  Auto Eosinophil % : x  Auto Basophil % : x    12-04    137  |  101  |  23  ----------------------------<  84  3.5   |  27  |  0.62    Ca    9.3      04 Dec 2020 07:14    TPro  5.8<L>  /  Alb  3.0<L>  /  TBili  0.7  /  DBili  x   /  AST  19  /  ALT  19  /  AlkPhos  158<H>  12-04    LIVER FUNCTIONS - ( 04 Dec 2020 07:14 )  Alb: 3.0 g/dL / Pro: 5.8 g/dL / ALK PHOS: 158 U/L / ALT: 19 U/L / AST: 19 U/L / GGT: x               CAPILLARY BLOOD GLUCOSE          RADIOLOGY & ADDITIONAL TESTS:

## 2020-12-04 NOTE — PROGRESS NOTE ADULT - REASON FOR ADMISSION
pubic ramus fx

## 2020-12-04 NOTE — PROGRESS NOTE ADULT - ATTENDING COMMENTS
DC planning to rehab
DC planning to rehab  continue current meds  PO as tolerated  activity as tolerated   follow up with PMD  Patient aware of plan

## 2020-12-05 PROCEDURE — 96374 THER/PROPH/DIAG INJ IV PUSH: CPT

## 2020-12-05 PROCEDURE — 81001 URINALYSIS AUTO W/SCOPE: CPT

## 2020-12-05 PROCEDURE — 70450 CT HEAD/BRAIN W/O DYE: CPT

## 2020-12-05 PROCEDURE — 97530 THERAPEUTIC ACTIVITIES: CPT

## 2020-12-05 PROCEDURE — 87086 URINE CULTURE/COLONY COUNT: CPT

## 2020-12-05 PROCEDURE — 85025 COMPLETE CBC W/AUTO DIFF WBC: CPT

## 2020-12-05 PROCEDURE — 73552 X-RAY EXAM OF FEMUR 2/>: CPT

## 2020-12-05 PROCEDURE — 85652 RBC SED RATE AUTOMATED: CPT

## 2020-12-05 PROCEDURE — 85027 COMPLETE CBC AUTOMATED: CPT

## 2020-12-05 PROCEDURE — 72190 X-RAY EXAM OF PELVIS: CPT

## 2020-12-05 PROCEDURE — 80053 COMPREHEN METABOLIC PANEL: CPT

## 2020-12-05 PROCEDURE — 85730 THROMBOPLASTIN TIME PARTIAL: CPT

## 2020-12-05 PROCEDURE — 82962 GLUCOSE BLOOD TEST: CPT

## 2020-12-05 PROCEDURE — 97116 GAIT TRAINING THERAPY: CPT

## 2020-12-05 PROCEDURE — 93005 ELECTROCARDIOGRAM TRACING: CPT

## 2020-12-05 PROCEDURE — U0003: CPT

## 2020-12-05 PROCEDURE — 97162 PT EVAL MOD COMPLEX 30 MIN: CPT

## 2020-12-05 PROCEDURE — 90686 IIV4 VACC NO PRSV 0.5 ML IM: CPT

## 2020-12-05 PROCEDURE — 85610 PROTHROMBIN TIME: CPT

## 2020-12-05 PROCEDURE — 72125 CT NECK SPINE W/O DYE: CPT

## 2020-12-05 PROCEDURE — 86769 SARS-COV-2 COVID-19 ANTIBODY: CPT

## 2020-12-05 PROCEDURE — 99285 EMERGENCY DEPT VISIT HI MDM: CPT | Mod: 25

## 2020-12-05 PROCEDURE — 73501 X-RAY EXAM HIP UNI 1 VIEW: CPT

## 2020-12-05 PROCEDURE — 71045 X-RAY EXAM CHEST 1 VIEW: CPT

## 2020-12-06 LAB — SARS-COV-2 RNA SPEC QL NAA+PROBE: SIGNIFICANT CHANGE UP

## 2021-01-12 ENCOUNTER — INPATIENT (INPATIENT)
Facility: HOSPITAL | Age: 86
LOS: 2 days | Discharge: NURSING, REHAB OR LONG-TERM CARE | DRG: 158 | End: 2021-01-15
Attending: INTERNAL MEDICINE | Admitting: INTERNAL MEDICINE
Payer: MEDICARE

## 2021-01-12 VITALS
HEART RATE: 70 BPM | SYSTOLIC BLOOD PRESSURE: 137 MMHG | OXYGEN SATURATION: 96 % | HEIGHT: 65 IN | RESPIRATION RATE: 18 BRPM | DIASTOLIC BLOOD PRESSURE: 62 MMHG | WEIGHT: 149.91 LBS

## 2021-01-12 DIAGNOSIS — Z98.49 CATARACT EXTRACTION STATUS, UNSPECIFIED EYE: Chronic | ICD-10-CM

## 2021-01-12 DIAGNOSIS — Z98.890 OTHER SPECIFIED POSTPROCEDURAL STATES: Chronic | ICD-10-CM

## 2021-01-12 DIAGNOSIS — Z90.2 ACQUIRED ABSENCE OF LUNG [PART OF]: Chronic | ICD-10-CM

## 2021-01-12 DIAGNOSIS — F03.90 UNSPECIFIED DEMENTIA, UNSPECIFIED SEVERITY, WITHOUT BEHAVIORAL DISTURBANCE, PSYCHOTIC DISTURBANCE, MOOD DISTURBANCE, AND ANXIETY: ICD-10-CM

## 2021-01-12 DIAGNOSIS — S02.402A ZYGOMATIC FRACTURE, UNSPECIFIED SIDE, INITIAL ENCOUNTER FOR CLOSED FRACTURE: ICD-10-CM

## 2021-01-12 DIAGNOSIS — N39.0 URINARY TRACT INFECTION, SITE NOT SPECIFIED: ICD-10-CM

## 2021-01-12 DIAGNOSIS — I10 ESSENTIAL (PRIMARY) HYPERTENSION: ICD-10-CM

## 2021-01-12 LAB
ALBUMIN SERPL ELPH-MCNC: 3.8 G/DL — SIGNIFICANT CHANGE UP (ref 3.3–5)
ALP SERPL-CCNC: 194 U/L — HIGH (ref 40–120)
ALT FLD-CCNC: 25 U/L — SIGNIFICANT CHANGE UP (ref 10–45)
ANION GAP SERPL CALC-SCNC: 9 MMOL/L — SIGNIFICANT CHANGE UP (ref 5–17)
APPEARANCE UR: ABNORMAL
AST SERPL-CCNC: 26 U/L — SIGNIFICANT CHANGE UP (ref 10–40)
BACTERIA # UR AUTO: ABNORMAL
BASOPHILS # BLD AUTO: 0.05 K/UL — SIGNIFICANT CHANGE UP (ref 0–0.2)
BASOPHILS NFR BLD AUTO: 0.4 % — SIGNIFICANT CHANGE UP (ref 0–2)
BILIRUB SERPL-MCNC: 0.5 MG/DL — SIGNIFICANT CHANGE UP (ref 0.2–1.2)
BILIRUB UR-MCNC: NEGATIVE — SIGNIFICANT CHANGE UP
BUN SERPL-MCNC: 16 MG/DL — SIGNIFICANT CHANGE UP (ref 7–23)
CALCIUM SERPL-MCNC: 9.7 MG/DL — SIGNIFICANT CHANGE UP (ref 8.4–10.5)
CHLORIDE SERPL-SCNC: 102 MMOL/L — SIGNIFICANT CHANGE UP (ref 96–108)
CO2 SERPL-SCNC: 27 MMOL/L — SIGNIFICANT CHANGE UP (ref 22–31)
COLOR SPEC: YELLOW — SIGNIFICANT CHANGE UP
CREAT SERPL-MCNC: 0.55 MG/DL — SIGNIFICANT CHANGE UP (ref 0.5–1.3)
DIFF PNL FLD: NEGATIVE — SIGNIFICANT CHANGE UP
EOSINOPHIL # BLD AUTO: 0.11 K/UL — SIGNIFICANT CHANGE UP (ref 0–0.5)
EOSINOPHIL NFR BLD AUTO: 0.9 % — SIGNIFICANT CHANGE UP (ref 0–6)
EPI CELLS # UR: 3 /HPF — SIGNIFICANT CHANGE UP
GLUCOSE SERPL-MCNC: 102 MG/DL — HIGH (ref 70–99)
GLUCOSE UR QL: NEGATIVE — SIGNIFICANT CHANGE UP
HCT VFR BLD CALC: 37.1 % — SIGNIFICANT CHANGE UP (ref 34.5–45)
HGB BLD-MCNC: 12 G/DL — SIGNIFICANT CHANGE UP (ref 11.5–15.5)
HYALINE CASTS # UR AUTO: 4 /LPF — HIGH (ref 0–2)
IMM GRANULOCYTES NFR BLD AUTO: 0.9 % — SIGNIFICANT CHANGE UP (ref 0–1.5)
KETONES UR-MCNC: NEGATIVE — SIGNIFICANT CHANGE UP
LEUKOCYTE ESTERASE UR-ACNC: ABNORMAL
LYMPHOCYTES # BLD AUTO: 1.24 K/UL — SIGNIFICANT CHANGE UP (ref 1–3.3)
LYMPHOCYTES # BLD AUTO: 10.3 % — LOW (ref 13–44)
MCHC RBC-ENTMCNC: 32.1 PG — SIGNIFICANT CHANGE UP (ref 27–34)
MCHC RBC-ENTMCNC: 32.3 GM/DL — SIGNIFICANT CHANGE UP (ref 32–36)
MCV RBC AUTO: 99.2 FL — SIGNIFICANT CHANGE UP (ref 80–100)
MONOCYTES # BLD AUTO: 0.67 K/UL — SIGNIFICANT CHANGE UP (ref 0–0.9)
MONOCYTES NFR BLD AUTO: 5.6 % — SIGNIFICANT CHANGE UP (ref 2–14)
NEUTROPHILS # BLD AUTO: 9.89 K/UL — HIGH (ref 1.8–7.4)
NEUTROPHILS NFR BLD AUTO: 81.9 % — HIGH (ref 43–77)
NITRITE UR-MCNC: POSITIVE
NRBC # BLD: 0 /100 WBCS — SIGNIFICANT CHANGE UP (ref 0–0)
PH UR: 6.5 — SIGNIFICANT CHANGE UP (ref 5–8)
PLATELET # BLD AUTO: 242 K/UL — SIGNIFICANT CHANGE UP (ref 150–400)
POTASSIUM SERPL-MCNC: 4.1 MMOL/L — SIGNIFICANT CHANGE UP (ref 3.5–5.3)
POTASSIUM SERPL-SCNC: 4.1 MMOL/L — SIGNIFICANT CHANGE UP (ref 3.5–5.3)
PROT SERPL-MCNC: 7.1 G/DL — SIGNIFICANT CHANGE UP (ref 6–8.3)
PROT UR-MCNC: ABNORMAL
RBC # BLD: 3.74 M/UL — LOW (ref 3.8–5.2)
RBC # FLD: 14.4 % — SIGNIFICANT CHANGE UP (ref 10.3–14.5)
RBC CASTS # UR COMP ASSIST: 3 /HPF — SIGNIFICANT CHANGE UP (ref 0–4)
SARS-COV-2 RNA SPEC QL NAA+PROBE: SIGNIFICANT CHANGE UP
SODIUM SERPL-SCNC: 138 MMOL/L — SIGNIFICANT CHANGE UP (ref 135–145)
SP GR SPEC: 1.02 — SIGNIFICANT CHANGE UP (ref 1.01–1.02)
UROBILINOGEN FLD QL: NEGATIVE — SIGNIFICANT CHANGE UP
WBC # BLD: 12.07 K/UL — HIGH (ref 3.8–10.5)
WBC # FLD AUTO: 12.07 K/UL — HIGH (ref 3.8–10.5)
WBC UR QL: 10 /HPF — HIGH (ref 0–5)

## 2021-01-12 PROCEDURE — 12011 RPR F/E/E/N/L/M 2.5 CM/<: CPT

## 2021-01-12 PROCEDURE — 71045 X-RAY EXAM CHEST 1 VIEW: CPT | Mod: 26

## 2021-01-12 PROCEDURE — 76377 3D RENDER W/INTRP POSTPROCES: CPT | Mod: 26

## 2021-01-12 PROCEDURE — 70486 CT MAXILLOFACIAL W/O DYE: CPT | Mod: 26

## 2021-01-12 PROCEDURE — 72125 CT NECK SPINE W/O DYE: CPT | Mod: 26

## 2021-01-12 PROCEDURE — 72170 X-RAY EXAM OF PELVIS: CPT | Mod: 26

## 2021-01-12 PROCEDURE — 70450 CT HEAD/BRAIN W/O DYE: CPT | Mod: 26

## 2021-01-12 PROCEDURE — 99285 EMERGENCY DEPT VISIT HI MDM: CPT | Mod: 25

## 2021-01-12 RX ORDER — AMLODIPINE BESYLATE 2.5 MG/1
2.5 TABLET ORAL DAILY
Refills: 0 | Status: DISCONTINUED | OUTPATIENT
Start: 2021-01-12 | End: 2021-01-15

## 2021-01-12 RX ORDER — ATORVASTATIN CALCIUM 80 MG/1
20 TABLET, FILM COATED ORAL AT BEDTIME
Refills: 0 | Status: DISCONTINUED | OUTPATIENT
Start: 2021-01-12 | End: 2021-01-15

## 2021-01-12 RX ORDER — CEFAZOLIN SODIUM 1 G
2000 VIAL (EA) INJECTION ONCE
Refills: 0 | Status: COMPLETED | OUTPATIENT
Start: 2021-01-12 | End: 2021-01-12

## 2021-01-12 RX ORDER — PREGABALIN 225 MG/1
500 CAPSULE ORAL DAILY
Refills: 0 | Status: DISCONTINUED | OUTPATIENT
Start: 2021-01-12 | End: 2021-01-15

## 2021-01-12 RX ORDER — ACETAMINOPHEN 500 MG
1000 TABLET ORAL ONCE
Refills: 0 | Status: COMPLETED | OUTPATIENT
Start: 2021-01-12 | End: 2021-01-12

## 2021-01-12 RX ORDER — LIDOCAINE 4 G/100G
1 CREAM TOPICAL ONCE
Refills: 0 | Status: COMPLETED | OUTPATIENT
Start: 2021-01-12 | End: 2021-01-12

## 2021-01-12 RX ORDER — ACETAMINOPHEN 500 MG
650 TABLET ORAL ONCE
Refills: 0 | Status: COMPLETED | OUTPATIENT
Start: 2021-01-12 | End: 2021-01-12

## 2021-01-12 RX ORDER — IBUPROFEN 200 MG
400 TABLET ORAL ONCE
Refills: 0 | Status: COMPLETED | OUTPATIENT
Start: 2021-01-12 | End: 2021-01-12

## 2021-01-12 RX ORDER — LANOLIN ALCOHOL/MO/W.PET/CERES
3 CREAM (GRAM) TOPICAL ONCE
Refills: 0 | Status: COMPLETED | OUTPATIENT
Start: 2021-01-12 | End: 2021-01-12

## 2021-01-12 RX ORDER — TETANUS TOXOID, REDUCED DIPHTHERIA TOXOID AND ACELLULAR PERTUSSIS VACCINE, ADSORBED 5; 2.5; 8; 8; 2.5 [IU]/.5ML; [IU]/.5ML; UG/.5ML; UG/.5ML; UG/.5ML
0.5 SUSPENSION INTRAMUSCULAR ONCE
Refills: 0 | Status: COMPLETED | OUTPATIENT
Start: 2021-01-12 | End: 2021-01-12

## 2021-01-12 RX ORDER — ONDANSETRON 8 MG/1
4 TABLET, FILM COATED ORAL ONCE
Refills: 0 | Status: COMPLETED | OUTPATIENT
Start: 2021-01-12 | End: 2021-01-12

## 2021-01-12 RX ORDER — DONEPEZIL HYDROCHLORIDE 10 MG/1
10 TABLET, FILM COATED ORAL AT BEDTIME
Refills: 0 | Status: DISCONTINUED | OUTPATIENT
Start: 2021-01-12 | End: 2021-01-15

## 2021-01-12 RX ORDER — ASPIRIN/CALCIUM CARB/MAGNESIUM 324 MG
81 TABLET ORAL DAILY
Refills: 0 | Status: DISCONTINUED | OUTPATIENT
Start: 2021-01-12 | End: 2021-01-15

## 2021-01-12 RX ORDER — CIPROFLOXACIN LACTATE 400MG/40ML
250 VIAL (ML) INTRAVENOUS
Refills: 0 | Status: DISCONTINUED | OUTPATIENT
Start: 2021-01-12 | End: 2021-01-15

## 2021-01-12 RX ORDER — FUROSEMIDE 40 MG
20 TABLET ORAL
Refills: 0 | Status: DISCONTINUED | OUTPATIENT
Start: 2021-01-12 | End: 2021-01-15

## 2021-01-12 RX ORDER — CHOLECALCIFEROL (VITAMIN D3) 125 MCG
1000 CAPSULE ORAL
Refills: 0 | Status: DISCONTINUED | OUTPATIENT
Start: 2021-01-12 | End: 2021-01-15

## 2021-01-12 RX ORDER — FUROSEMIDE 40 MG
1 TABLET ORAL
Qty: 0 | Refills: 0 | DISCHARGE

## 2021-01-12 RX ADMIN — Medication 250 MILLIGRAM(S): at 17:57

## 2021-01-12 RX ADMIN — Medication 20 MILLIGRAM(S): at 14:10

## 2021-01-12 RX ADMIN — ATORVASTATIN CALCIUM 20 MILLIGRAM(S): 80 TABLET, FILM COATED ORAL at 21:08

## 2021-01-12 RX ADMIN — Medication 100 MILLIGRAM(S): at 07:29

## 2021-01-12 RX ADMIN — LIDOCAINE 1 PATCH: 4 CREAM TOPICAL at 17:48

## 2021-01-12 RX ADMIN — ONDANSETRON 4 MILLIGRAM(S): 8 TABLET, FILM COATED ORAL at 05:30

## 2021-01-12 RX ADMIN — TETANUS TOXOID, REDUCED DIPHTHERIA TOXOID AND ACELLULAR PERTUSSIS VACCINE, ADSORBED 0.5 MILLILITER(S): 5; 2.5; 8; 8; 2.5 SUSPENSION INTRAMUSCULAR at 07:29

## 2021-01-12 RX ADMIN — Medication 2000 MILLIGRAM(S): at 08:45

## 2021-01-12 RX ADMIN — PREGABALIN 500 MICROGRAM(S): 225 CAPSULE ORAL at 14:10

## 2021-01-12 RX ADMIN — Medication 650 MILLIGRAM(S): at 04:17

## 2021-01-12 RX ADMIN — DONEPEZIL HYDROCHLORIDE 10 MILLIGRAM(S): 10 TABLET, FILM COATED ORAL at 21:08

## 2021-01-12 RX ADMIN — Medication 400 MILLIGRAM(S): at 08:45

## 2021-01-12 RX ADMIN — Medication 400 MILLIGRAM(S): at 21:09

## 2021-01-12 RX ADMIN — AMLODIPINE BESYLATE 2.5 MILLIGRAM(S): 2.5 TABLET ORAL at 14:12

## 2021-01-12 RX ADMIN — Medication 1000 UNIT(S): at 14:09

## 2021-01-12 RX ADMIN — Medication 81 MILLIGRAM(S): at 14:10

## 2021-01-12 RX ADMIN — Medication 3 MILLIGRAM(S): at 21:08

## 2021-01-12 RX ADMIN — LIDOCAINE 1 PATCH: 4 CREAM TOPICAL at 08:45

## 2021-01-12 NOTE — CONSULT NOTE ADULT - SUBJECTIVE AND OBJECTIVE BOX
Plastic Surgery Consult Note  (pg [facility]: [id number], NS: [phone])    HPI: 89yo F w/ h/o dementia (unreliable historian), HLD, HTN p/w facial laceration and mirela-orbital swelling after unwitnessed fall, was found down. Thinks she hit her head but denies LOC. Not on AC. Recent pelvic fracture and rehab stay.     Patient seen in ED. Laceration repaired by ED staff. Patient pleasant, comfortable. Denies dizziness, HA, nausea. Minimal pain at this time. Denies diplopia or change in vision.     PAST MEDICAL & SURGICAL HISTORY:  Prediabetes  no med    Lung nodule  right    Lung cancer  left    Dementia    Hypertension    Hyperlipidemia    History of cataract surgery  bilateral    History of back surgery  1990s    S/P lobectomy of lung  s/p FB, mediastinoscopy, LT VATS, COLEMAN lobectomy 2009      Allergies    No Known Allergies    Intolerances      Home Medications:  amLODIPine 2.5 mg oral tablet: 1 tab(s) orally once a day (2021 09:14)  aspirin 81 mg oral tablet: 1 tab(s) orally once a day in am   (2021 09:14)  donepezil 10 mg oral tablet: 1 tab(s) orally once a day (at bedtime) (2021 09:14)  Lasix 20 mg oral tablet: 1 tab(s) orally every other day (2021 09:14)  rosuvastatin 5 mg oral tablet: 1 tab(s) orally once a day (at bedtime) (2021 09:14)  senna oral tablet: 2 tab(s) orally once a day (at bedtime)  Hold for loose stool  (2021 09:14)  Vitamin B12 500 mcg oral tablet: 1 tab(s) orally once a day (2021 09:14)  Vitamin D3 1000 intl units oral capsule: 1 cap(s) orally 2 times a day (2021 09:14)    MEDICATIONS  (STANDING):      SOCIAL HISTORY:  FAMILY HISTORY:  No pertinent family history in first degree relatives        ___________________________________________  OBJECTIVE:  Vital Signs Last 24 Hrs  T(C): 36.9 (2021 06:59), Max: 36.9 (2021 06:59)  T(F): 98.4 (2021 06:59), Max: 98.4 (2021 06:59)  HR: 65 (2021 06:59) (58 - 70)  BP: 138/61 (2021 06:59) (137/62 - 165/55)  BP(mean): 84 (2021 06:59) (84 - 84)  RR: 17 (2021 06:59) (16 - 18)  SpO2: 97% (2021 06:59) (94% - 97%)CAPILLARY BLOOD GLUCOSE        I&O's Detail    General: Well developed, well nourished, NAD  Neuro: Alert and orientedx2-3.           >> VITAL SIGNS: Afebrile. Stable.  >> CONSTITUTIONAL: AOx2-3. NAD.   >> HEENT:        Gross examination of the head is remarkable for ecchymosis and periorbital edema on left.        The upper third of the face unremarkable. EOMI w/o restriction or pain. Visual fields intact. No evidence of enophthalmos or vertical dystopia. No chemosis or subconjunctival hemorrhage.        The middle third of the face demonstrates significant ecchymosis and periorbital edema on left. Minimally tender to palpation, no step offs palpated through swelling. Examination of the ears is unremarkable bilaterally: there is no evidence of otorrhea, hemotympanum, or Weiner’s sign. Intranasal examination is unremarkable bilaterally: there is no evidence of acute or active bleeding or septal hematoma.        The lower third of the face was unremarkable. Per patient when dentures were in denied malocclusion.   >> NECK: Soft. No tenderness to palpation  >> RESPIRATORY: Nonlabored   >> NEUROLOGICAL: Pupils are equal, round, and reactive to light and accommodation bilaterally. Visual fields intact by confrontation bilaterally. Extraocular movements intact to all directions. Facial motor intact and symmetric bilaterally. Facial sensory intact and symmetric bilaterally. CN 8-10 intact. Shoulder shrug equal and symmetric bilaterally. Tongue protrudes in midline. Motor and sensory are grossly intact in bilateral upper and lower extremities.     ____________________________________________  LABS:  CBC Full  -  ( 2021 04:18 )  WBC Count : 12.07 K/uL  RBC Count : 3.74 M/uL  Hemoglobin : 12.0 g/dL  Hematocrit : 37.1 %  Platelet Count - Automated : 242 K/uL  Mean Cell Volume : 99.2 fl  Mean Cell Hemoglobin : 32.1 pg  Mean Cell Hemoglobin Concentration : 32.3 gm/dL  Auto Neutrophil # : 9.89 K/uL  Auto Lymphocyte # : 1.24 K/uL  Auto Monocyte # : 0.67 K/uL  Auto Eosinophil # : 0.11 K/uL  Auto Basophil # : 0.05 K/uL  Auto Neutrophil % : 81.9 %  Auto Lymphocyte % : 10.3 %  Auto Monocyte % : 5.6 %  Auto Eosinophil % : 0.9 %  Auto Basophil % : 0.4 %        138  |  102  |  16  ----------------------------<  102<H>  4.1   |  27  |  0.55    Ca    9.7      2021 04:18    TPro  7.1  /  Alb  3.8  /  TBili  0.5  /  DBili  x   /  AST  26  /  ALT  25  /  AlkPhos  194<H>  12    LIVER FUNCTIONS - ( 2021 04:18 )  Alb: 3.8 g/dL / Pro: 7.1 g/dL / ALK PHOS: 194 U/L / ALT: 25 U/L / AST: 26 U/L / GGT: x             Urinalysis Basic - ( 2021 08:27 )    Color: Yellow / Appearance: Slightly Turbid / S.019 / pH: x  Gluc: x / Ketone: Negative  / Bili: Negative / Urobili: Negative   Blood: x / Protein: Trace / Nitrite: Positive   Leuk Esterase: Moderate / RBC: 3 /hpf / WBC 10 /HPF   Sq Epi: x / Non Sq Epi: 3 /hpf / Bacteria: Many      CARDIAC MARKERS ( 2021 04:18 )  x     / x     / 77 U/L / x     / x            ____________________________________________  MICRO:  RECENT CULTURES:    ____________________________________________  RADIOLOGY:  < from: CT Maxillofacial No Cont (21 @ 05:19) >  IMPRESSION:  1. No acute intracranial hemorrhage, territorial infarct, mass effect or calvarial fracture.  2. Acute comminuted, depressed fractures of the right zygomatic arch and lateral wall of the right orbit with overlying moderate right infraorbital soft tissue swelling extending superficial to the right zygomatic arch and mandibular ramus.  3. Partial opacification of the right mastoid air cells. Correlate for mastoiditis.  4. Multilevel degenerative changes of the cervical spine without evidence of an acute fracture.    < end of copied text >

## 2021-01-12 NOTE — H&P ADULT - PROBLEM SELECTOR PLAN 1
will ask ENT to follow  follow swelling around right eye  continue care of laceration  physical therapy as tolerated

## 2021-01-12 NOTE — ED PROVIDER NOTE - ATTENDING CONTRIBUTION TO CARE
MD Acosta:  patient seen and evaluated personally.   I agree with the History & Physical,  Impression & Plan other than what was detailed in my note.  MD Acosta  87 yo F with pmh of dementia, HLD, HTN, presenting to the ED s/p facial injury to r side of face, aoxx2 at baseline, does not remember exact details of fall other than hitting her face. unsure if loc. Reports bleeding to r side of face as well as pain on this side. Denies blurry vision, loss of vision, denies pain in neck, cp, sob, back pain. afebrile vitals stable, non toxic, does have bruising and lac to r side oface w/ some bleeding. no trauma to occiput or noted elsewhere. no c/t/l/s spine ttp, no chest abd ttp, no extrem ttp. plan for ct head, c spine, max face, suspicion for facial frx given swelling, no s/o occular nerve entrapment. will get basic labs. lac repair, tetanus

## 2021-01-12 NOTE — ED PROVIDER NOTE - PROGRESS NOTE DETAILS
Joseph Frankel PGY2: Patient's laceration sutured. CT with zygomatic arch fracture. EOM intact. Dr. Smith (plastics) consulted. Martha,  PGY-2: Plastics to see patient, admitted to medicine

## 2021-01-12 NOTE — ED ADULT NURSE NOTE - OBJECTIVE STATEMENT
88y F BIBEMS sp fall. per EMS pt has been having frequent falls and was recently discharged from rehab after having a pelvic fx. pt son states to EMS she was going to the bathroom when she slipped and fell hitting her right sided face. on arrival Pt A&oX2 (disoriented to time and situation). placed on monitor. rigth sided facial wound noted with active bleeding with dressing placed by EMS. pt with no complaints other than back pain which she says is chronic. pt does not remember falling. 88y F BIBEMS sp fall. per EMS pt has been having frequent falls and was recently discharged from rehab after having a pelvic fx. pt son states to EMS she was going to the bathroom when she slipped and fell hitting her right sided face. on arrival Pt A&oX2 (disoriented to time and situation). placed on monitor. rigth sided facial wound noted with active bleeding with dressing placed by EMS. pt with no complaints other than back pain which she says is chronic. pt does not remember falling. but per son there was no LOC.

## 2021-01-12 NOTE — ED ADULT TRIAGE NOTE - HEIGHT IN INCHES
Alert-The patient is alert, awake and responds to voice. The patient is oriented to time, place, and person. The triage nurse is able to obtain subjective information.
5

## 2021-01-12 NOTE — ED ADULT NURSE REASSESSMENT NOTE - NS ED NURSE REASSESS COMMENT FT1
Report received from CHRIS Smith. Pt is breathing unlabored on RA. Pt A&O x2, disoriented to year. Speech is clear. Sensation intact. Pt has strong strength in upper and lower extremities bilaterally. + peripheral pulses. Educated pt on plan of care. Safety and comfort maintained. Call bell within reach. ID band in place. Report received from CHRIS Smith. Pt is breathing unlabored on RA. Pt A&O x2, disoriented to year. PERRL, 3 mm b/l. Speech is clear. Sensation intact. Pt has strong strength in upper and lower extremities bilaterally. + peripheral pulses. Educated pt on plan of care. Safety and comfort maintained. Call bell within reach. ID band in place.

## 2021-01-12 NOTE — CONSULT NOTE ADULT - ASSESSMENT
ASSESSMENT: 88F h/o dementia, HLD, HTN w/ recent pelvic fracture s/p unwitnessed fall w/ acute comminuted right zygomatic arch fracture w/ extension to lateral orbital wall     PLAN:  -No immediate surgical intervention required  -Soft diet  -Pain control  -Ice as tolerated  -HOB elevation   -Follow up outpatient with Dr. Smith    Plastic Surgery (pg [facility]: [id number], NS: [phone])  ASSESSMENT: 88F h/o dementia, HLD, HTN w/ recent pelvic fracture s/p unwitnessed fall w/ acute comminuted right zygomatic arch fracture w/ extension to lateral orbital wall     PLAN:  -No immediate surgical intervention required  -Would consider opthalmology consult   -Soft diet  -Pain control  -Ice as tolerated  -HOB elevation   -Follow up outpatient with Dr. Smith    Plastic Surgery (pg [id number]: [id number], NS: [phone])

## 2021-01-12 NOTE — H&P ADULT - NSHPLABSRESULTS_GEN_ALL_CORE
12.0   12.07 )-----------( 242      ( 2021 04:18 )             37.1           138  |  102  |  16  ----------------------------<  102<H>  4.1   |  27  |  0.55    Ca    9.7      2021 04:18    TPro  7.1  /  Alb  3.8  /  TBili  0.5  /  DBili  x   /  AST  26  /  ALT  25  /  AlkPhos  194<H>  01-12              Urinalysis Basic - ( 2021 08:27 )    Color: Yellow / Appearance: Slightly Turbid / S.019 / pH: x  Gluc: x / Ketone: Negative  / Bili: Negative / Urobili: Negative   Blood: x / Protein: Trace / Nitrite: Positive   Leuk Esterase: Moderate / RBC: 3 /hpf / WBC 10 /HPF   Sq Epi: x / Non Sq Epi: 3 /hpf / Bacteria: Many            Lactate Trend      CARDIAC MARKERS ( 2021 04:18 )  x     / x     / 77 U/L / x     / x            CAPILLARY BLOOD GLUCOSE

## 2021-01-12 NOTE — ED ADULT NURSE NOTE - NSIMPLEMENTINTERV_GEN_ALL_ED
Implemented All Fall with Harm Risk Interventions:  [place] to call system. Call bell, personal items and telephone within reach. Instruct patient to call for assistance. Room bathroom lighting operational. Non-slip footwear when patient is off stretcher. Physically safe environment: no spills, clutter or unnecessary equipment. Stretcher in lowest position, wheels locked, appropriate side rails in place. Provide visual cue, wrist band, yellow gown, etc. Monitor gait and stability. Monitor for mental status changes and reorient to person, place, and time. Review medications for side effects contributing to fall risk. Reinforce activity limits and safety measures with patient and family. Provide visual clues: red socks.

## 2021-01-12 NOTE — ED ADULT NURSE REASSESSMENT NOTE - NS ED NURSE REASSESS COMMENT FT1
Pt straight catheterized under sterile technique with 2 RN's at the bedside as per MD order. Pt tolerated well. Approximately 150 mL clear, yellow urine drained. UA/UC sent as per MD order. Safety and comfort maintained. Pt cleaned, new gown/linens applied. Red socks on. Call bell within reach.

## 2021-01-12 NOTE — ED PROVIDER NOTE - PHYSICAL EXAMINATION
Gen: Alert and orientedx 2. Lying comfortably in bed. Answering questions appropriately  HEENT: 1 cm right cheek laceration. raccoon eye on right with swelling around orbit. no tenderness to palpation at neck. extra occular movements intact, no nasal discharge, mucous membranes moist  CV: Regular rate and rhythm, +S1/S2, no murmurs/rubs/gallops,   Resp: Clear to ausculation bilaterally, no wheezes/rhonchi/rales  GI: Abdomen soft non-distended, non tender to palpation, no masses  MSK: No midline tenderness at back. Mild left lower back tenderness. Full ROM and strength of all extremities  Neuro: A&Ox2, following commands, moving all four extremities spontaneously  Psych: appropriate mood

## 2021-01-12 NOTE — ED PROVIDER NOTE - CLINICAL SUMMARY MEDICAL DECISION MAKING FREE TEXT BOX
Joseph Frankel PGY2: 89 yo sp fall. VSS. Patient is non toxic appearing. PE as above. Consider bleed vs bony fracture of skull or face. Fall sounds mechanical but given poor historian will consider infectious vs metabolic causes. Will get imaging, labs, suture laceration. Most likely admit as patient unsafe at home.

## 2021-01-12 NOTE — H&P ADULT - HISTORY OF PRESENT ILLNESS
87 yo F with pmh of dementia, HLD, HTN presenting sp fall with laceration on right cheek. Patient A and Ox2 and is not a reliable historian. Per patient, she hit her face against a wardrobe in her house. Denies falling or LOC. Per son, patient was found down soon after she fell. Thinks she fell and hit her head on wardrobe. Was stating she was cold earlier today which is not unusual for her. Also states that she had increased frequency of urination. Denies any other complaints yesterday. Not on AC. Patient had just come home from rehab after pelvic fracture. Per son, concerned about her safety at home. Pt was found to have a positive UA. as per the Pts son, Patient has had increased urination

## 2021-01-12 NOTE — H&P ADULT - ASSESSMENT
89 yo woman recently admitted for a pelvic fx presents after a fall at home with a laceration on the right side of her head and a zygomatic arch fx. Patient with a positive ua on admission.

## 2021-01-12 NOTE — ED PROVIDER NOTE - NS ED ROS FT
Gen: Denies fever, chills, generalized weakness  CV: Denies chest pain, palpitations  HEENT: Denies neck pain, vision changes  Skin: Denies rash, erythema, color changes  Resp: Denies SOB, cough  GI: Denies constipation, nausea, vomiting, diarrhea  Msk: + left lowr back pain, denies LE swelling, extremity pain  : Denies dysuria, increased frequency  Neuro: Denies LOC, focal weakness, numbness, tingling  Psych: Denies hx of psych, SI, HI

## 2021-01-12 NOTE — H&P ADULT - NSHPREVIEWOFSYSTEMS_GEN_ALL_CORE
REVIEW OF SYSTEMS:  CONSTITUTIONAL: No fever, change in weight, or fatigue  HEAD: No headache, dizziness or recent trauma  EYES: No eye pain, visual disturbances, or discharge  ENT:  No difficulty hearing, tinnitus, vertigo, No sinus or throat pain  NECK: No pain or stiffness  BREASTS: No pain, masses, or nipple discharge  RESPIRATORY: No cough, wheezing, chills or hemoptysis, No shortness of breath at rest or exertional shortness of breath  CARDIOVASCULAR: No chest pain, palpitations, dizziness, CHF, arrhythmia, cardiomegaly or leg swelling  GASTROINTESTINAL: No abdominal or epigastric pain. No nausea, vomiting, or hematemesis, No diarrhea or constipation. No melena or hematochezia.  GENITOURINARY: No dysuria, frequency, hematuria, or incontinence  SKIN: No itching, burning, rashes, or lesions   LYMPH NODES: No history of enlarged glands  ENDOCRINE: No heat or cold intolerance, No hair loss. No osteoporosis or thyroid disease  MUSCULOSKELETAL: No joint pain or swelling, No muscle, back, or extremity pain  PSYCHIATRIC: Hx of dementia  HEME/LYMPH: No easy bruising, anticoagulants, bleeding disorder or bleeding gums  ALLERGY AND IMMUNOLOGIC: No hives or eczema  NEUROLOGICAL: No memory loss, loss of strength, numbness, or tremors

## 2021-01-12 NOTE — ED PROVIDER NOTE - OBJECTIVE STATEMENT
89 yo F with pmh of dementia, HLD, HTN presenting sp fall with laceration on right cheek. Patient AandOx2 and is not a reliable historian. Per patient, she hit her face against a wardrobe in her house. Denies falling or LOC. Per son, patient was found down soon after she fell tonight. Thinks she fell and hit her head on wardrobe. Was stating she was cold earlier today which is not unusual for her. Also states that she had increased frequency of urination. Denies any other complaints yesterday. Not on AC. Patient had just come home from rehab after pelvic fracture. Per son, concerned about her safety at home.

## 2021-01-12 NOTE — H&P ADULT - NSHPPHYSICALEXAM_GEN_ALL_CORE
PHYSICAL EXAM:  GENERAL: NAD, well nourished and conversant  HEAD:  ecchymosis and swelling of the right eye  EYES: EOM, PERRLA, conjunctiva pink and sclera white  ENT: No tonsillar erythema, exudates, or enlargement, moist mucous membranes, good dentition, no lesions  NECK: Supple, No JVD, normal thyroid, carotids with normal upstrokes and no bruits  CHEST/LUNG: Clear to auscultation bilaterally, No rales, rhonchi, wheezing, or rubs  HEART: Regular rate and rhythm, No murmurs, rubs, or gallops  ABDOMEN: Soft, nondistended, no masses, guarding, tenderness or rebound, bowel sounds present  EXTREMITIES:  2+ Peripheral Pulses, No clubbing, cyanosis, or edema.   LYMPH: No lymphadenopathy noted  SKIN: No rashes or lesions  NERVOUS SYSTEM:  Alert & Oriented X1, normal cognitive function. Motor Strength 5/5 right upper and right lower.  5/5 left upper and left lower extremities, DTRs 2+ intact and symmetric

## 2021-01-13 LAB
ANION GAP SERPL CALC-SCNC: 8 MMOL/L — SIGNIFICANT CHANGE UP (ref 5–17)
BUN SERPL-MCNC: 16 MG/DL — SIGNIFICANT CHANGE UP (ref 7–23)
CALCIUM SERPL-MCNC: 9.4 MG/DL — SIGNIFICANT CHANGE UP (ref 8.4–10.5)
CHLORIDE SERPL-SCNC: 104 MMOL/L — SIGNIFICANT CHANGE UP (ref 96–108)
CO2 SERPL-SCNC: 27 MMOL/L — SIGNIFICANT CHANGE UP (ref 22–31)
CREAT SERPL-MCNC: 0.77 MG/DL — SIGNIFICANT CHANGE UP (ref 0.5–1.3)
GLUCOSE SERPL-MCNC: 82 MG/DL — SIGNIFICANT CHANGE UP (ref 70–99)
HCT VFR BLD CALC: 33.1 % — LOW (ref 34.5–45)
HGB BLD-MCNC: 10.7 G/DL — LOW (ref 11.5–15.5)
MCHC RBC-ENTMCNC: 31.8 PG — SIGNIFICANT CHANGE UP (ref 27–34)
MCHC RBC-ENTMCNC: 32.3 GM/DL — SIGNIFICANT CHANGE UP (ref 32–36)
MCV RBC AUTO: 98.5 FL — SIGNIFICANT CHANGE UP (ref 80–100)
NRBC # BLD: 0 /100 WBCS — SIGNIFICANT CHANGE UP (ref 0–0)
PLATELET # BLD AUTO: 206 K/UL — SIGNIFICANT CHANGE UP (ref 150–400)
POTASSIUM SERPL-MCNC: 3.8 MMOL/L — SIGNIFICANT CHANGE UP (ref 3.5–5.3)
POTASSIUM SERPL-SCNC: 3.8 MMOL/L — SIGNIFICANT CHANGE UP (ref 3.5–5.3)
RBC # BLD: 3.36 M/UL — LOW (ref 3.8–5.2)
RBC # FLD: 14.6 % — HIGH (ref 10.3–14.5)
SODIUM SERPL-SCNC: 139 MMOL/L — SIGNIFICANT CHANGE UP (ref 135–145)
WBC # BLD: 5.68 K/UL — SIGNIFICANT CHANGE UP (ref 3.8–10.5)
WBC # FLD AUTO: 5.68 K/UL — SIGNIFICANT CHANGE UP (ref 3.8–10.5)

## 2021-01-13 RX ORDER — LANOLIN ALCOHOL/MO/W.PET/CERES
3 CREAM (GRAM) TOPICAL ONCE
Refills: 0 | Status: COMPLETED | OUTPATIENT
Start: 2021-01-13 | End: 2021-01-13

## 2021-01-13 RX ADMIN — ATORVASTATIN CALCIUM 20 MILLIGRAM(S): 80 TABLET, FILM COATED ORAL at 21:34

## 2021-01-13 RX ADMIN — PREGABALIN 500 MICROGRAM(S): 225 CAPSULE ORAL at 11:43

## 2021-01-13 RX ADMIN — DONEPEZIL HYDROCHLORIDE 10 MILLIGRAM(S): 10 TABLET, FILM COATED ORAL at 21:34

## 2021-01-13 RX ADMIN — Medication 1000 UNIT(S): at 17:08

## 2021-01-13 RX ADMIN — Medication 250 MILLIGRAM(S): at 06:36

## 2021-01-13 RX ADMIN — Medication 250 MILLIGRAM(S): at 17:08

## 2021-01-13 RX ADMIN — Medication 3 MILLIGRAM(S): at 21:34

## 2021-01-13 RX ADMIN — Medication 81 MILLIGRAM(S): at 11:43

## 2021-01-13 RX ADMIN — AMLODIPINE BESYLATE 2.5 MILLIGRAM(S): 2.5 TABLET ORAL at 06:36

## 2021-01-13 RX ADMIN — Medication 1000 UNIT(S): at 06:36

## 2021-01-13 NOTE — PHYSICAL THERAPY INITIAL EVALUATION ADULT - CRITERIA FOR SKILLED THERAPEUTIC INTERVENTIONS
subacute rehab if pt goes home home PT for bed mobs, transfers, amb training, balance training and ther exercises and assist with ambulation and ADL's/impairments found/anticipated discharge recommendation

## 2021-01-13 NOTE — PHYSICAL THERAPY INITIAL EVALUATION ADULT - ADDITIONAL COMMENTS
lives in private home w/ / 6 steps to enter, pt uses rolling walker w/ assist of , pt has tub bench and grab bars in the bathroom, pt uses glasses for reading, hearing good /pt is R handed.

## 2021-01-13 NOTE — PHYSICAL THERAPY INITIAL EVALUATION ADULT - GENERAL OBSERVATIONS, REHAB EVAL
Rec'd in bed, R side cheek w/ ecchymosis and dressing to same, + R UE IV lock, Ext female cath, c/o back pain and needing her dentures to eat, agreeable to PT

## 2021-01-13 NOTE — PHYSICAL THERAPY INITIAL EVALUATION ADULT - ACTIVE RANGE OF MOTION EXAMINATION, REHAB EVAL
except B/L sh to 150/bilateral upper extremity Active ROM was WFL (within functional limits)/bilateral  lower extremity Active ROM was WFL (within functional limits)

## 2021-01-13 NOTE — PHYSICAL THERAPY INITIAL EVALUATION ADULT - PERTINENT HX OF CURRENT PROBLEM, REHAB EVAL
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

## 2021-01-13 NOTE — PHYSICAL THERAPY INITIAL EVALUATION ADULT - IMPAIRMENTS CONTRIBUTING IMPAIRED BED MOBILITY, REHAB EVAL
back pain/impaired balance/cognition/impaired motor control/pain/impaired postural control/decreased strength

## 2021-01-14 PROCEDURE — 99223 1ST HOSP IP/OBS HIGH 75: CPT

## 2021-01-14 RX ORDER — ACETAMINOPHEN 500 MG
650 TABLET ORAL ONCE
Refills: 0 | Status: COMPLETED | OUTPATIENT
Start: 2021-01-14 | End: 2021-01-14

## 2021-01-14 RX ADMIN — ATORVASTATIN CALCIUM 20 MILLIGRAM(S): 80 TABLET, FILM COATED ORAL at 22:34

## 2021-01-14 RX ADMIN — Medication 250 MILLIGRAM(S): at 05:17

## 2021-01-14 RX ADMIN — Medication 1000 UNIT(S): at 18:06

## 2021-01-14 RX ADMIN — Medication 81 MILLIGRAM(S): at 10:52

## 2021-01-14 RX ADMIN — AMLODIPINE BESYLATE 2.5 MILLIGRAM(S): 2.5 TABLET ORAL at 05:17

## 2021-01-14 RX ADMIN — DONEPEZIL HYDROCHLORIDE 10 MILLIGRAM(S): 10 TABLET, FILM COATED ORAL at 22:35

## 2021-01-14 RX ADMIN — PREGABALIN 500 MICROGRAM(S): 225 CAPSULE ORAL at 10:52

## 2021-01-14 RX ADMIN — Medication 1000 UNIT(S): at 05:18

## 2021-01-14 RX ADMIN — Medication 250 MILLIGRAM(S): at 18:05

## 2021-01-14 RX ADMIN — Medication 650 MILLIGRAM(S): at 22:35

## 2021-01-14 NOTE — CONSULT NOTE ADULT - ASSESSMENT
Assessment and Recommendations:  88y female w/ dementia, HLD, HTN came w/ fall. AAO x 1. BCVA 20/40 OU, no APD, EOM full, CVF, IOP wnl. Anterior exam w/ RUL/RLL ecchymosis, no edema. DFE wnl.     #lateral wall fracture  - HOB elevation  - ice packs as needed  - plastics evaluated, f/u outpt for further mgmt  - no EOM limitation, IOP excellent    SDW Dr. Olivas (attending)    Outpatient follow-up: Patient should follow-up with his/her ophthalmologist or with [facility] Department of Ophthalmology within 1 week of after discharge at:    [address]  [place] [address]  [phone]    Emre Mccormack MD, PGY-3  Pager: [phone]/[facility]: [phone] Assessment and Recommendations:  88y female w/ dementia, HLD, HTN came w/ fall. AAO x 1. BCVA 20/40 OU, no APD, EOM full, CVF, IOP wnl. Anterior exam w/ RUL/RLL ecchymosis, no edema. DFE wnl.     #Right sided lateral wall fracture- no evidence of muscle entrapment, IOP excellent  - HOB elevation  - ice packs as needed for the first 24-48 hours  - plastics evaluated, f/u outpt for further mgmt of fracture  - abx per primary team   - findings and plan discussed with patient and primary team    SDW Dr. Olivas (attending)    Outpatient follow-up: Patient should follow-up with his/her ophthalmologist or with [facility] Department of Ophthalmology within 1 week of after discharge, sooner if symptoms worsen or change at:    [address]  [place] [address]  [phone]    Emre Mccormack MD, PGY-3  Pager: [phone]/[facility]: [phone]

## 2021-01-14 NOTE — CONSULT NOTE ADULT - SUBJECTIVE AND OBJECTIVE BOX
[facility] DEPARTMENT OF OPHTHALMOLOGY - INITIAL ADULT CONSULT  -----------------------------------------------------------------------------  Emre Mccormack MD PGY-3  Pager: [phone]/[facility]: [phone]  -----------------------------------------------------------------------------    HPI:   89 yo F with pmh of dementia, HLD, HTN presenting sp fall with laceration on right cheek. Patient A and Ox2 and is not a reliable historian. Per patient, she hit her face against a wardrobe in her house. Denies falling or LOC. Per son, patient was found down soon after she fell. Thinks she fell and hit her head on wardrobe. Was stating she was cold earlier today which is not unusual for her. Also states that she had increased frequency of urination. Denies any other complaints yesterday. Not on AC. Patient had just come home from rehab after pelvic fracture. Per son, concerned about her safety at home. Pt was found to have a positive UA. as per the Pts son, Patient has had increased urination (12 Jan 2021 13:33)    Interval History: denies vision changes, when patient was seen she didn't know why she was in the hospital and did not know she had fell. Denies surgical history but on exam pt is s/p CE/IOL OU    PMH: as above  POcHx: IOL OU  FH: denies glc/amd  Social History: denies etoh/tobacco  Ophthalmic Medications: none  Allergies: NKDA    Review of Systems:  Constitutional: No fever, chills  Eyes: No blurry vision, flashes, floaters, FBS, erythema, discharge, double vision, OU  Neuro: No tremors  Cardiovascular: No chest pain, palpitations  Respiratory: No SOB, no cough  GI: No nausea, vomiting, abdominal pain  : No dysuria  Skin: no rash  Psych: no depression  Endocrine: no polyuria, polydipsia  Heme/lymph: no swelling    VITALS: T(C): 36.9 (01-14-21 @ 16:35)  T(F): 98.5 (01-14-21 @ 16:35), Max: 98.6 (01-13-21 @ 19:15)  HR: 63 (01-14-21 @ 16:35) (58 - 71)  BP: 112/65 (01-14-21 @ 16:35) (110/66 - 149/76)  RR:  (18 - 18)  SpO2:  (95% - 99%)  Wt(kg): --  General: AAO x 1, appropriate mood and affect    Ophthalmology Exam:  Visual acuity (sc): 20/50 OU ph 20/40 OU  Pupils: PERRL OU, no APD  Ttono: 14 OU  Extraocular movements (EOMs): Full OU, no pain, no diplopia  Confrontational Visual Field (CVF): Full OU    Pen Light Exam (PLE)  External: large hematoma over right upper cheek.   Lids/Lashes/Lacrimal Ducts: RUL/RLL ecchymosis, inferiorly the ecchymosis extends to the hematoma, no edema and pt opening eye normally, no laceration. OS flat  Sclera/Conjunctiva: W+Q OU  Cornea: Cl OU  Anterior Chamber: D+F OU    Iris: Flat OU  Lens: Cl OU    Fundus Exam: dilated with 1% tropicamide and 2.5% phenylephrine  Approval obtained from primary team for dilation  Patient aware that pupils can remained dilated for at least 4-6 hours  Exam performed with 20D lens    Vitreous: wnl OU  Disc, cup/disc: sharp and pink, 0.1 OU  Macula: wnl OU  Vessels: wnl OU  Periphery: wnl OU    Labs/Imaging:  < from: CT Maxillofacial No Cont (01.12.21 @ 05:19) >  IMPRESSION:  1. No acute intracranial hemorrhage, territorial infarct, mass effect or calvarial fracture.  2. Acute comminuted, depressed fractures of the right zygomatic arch and lateral wall of the right orbit with overlying moderate right infraorbital soft tissue swelling extending superficial to the right zygomatic arch and mandibular ramus.  3. Partial opacification of the right mastoid air cells. Correlate for mastoiditis.  4. Multilevel degenerative changes of the cervical spine without evidence of an acute fracture.    < end of copied text >   [facility] DEPARTMENT OF OPHTHALMOLOGY - INITIAL ADULT CONSULT  -----------------------------------------------------------------------------  Emre Mccormack MD PGY-3  Pager: [phone]/[facility]: [phone]  -----------------------------------------------------------------------------    HPI:   87 yo F with pmh of dementia, HLD, HTN presenting sp fall with laceration on right cheek. Patient A and Ox2 and is not a reliable historian. Per patient, she hit her face against a wardrobe in her house. Denies falling or LOC. Per son, patient was found down soon after she fell. Thinks she fell and hit her head on wardrobe. Was stating she was cold earlier today which is not unusual for her. Also states that she had increased frequency of urination. Denies any other complaints yesterday. Not on AC. Patient had just come home from rehab after pelvic fracture. Per son, concerned about her safety at home. Pt was found to have a positive UA. as per the Pts son, Patient has had increased urination (12 Jan 2021 13:33)    Interval History: denies vision changes, when patient was seen she didn't know why she was in the hospital and did not know she had fell. Denies surgical history but on exam pt is s/p CE/IOL OU.  Patient has no eye complaints, no pain, no change in vision, no double vision, no flashes/floaters.    PMH: as above  POcHx: IOL OU  FH: denies glc/amd  Social History: denies etoh/tobacco  Ophthalmic Medications: none  Allergies: NKDA    Review of Systems:  Constitutional: No fever, chills  Eyes: No blurry vision, flashes, floaters, FBS, erythema, discharge, double vision, OU  Neuro: No tremors  Cardiovascular: No chest pain, palpitations  Respiratory: No SOB, no cough  GI: No nausea, vomiting, abdominal pain  : No dysuria  Skin: no rash  Psych: no depression  Endocrine: no polyuria, polydipsia  Heme/lymph: no swelling    VITALS: T(C): 36.9 (01-14-21 @ 16:35)  T(F): 98.5 (01-14-21 @ 16:35), Max: 98.6 (01-13-21 @ 19:15)  HR: 63 (01-14-21 @ 16:35) (58 - 71)  BP: 112/65 (01-14-21 @ 16:35) (110/66 - 149/76)  RR:  (18 - 18)  SpO2:  (95% - 99%)  Wt(kg): --  General: AAO x 1, appropriate mood and affect    Ophthalmology Exam:  Visual acuity (sc): 20/50 OU ph 20/40 OU  Pupils: PERRL OU, no APD  Ttono: 14 OU  Extraocular movements (EOMs): Full OU, no pain, no diplopia  Confrontational Visual Field (CVF): Full OU    Pen Light Exam (PLE)  External: large hematoma over right upper cheek.   Lids/Lashes/Lacrimal Ducts: RUL/RLL ecchymosis, inferiorly the ecchymosis extends to the hematoma, no edema and pt opening eye normally, no laceration. OS flat  Sclera/Conjunctiva: W+Q OU  Cornea: Cl OU  Anterior Chamber: D+F OU    Iris: Flat OU  Lens: Cl OU    Fundus Exam: dilated with 1% tropicamide and 2.5% phenylephrine  Approval obtained from primary team for dilation  Patient aware that pupils can remained dilated for at least 4-6 hours  Exam performed with 20D lens    Vitreous: wnl OU  Disc, cup/disc: sharp and pink, 0.1 OU  Macula: wnl OU  Vessels: wnl OU  Periphery: wnl OU    Labs/Imaging:  < from: CT Maxillofacial No Cont (01.12.21 @ 05:19) >  IMPRESSION:  1. No acute intracranial hemorrhage, territorial infarct, mass effect or calvarial fracture.  2. Acute comminuted, depressed fractures of the right zygomatic arch and lateral wall of the right orbit with overlying moderate right infraorbital soft tissue swelling extending superficial to the right zygomatic arch and mandibular ramus.  3. Partial opacification of the right mastoid air cells. Correlate for mastoiditis.  4. Multilevel degenerative changes of the cervical spine without evidence of an acute fracture.    < end of copied text >

## 2021-01-14 NOTE — CONSULT NOTE ADULT - ATTENDING COMMENTS
I have interviewed and examined the patient and reviewed the residents note including the history, exam, assessment, and plan.  I agree with the residents assessment and plan.    88y female w/ dementia, HLD, HTN came w/ fall. AAO x 1. BCVA 20/40 OU, no APD, EOM full, CVF, IOP wnl. Anterior exam w/ RUL/RLL ecchymosis, no edema. DFE wnl.     #Right sided lateral wall fracture- no evidence of muscle entrapment, IOP excellent  - HOB elevation  - ice packs as needed for the first 24-48 hours  - plastics evaluated, f/u outpt for further mgmt of fracture  - abx per primary team   - findings and plan discussed with patient and primary team    Luly Olivas MD

## 2021-01-15 ENCOUNTER — TRANSCRIPTION ENCOUNTER (OUTPATIENT)
Age: 86
End: 2021-01-15

## 2021-01-15 VITALS
OXYGEN SATURATION: 93 % | DIASTOLIC BLOOD PRESSURE: 67 MMHG | SYSTOLIC BLOOD PRESSURE: 135 MMHG | TEMPERATURE: 98 F | RESPIRATION RATE: 18 BRPM | HEART RATE: 63 BPM

## 2021-01-15 LAB
-  AMIKACIN: SIGNIFICANT CHANGE UP
-  AMOXICILLIN/CLAVULANIC ACID: SIGNIFICANT CHANGE UP
-  AMPICILLIN/SULBACTAM: SIGNIFICANT CHANGE UP
-  AMPICILLIN: SIGNIFICANT CHANGE UP
-  AZTREONAM: SIGNIFICANT CHANGE UP
-  CEFAZOLIN: SIGNIFICANT CHANGE UP
-  CEFEPIME: SIGNIFICANT CHANGE UP
-  CEFOXITIN: SIGNIFICANT CHANGE UP
-  CEFTRIAXONE: SIGNIFICANT CHANGE UP
-  CIPROFLOXACIN: SIGNIFICANT CHANGE UP
-  ERTAPENEM: SIGNIFICANT CHANGE UP
-  GENTAMICIN: SIGNIFICANT CHANGE UP
-  IMIPENEM: SIGNIFICANT CHANGE UP
-  LEVOFLOXACIN: SIGNIFICANT CHANGE UP
-  MEROPENEM: SIGNIFICANT CHANGE UP
-  NITROFURANTOIN: SIGNIFICANT CHANGE UP
-  PIPERACILLIN/TAZOBACTAM: SIGNIFICANT CHANGE UP
-  TIGECYCLINE: SIGNIFICANT CHANGE UP
-  TOBRAMYCIN: SIGNIFICANT CHANGE UP
-  TRIMETHOPRIM/SULFAMETHOXAZOLE: SIGNIFICANT CHANGE UP
ANION GAP SERPL CALC-SCNC: 10 MMOL/L — SIGNIFICANT CHANGE UP (ref 5–17)
BUN SERPL-MCNC: 21 MG/DL — SIGNIFICANT CHANGE UP (ref 7–23)
CALCIUM SERPL-MCNC: 9.5 MG/DL — SIGNIFICANT CHANGE UP (ref 8.4–10.5)
CHLORIDE SERPL-SCNC: 102 MMOL/L — SIGNIFICANT CHANGE UP (ref 96–108)
CO2 SERPL-SCNC: 26 MMOL/L — SIGNIFICANT CHANGE UP (ref 22–31)
CREAT SERPL-MCNC: 0.61 MG/DL — SIGNIFICANT CHANGE UP (ref 0.5–1.3)
CULTURE RESULTS: SIGNIFICANT CHANGE UP
GLUCOSE SERPL-MCNC: 87 MG/DL — SIGNIFICANT CHANGE UP (ref 70–99)
HCT VFR BLD CALC: 31.5 % — LOW (ref 34.5–45)
HGB BLD-MCNC: 10.3 G/DL — LOW (ref 11.5–15.5)
MCHC RBC-ENTMCNC: 32.5 PG — SIGNIFICANT CHANGE UP (ref 27–34)
MCHC RBC-ENTMCNC: 32.7 GM/DL — SIGNIFICANT CHANGE UP (ref 32–36)
MCV RBC AUTO: 99.4 FL — SIGNIFICANT CHANGE UP (ref 80–100)
METHOD TYPE: SIGNIFICANT CHANGE UP
NRBC # BLD: 0 /100 WBCS — SIGNIFICANT CHANGE UP (ref 0–0)
ORGANISM # SPEC MICROSCOPIC CNT: SIGNIFICANT CHANGE UP
ORGANISM # SPEC MICROSCOPIC CNT: SIGNIFICANT CHANGE UP
PLATELET # BLD AUTO: 196 K/UL — SIGNIFICANT CHANGE UP (ref 150–400)
POTASSIUM SERPL-MCNC: 3.8 MMOL/L — SIGNIFICANT CHANGE UP (ref 3.5–5.3)
POTASSIUM SERPL-SCNC: 3.8 MMOL/L — SIGNIFICANT CHANGE UP (ref 3.5–5.3)
RBC # BLD: 3.17 M/UL — LOW (ref 3.8–5.2)
RBC # FLD: 14.6 % — HIGH (ref 10.3–14.5)
SODIUM SERPL-SCNC: 138 MMOL/L — SIGNIFICANT CHANGE UP (ref 135–145)
SPECIMEN SOURCE: SIGNIFICANT CHANGE UP
WBC # BLD: 6.58 K/UL — SIGNIFICANT CHANGE UP (ref 3.8–10.5)
WBC # FLD AUTO: 6.58 K/UL — SIGNIFICANT CHANGE UP (ref 3.8–10.5)

## 2021-01-15 PROCEDURE — 97161 PT EVAL LOW COMPLEX 20 MIN: CPT

## 2021-01-15 PROCEDURE — 87186 SC STD MICRODIL/AGAR DIL: CPT

## 2021-01-15 PROCEDURE — 90715 TDAP VACCINE 7 YRS/> IM: CPT

## 2021-01-15 PROCEDURE — 70486 CT MAXILLOFACIAL W/O DYE: CPT

## 2021-01-15 PROCEDURE — 71045 X-RAY EXAM CHEST 1 VIEW: CPT

## 2021-01-15 PROCEDURE — 76377 3D RENDER W/INTRP POSTPROCES: CPT

## 2021-01-15 PROCEDURE — 81001 URINALYSIS AUTO W/SCOPE: CPT

## 2021-01-15 PROCEDURE — 80053 COMPREHEN METABOLIC PANEL: CPT

## 2021-01-15 PROCEDURE — U0003: CPT

## 2021-01-15 PROCEDURE — 90471 IMMUNIZATION ADMIN: CPT

## 2021-01-15 PROCEDURE — 84484 ASSAY OF TROPONIN QUANT: CPT

## 2021-01-15 PROCEDURE — 72125 CT NECK SPINE W/O DYE: CPT

## 2021-01-15 PROCEDURE — 12011 RPR F/E/E/N/L/M 2.5 CM/<: CPT

## 2021-01-15 PROCEDURE — 80048 BASIC METABOLIC PNL TOTAL CA: CPT

## 2021-01-15 PROCEDURE — 96375 TX/PRO/DX INJ NEW DRUG ADDON: CPT | Mod: XU

## 2021-01-15 PROCEDURE — U0005: CPT

## 2021-01-15 PROCEDURE — 93005 ELECTROCARDIOGRAM TRACING: CPT | Mod: XU

## 2021-01-15 PROCEDURE — 70450 CT HEAD/BRAIN W/O DYE: CPT

## 2021-01-15 PROCEDURE — 51701 INSERT BLADDER CATHETER: CPT | Mod: XU

## 2021-01-15 PROCEDURE — 96374 THER/PROPH/DIAG INJ IV PUSH: CPT | Mod: XU

## 2021-01-15 PROCEDURE — 85027 COMPLETE CBC AUTOMATED: CPT

## 2021-01-15 PROCEDURE — 85025 COMPLETE CBC W/AUTO DIFF WBC: CPT

## 2021-01-15 PROCEDURE — 72170 X-RAY EXAM OF PELVIS: CPT

## 2021-01-15 PROCEDURE — 99285 EMERGENCY DEPT VISIT HI MDM: CPT | Mod: 25

## 2021-01-15 PROCEDURE — 82550 ASSAY OF CK (CPK): CPT

## 2021-01-15 PROCEDURE — 87086 URINE CULTURE/COLONY COUNT: CPT

## 2021-01-15 RX ORDER — POTASSIUM CHLORIDE 20 MEQ
20 PACKET (EA) ORAL ONCE
Refills: 0 | Status: COMPLETED | OUTPATIENT
Start: 2021-01-15 | End: 2021-01-15

## 2021-01-15 RX ORDER — ACETAMINOPHEN 500 MG
650 TABLET ORAL ONCE
Refills: 0 | Status: COMPLETED | OUTPATIENT
Start: 2021-01-15 | End: 2021-01-15

## 2021-01-15 RX ORDER — CIPROFLOXACIN LACTATE 400MG/40ML
1 VIAL (ML) INTRAVENOUS
Qty: 4 | Refills: 0
Start: 2021-01-15 | End: 2021-01-16

## 2021-01-15 RX ORDER — ACETAMINOPHEN 500 MG
650 TABLET ORAL EVERY 6 HOURS
Refills: 0 | Status: DISCONTINUED | OUTPATIENT
Start: 2021-01-15 | End: 2021-01-15

## 2021-01-15 RX ADMIN — Medication 20 MILLIGRAM(S): at 05:28

## 2021-01-15 RX ADMIN — Medication 650 MILLIGRAM(S): at 10:12

## 2021-01-15 RX ADMIN — Medication 20 MILLIEQUIVALENT(S): at 08:47

## 2021-01-15 RX ADMIN — AMLODIPINE BESYLATE 2.5 MILLIGRAM(S): 2.5 TABLET ORAL at 05:28

## 2021-01-15 RX ADMIN — Medication 81 MILLIGRAM(S): at 11:37

## 2021-01-15 RX ADMIN — Medication 650 MILLIGRAM(S): at 05:28

## 2021-01-15 RX ADMIN — PREGABALIN 500 MICROGRAM(S): 225 CAPSULE ORAL at 11:38

## 2021-01-15 RX ADMIN — Medication 250 MILLIGRAM(S): at 05:28

## 2021-01-15 RX ADMIN — Medication 1000 UNIT(S): at 05:28

## 2021-01-15 NOTE — PROGRESS NOTE ADULT - ASSESSMENT
89 yo woman recently admitted for a pelvic fx presents after a fall at home with a laceration on the right side of her head and a zygomatic arch fx. Patient with a positive ua on admission. 
87 yo woman recently admitted for a pelvic fx presents after a fall at home with a laceration on the right side of her head and a zygomatic arch fx. Patient with a positive ua on admission. 
89 yo woman recently admitted for a pelvic fx presents after a fall at home with a laceration on the right side of her head and a zygomatic arch fx. Patient with a positive ua on admission.

## 2021-01-15 NOTE — DISCHARGE NOTE NURSING/CASE MANAGEMENT/SOCIAL WORK - PATIENT PORTAL LINK FT
You can access the FollowMyHealth Patient Portal offered by [facility] by registering at the following website: [email or web address]. By joining [facility]’s FollowMyHealth portal, you will also be able to view your health information using other applications (apps) compatible with our system.

## 2021-01-15 NOTE — DISCHARGE NOTE PROVIDER - HOSPITAL COURSE
89 yo F with pmh of dementia, HLD, HTN presenting sp unwitnessed fall with facial injury. At baseline- A&O x 2.  Patient recently discharged from rehab after pelvic fracture. Per patient, she hit her face against a wardrobe in her house. Denies falling or LOC. Per son, patient was found down soon after she fell. Thinks she fell and hit her head on wardrobe. Pt admitted with facial  laceration on the right side of her head and scans noted a zygomatic arch fx. Opthalmology consult appreciated- noted no evidence of muscle entrapment, IOP excellent- ice packs as applied to the site for the first 24-48 hours.  Out patient followup to be scheduled by family member in 1week with ophthalmologist or with [facility] Department of Ophthalmology within 1 week of after discharge, sooner if symptoms worsen or change.  The patient's HCP, her son, also noted that she had increased frequency of urination. Pt was found to have a positive UA and was started on a 5 day course of Cipro, pending cultures.  Physical Therapy saw and evaluated the pt and documented impaired motor control; impaired postural control; decreased strength- recommends PT at a [facility] for conditioning and training.

## 2021-01-15 NOTE — DISCHARGE NOTE PROVIDER - CARE PROVIDER_API CALL
Luly Olivas (MD)  Ophthalmology  [address] [place] [address] [address]  [place] [address]  Phone: [phone]  Fax: [phone]  Follow Up Time: 1 week

## 2021-01-15 NOTE — PROVIDER CONTACT NOTE (OTHER) - ACTION/TREATMENT ORDERED:
NGUYEN Strickland made aware and notified. Another order tylenol po when 4hours has past. no other pain meds ordered at this time.

## 2021-01-15 NOTE — DISCHARGE NOTE NURSING/CASE MANAGEMENT/SOCIAL WORK - NSDCVIVACCINE_GEN_ALL_CORE_FT
Influenza , 2020/12/4 13:12 , Amanda Naranjo (RN)  Tdap , 2020/6/19 13:50 , Grayson Figueredo (RN)  Tdap , 2021/1/12 07:29 , Esther Mcallister (RN)

## 2021-01-15 NOTE — PROGRESS NOTE ADULT - PROBLEM SELECTOR PLAN 4
reorient Patient as needed  continue Aricept

## 2021-01-15 NOTE — DISCHARGE NOTE PROVIDER - NSDCMRMEDTOKEN_GEN_ALL_CORE_FT
amLODIPine 2.5 mg oral tablet: 1 tab(s) orally once a day  aspirin 81 mg oral tablet: 1 tab(s) orally once a day in am    donepezil 10 mg oral tablet: 1 tab(s) orally once a day (at bedtime)  Lasix 20 mg oral tablet: 1 tab(s) orally every other day  rosuvastatin 5 mg oral tablet: 1 tab(s) orally once a day (at bedtime)  senna oral tablet: 2 tab(s) orally once a day (at bedtime)  Hold for loose stool   Vitamin B12 500 mcg oral tablet: 1 tab(s) orally once a day  Vitamin D3 1000 intl units oral capsule: 1 cap(s) orally 2 times a day

## 2021-01-15 NOTE — DISCHARGE NOTE PROVIDER - NSDCCPCAREPLAN_GEN_ALL_CORE_FT
PRINCIPAL DISCHARGE DIAGNOSIS  Diagnosis: Zygomatic arch fracture  Assessment and Plan of Treatment: Improved- Ice pack to area as needed to decrease swelling   Follow up with Opthalmology out patient 1 week after discharge  Pain management as needed      SECONDARY DISCHARGE DIAGNOSES  Diagnosis: UTI (urinary tract infection)  Assessment and Plan of Treatment: Complete antibiotics as prescribed  Increased oral intake  If symptoms such as increased urination, urgency, foul smelling or temp > 100.4 (not controlled by Tylenol) .  Please call your PMD    Diagnosis: Dementia  Assessment and Plan of Treatment: continue Aricept  Maintain safety

## 2021-01-15 NOTE — PROGRESS NOTE ADULT - PROBLEM SELECTOR PLAN 1
will ask ENT to follow  follow swelling around right eye  continue care of laceration  physical therapy as tolerated
Patient seen by ophtho. Patient to follow up as an outpt  continue wound care around right eye  Ice to decrease swelling    physical therapy as tolerated
will ask ENT to follow  follow swelling around right eye  continue care of laceration  physical therapy as tolerated

## 2021-01-15 NOTE — PROGRESS NOTE ADULT - PROBLEM SELECTOR PLAN 3
continue small dose of amlodipine  will adjust meds as needed

## 2021-01-15 NOTE — PROGRESS NOTE ADULT - SUBJECTIVE AND OBJECTIVE BOX
87 yo F with pmh of dementia, HLD, HTN presenting sp fall with laceration on right cheek. Patient A and Ox2 and is not a reliable historian. Per patient, she hit her face against a wardrobe in her house. Denies falling or LOC. Per son, patient was found down soon after she fell. Thinks she fell and hit her head on wardrobe. Was stating she was cold earlier today which is not unusual for her. Also states that she had increased frequency of urination. Denies any other complaints yesterday. Not on AC. Patient had just come home from rehab after pelvic fracture. Per son, concerned about her safety at home. Pt was found to have a positive UA. as per the Pts son, Patient has had increased urination. Patient seen resting comfortably. Pty continue to be confused at times. Py seen sitting up in bed eating breakfast       MEDICATIONS  (STANDING):  amLODIPine   Tablet 2.5 milliGRAM(s) Oral daily  aspirin  chewable 81 milliGRAM(s) Oral daily  atorvastatin 20 milliGRAM(s) Oral at bedtime  cholecalciferol 1000 Unit(s) Oral two times a day  ciprofloxacin     Tablet 250 milliGRAM(s) Oral two times a day  cyanocobalamin 500 MICROGram(s) Oral daily  donepezil 10 milliGRAM(s) Oral at bedtime  furosemide    Tablet 20 milliGRAM(s) Oral <User Schedule>    MEDICATIONS  (PRN):  acetaminophen   Tablet .. 650 milliGRAM(s) Oral every 6 hours PRN Mild Pain (1 - 3), Moderate Pain (4 - 6), Severe Pain (7 - 10)          VITALS:   T(C): 36.4 (01-15-21 @ 05:26), Max: 36.9 (01-14-21 @ 16:35)  HR: 60 (01-15-21 @ 05:26) (60 - 69)  BP: 155/64 (01-15-21 @ 05:26) (112/65 - 155/64)  RR: 18 (01-15-21 @ 05:26) (18 - 18)  SpO2: 93% (01-15-21 @ 05:26) (93% - 96%)  Wt(kg): --     PHYSICAL EXAM:  GENERAL: NAD, well nourished and conversant  HEAD:  ecchymosis and swelling of the right eye  EYES: EOM, PERRLA, conjunctiva pink and sclera white  ENT: No tonsillar erythema, exudates, or enlargement, moist mucous membranes, good dentition, no lesions  NECK: Supple, No JVD, normal thyroid, carotids with normal upstrokes and no bruits  CHEST/LUNG: Clear to auscultation bilaterally, No rales, rhonchi, wheezing, or rubs  HEART: Regular rate and rhythm, No murmurs, rubs, or gallops  ABDOMEN: Soft, nondistended, no masses, guarding, tenderness or rebound, bowel sounds present  EXTREMITIES:  2+ Peripheral Pulses, No clubbing, cyanosis, or edema.   LYMPH: No lymphadenopathy noted  SKIN: No rashes or lesions  NERVOUS SYSTEM:  Alert & Oriented X1, normal cognitive function. Motor Strength 5/5 right upper and right lower.  5/5 left upper and left lower extremities, DTRs 2+ intact and symmetric    LABS:        CBC Full  -  ( 15 Alvino 2021 07:03 )  WBC Count : 6.58 K/uL  RBC Count : 3.17 M/uL  Hemoglobin : 10.3 g/dL  Hematocrit : 31.5 %  Platelet Count - Automated : 196 K/uL  Mean Cell Volume : 99.4 fl  Mean Cell Hemoglobin : 32.5 pg  Mean Cell Hemoglobin Concentration : 32.7 gm/dL  Auto Neutrophil # : x  Auto Lymphocyte # : x  Auto Monocyte # : x  Auto Eosinophil # : x  Auto Basophil # : x  Auto Neutrophil % : x  Auto Lymphocyte % : x  Auto Monocyte % : x  Auto Eosinophil % : x  Auto Basophil % : x    01-15    138  |  102  |  21  ----------------------------<  87  3.8   |  26  |  0.61    Ca    9.5      15 Alvino 2021 07:00            CAPILLARY BLOOD GLUCOSE          RADIOLOGY & ADDITIONAL TESTS:      
 89 yo F with pmh of dementia, HLD, HTN presenting sp fall with laceration on right cheek. Patient A and Ox2 and is not a reliable historian. Per patient, she hit her face against a wardrobe in her house. Denies falling or LOC. Per son, patient was found down soon after she fell. Thinks she fell and hit her head on wardrobe. Was stating she was cold earlier today which is not unusual for her. Also states that she had increased frequency of urination. Denies any other complaints yesterday. Not on AC. Patient had just come home from rehab after pelvic fracture. Per son, concerned about her safety at home. Pt was found to have a positive UA. as per the Pts son, Patient has had increased urination. Patient seen resting comfortably.     MEDICATIONS  (STANDING):  amLODIPine   Tablet 2.5 milliGRAM(s) Oral daily  aspirin  chewable 81 milliGRAM(s) Oral daily  atorvastatin 20 milliGRAM(s) Oral at bedtime  cholecalciferol 1000 Unit(s) Oral two times a day  ciprofloxacin     Tablet 250 milliGRAM(s) Oral two times a day  cyanocobalamin 500 MICROGram(s) Oral daily  donepezil 10 milliGRAM(s) Oral at bedtime  furosemide    Tablet 20 milliGRAM(s) Oral <User Schedule>    MEDICATIONS  (PRN):          VITALS:   T(C): 36.6 (21 @ 11:39), Max: 36.8 (21 @ 21:30)  HR: 60 (21 @ 11:39) (58 - 92)  BP: 118/73 (21 @ 11:39) (109/62 - 158/82)  RR: 18 (21 @ 11:39) (18 - 18)  SpO2: 96% (21 @ 11:39) (94% - 96%)  Wt(kg): --     PHYSICAL EXAM:  GENERAL: NAD, well nourished and conversant  HEAD:  ecchymosis and swelling of the right eye  EYES: EOM, PERRLA, conjunctiva pink and sclera white  ENT: No tonsillar erythema, exudates, or enlargement, moist mucous membranes, good dentition, no lesions  NECK: Supple, No JVD, normal thyroid, carotids with normal upstrokes and no bruits  CHEST/LUNG: Clear to auscultation bilaterally, No rales, rhonchi, wheezing, or rubs  HEART: Regular rate and rhythm, No murmurs, rubs, or gallops  ABDOMEN: Soft, nondistended, no masses, guarding, tenderness or rebound, bowel sounds present  EXTREMITIES:  2+ Peripheral Pulses, No clubbing, cyanosis, or edema.   LYMPH: No lymphadenopathy noted  SKIN: No rashes or lesions  NERVOUS SYSTEM:  Alert & Oriented X1, normal cognitive function. Motor Strength 5/5 right upper and right lower.  5/5 left upper and left lower extremities, DTRs 2+ intact and symmetric    LABS:    CARDIAC MARKERS ( 2021 04:18 )  x     / x     / 77 U/L / x     / x          CBC Full  -  ( 2021 06:55 )  WBC Count : 5.68 K/uL  RBC Count : 3.36 M/uL  Hemoglobin : 10.7 g/dL  Hematocrit : 33.1 %  Platelet Count - Automated : 206 K/uL  Mean Cell Volume : 98.5 fl  Mean Cell Hemoglobin : 31.8 pg  Mean Cell Hemoglobin Concentration : 32.3 gm/dL  Auto Neutrophil # : x  Auto Lymphocyte # : x  Auto Monocyte # : x  Auto Eosinophil # : x  Auto Basophil # : x  Auto Neutrophil % : x  Auto Lymphocyte % : x  Auto Monocyte % : x  Auto Eosinophil % : x  Auto Basophil % : x    -    139  |  104  |  16  ----------------------------<  82  3.8   |  27  |  0.77    Ca    9.4      2021 06:56    TPro  7.1  /  Alb  3.8  /  TBili  0.5  /  DBili  x   /  AST  26  /  ALT  25  /  AlkPhos  194<H>  01-12    LIVER FUNCTIONS - ( 2021 04:18 )  Alb: 3.8 g/dL / Pro: 7.1 g/dL / ALK PHOS: 194 U/L / ALT: 25 U/L / AST: 26 U/L / GGT: x             Urinalysis Basic - ( 2021 08:27 )    Color: Yellow / Appearance: Slightly Turbid / S.019 / pH: x  Gluc: x / Ketone: Negative  / Bili: Negative / Urobili: Negative   Blood: x / Protein: Trace / Nitrite: Positive   Leuk Esterase: Moderate / RBC: 3 /hpf / WBC 10 /HPF   Sq Epi: x / Non Sq Epi: 3 /hpf / Bacteria: Many      CAPILLARY BLOOD GLUCOSE          RADIOLOGY & ADDITIONAL TESTS:      
89 yo F with pmh of dementia, HLD, HTN presenting sp fall with laceration on right cheek. Patient A and Ox2 and is not a reliable historian. Per patient, she hit her face against a wardrobe in her house. Denies falling or LOC. Per son, patient was found down soon after she fell. Thinks she fell and hit her head on wardrobe. Was stating she was cold earlier today which is not unusual for her. Also states that she had increased frequency of urination. Denies any other complaints yesterday. Not on AC. Patient had just come home from rehab after pelvic fracture. Per son, concerned about her safety at home. Pt was found to have a positive UA. as per the Pts son, Patient has had increased urination. Patient seen resting comfortably.     MEDICATIONS  (STANDING):  amLODIPine   Tablet 2.5 milliGRAM(s) Oral daily  aspirin  chewable 81 milliGRAM(s) Oral daily  atorvastatin 20 milliGRAM(s) Oral at bedtime  cholecalciferol 1000 Unit(s) Oral two times a day  ciprofloxacin     Tablet 250 milliGRAM(s) Oral two times a day  cyanocobalamin 500 MICROGram(s) Oral daily  donepezil 10 milliGRAM(s) Oral at bedtime  furosemide    Tablet 20 milliGRAM(s) Oral <User Schedule>    MEDICATIONS  (PRN):          VITALS:   T(C): 36.6 (01-14-21 @ 21:10), Max: 36.9 (01-14-21 @ 16:35)  HR: 69 (01-14-21 @ 21:10) (58 - 69)  BP: 142/64 (01-14-21 @ 21:10) (112/65 - 149/76)  RR: 18 (01-14-21 @ 21:10) (18 - 18)  SpO2: 93% (01-14-21 @ 21:10) (93% - 99%)  Wt(kg): --     PHYSICAL EXAM:  GENERAL: NAD, well nourished and conversant  HEAD:  ecchymosis and swelling of the right eye  EYES: EOM, PERRLA, conjunctiva pink and sclera white  ENT: No tonsillar erythema, exudates, or enlargement, moist mucous membranes, good dentition, no lesions  NECK: Supple, No JVD, normal thyroid, carotids with normal upstrokes and no bruits  CHEST/LUNG: Clear to auscultation bilaterally, No rales, rhonchi, wheezing, or rubs  HEART: Regular rate and rhythm, No murmurs, rubs, or gallops  ABDOMEN: Soft, nondistended, no masses, guarding, tenderness or rebound, bowel sounds present  EXTREMITIES:  2+ Peripheral Pulses, No clubbing, cyanosis, or edema.   LYMPH: No lymphadenopathy noted  SKIN: No rashes or lesions  NERVOUS SYSTEM:  Alert & Oriented X1, normal cognitive function. Motor Strength 5/5 right upper and right lower.  5/5 left upper and left lower extremities, DTRs 2+ intact and symmetric    LABS:        CBC Full  -  ( 13 Jan 2021 06:55 )  WBC Count : 5.68 K/uL  RBC Count : 3.36 M/uL  Hemoglobin : 10.7 g/dL  Hematocrit : 33.1 %  Platelet Count - Automated : 206 K/uL  Mean Cell Volume : 98.5 fl  Mean Cell Hemoglobin : 31.8 pg  Mean Cell Hemoglobin Concentration : 32.3 gm/dL  Auto Neutrophil # : x  Auto Lymphocyte # : x  Auto Monocyte # : x  Auto Eosinophil # : x  Auto Basophil # : x  Auto Neutrophil % : x  Auto Lymphocyte % : x  Auto Monocyte % : x  Auto Eosinophil % : x  Auto Basophil % : x    01-13    139  |  104  |  16  ----------------------------<  82  3.8   |  27  |  0.77    Ca    9.4      13 Jan 2021 06:56            CAPILLARY BLOOD GLUCOSE          RADIOLOGY & ADDITIONAL TESTS:

## 2021-01-15 NOTE — DISCHARGE NOTE PROVIDER - CARE PROVIDERS DIRECT ADDRESSES
[email or web address]

## 2021-03-15 ENCOUNTER — APPOINTMENT (OUTPATIENT)
Dept: INTERNAL MEDICINE | Facility: CLINIC | Age: 86
End: 2021-03-15
Payer: MEDICARE

## 2021-03-15 VITALS
DIASTOLIC BLOOD PRESSURE: 82 MMHG | SYSTOLIC BLOOD PRESSURE: 132 MMHG | OXYGEN SATURATION: 97 % | HEART RATE: 66 BPM | WEIGHT: 118 LBS | BODY MASS INDEX: 20.91 KG/M2 | TEMPERATURE: 97.5 F | HEIGHT: 63 IN | RESPIRATION RATE: 12 BRPM

## 2021-03-15 DIAGNOSIS — R35.0 FREQUENCY OF MICTURITION: ICD-10-CM

## 2021-03-15 DIAGNOSIS — F03.90 UNSPECIFIED DEMENTIA W/OUT BEHAVIORAL DISTURBANCE: ICD-10-CM

## 2021-03-15 PROCEDURE — 99214 OFFICE O/P EST MOD 30 MIN: CPT

## 2021-03-15 PROCEDURE — 99072 ADDL SUPL MATRL&STAF TM PHE: CPT

## 2021-03-16 NOTE — HISTORY OF PRESENT ILLNESS
[de-identified] : Ms. ALEXANDRA ZUNIGA is a 88 year old female,  with PMHx of HTN, HLD, Lung Ca, s/p Left upper lobe resection, dementia, presents for post hospitalization/post rehab follow up visit\par As per son pt has had multiple falls at home in the past year with multiple ER visits/hospitalizations and rehab\par Her most recent hospitalization was January 11th for facial bone fx and UTI, followed by Rehab. She was d/c'd from rehab Feb 27th \par She was d/c'd on antibiotics for another UTI  ( cephaloxin 500mg BID)\par Son states that pt has been urinating a lot at home and is worried she might have another UTI\par \par \par \par \par \par \par

## 2021-03-16 NOTE — PHYSICAL EXAM
[Normal] : no acute distress, well nourished, well developed and well-appearing [Normal Sclera/Conjunctiva] : normal sclera/conjunctiva [Normal Outer Ear/Nose] : the outer ears and nose were normal in appearance [No JVD] : no jugular venous distention [No Respiratory Distress] : no respiratory distress  [No Accessory Muscle Use] : no accessory muscle use [Clear to Auscultation] : lungs were clear to auscultation bilaterally [Normal Rate] : normal rate  [Regular Rhythm] : with a regular rhythm [Normal S1, S2] : normal S1 and S2 [No Edema] : there was no peripheral edema [Soft] : abdomen soft [Non Tender] : non-tender [Non-distended] : non-distended [Normal Bowel Sounds] : normal bowel sounds [Normal Posterior Cervical Nodes] : no posterior cervical lymphadenopathy [Normal Anterior Cervical Nodes] : no anterior cervical lymphadenopathy [No CVA Tenderness] : no CVA  tenderness [No Rash] : no rash [de-identified] : + small varicosities  [de-identified] : walking with water [de-identified] : Awake, alert, confused to time

## 2021-03-16 NOTE — ASSESSMENT
[FreeTextEntry1] : \par s/p multiple falls at home in the past year with multiple ER visits/hospitalizations/rehab\par reviewed most recent rehab discharge papers and meds \par \par urinary frequency. Pt is s/p recent UTI -treated with cephaloxin 500mg BID \par UA and urine culture ordered\par we'll hold off on antibiotics at this time-we'll await UA / culture results\par \par Dementia, Hx of HTN, HLD, Lung Ca, s/p Left upper lobe resection\par cont current meds\par

## 2021-03-29 ENCOUNTER — INPATIENT (INPATIENT)
Facility: HOSPITAL | Age: 86
LOS: 3 days | Discharge: NURSING, REHAB OR LONG-TERM CARE | DRG: 536 | End: 2021-04-02
Attending: INTERNAL MEDICINE | Admitting: INTERNAL MEDICINE
Payer: MEDICARE

## 2021-03-29 VITALS — HEIGHT: 64 IN

## 2021-03-29 DIAGNOSIS — Z98.890 OTHER SPECIFIED POSTPROCEDURAL STATES: Chronic | ICD-10-CM

## 2021-03-29 DIAGNOSIS — Z98.49 CATARACT EXTRACTION STATUS, UNSPECIFIED EYE: Chronic | ICD-10-CM

## 2021-03-29 DIAGNOSIS — Z90.2 ACQUIRED ABSENCE OF LUNG [PART OF]: Chronic | ICD-10-CM

## 2021-03-29 PROCEDURE — 99285 EMERGENCY DEPT VISIT HI MDM: CPT

## 2021-03-29 NOTE — ED PROVIDER NOTE - PROGRESS NOTE DETAILS
d/w son on phone, son states that family is unable to care for pt as pt has been very active, high fall risk, and is strongly considering nursing facility due to barriers at home. xray shows fracture of hip, ortho consulted, pt not surgical candidiate, admit to hospital for PT eval, dispo planning a spt likely cannot return to home, Ct head negative, case d/w Dr. Teague, accepting hospitalist xray shows fracture of pelvis, ortho consulted, pt not surgical candidiate, admit to hospital for PT eval, dispo planning a spt likely cannot return to home, Ct head negative, case d/w Dr. Teague, accepting hospitalist

## 2021-03-29 NOTE — ED PROVIDER NOTE - PHYSICAL EXAMINATION
Exam:   General: Non toxic, well appearing  HENT: No pharyngeal erythema/exudate, external ear exam normal  Eyes: PEERL, EOMI  Lungs: CTA B/L, no wheezing, no rales, no ronchi  Chest: Normal Heart sounds, no murmurs, no rubs no gallops  Abdomen: Soft, no tenderness, no rigidity, no guarding, neg Rovsing's signs, neg tenderness at Mckburney's point  MSK: Pain in hip on palpation  Skin: No new rashes or lesions  Neuro: Alert and oriented x 2 Exam:   General: Non toxic, well appearing  HENT: No pharyngeal erythema/exudate, external ear exam normal  Eyes: PEERL, EOMI  Lungs: CTA B/L, no wheezing, no rales, no ronchi  Chest: Normal Heart sounds, no murmurs, no rubs no gallops  Abdomen: Soft, no tenderness, no rigidity, no guarding, neg Rovsing's signs, neg tenderness at Mckburney's point  MSK: Pain in palp of pelvis, stable   Skin: No new rashes or lesions  Neuro: Alert and oriented x 2

## 2021-03-29 NOTE — ED PROVIDER NOTE - CARE PLAN
Principal Discharge DX:	Fall at home   Principal Discharge DX:	Hip fracture   Principal Discharge DX:	Pelvic fracture

## 2021-03-29 NOTE — ED PROVIDER NOTE - OBJECTIVE STATEMENT
89 yo F with pmh of dementia, HLD, HTN presenting to the ed after a fall. At baseline aox2. She has had several falls recently and was recently in rehab, was at home when son heard a fall. She is not on thinners.  Mechanism of fall is unknown. denies any pain anywhere, chest pain, sob, abd pain.

## 2021-03-29 NOTE — ED PROVIDER NOTE - ATTENDING CONTRIBUTION TO CARE
MD Acosta:  patient seen and evaluated personally.   I agree with the History & Physical,  Impression & Plan other than what was detailed in my note.  MD Acosta    89 yo F with pmh of dementia, HLD, HTN presenting sp unwitnessed fall with facial injury. At baseline- A&O x 2, presents to ED s/p fall earlier tonight. She has had several falls recently and was recently in rehab, was at home when son heard a fall. She is not on thinners and is at baseline. Mechanism of fall is unknown. Ems reports pt had bad hip pain and received 5 of morhpine x 2. Initially reported to be htn to 200's but otherwise vitals stable although on 02 because pt breathing decreased slightly. pt is axox 2 only because she thought it was 2020, moving all extrem, no s/o trauma, pinpoint pupils, normal rr at bs, very slightly sedate when not stimulated. no midline c/t/l/s spine ttp, no chest abd pelvis ttp, pt does have slight pain w/ external rotation of left leg, and equivocal ttp over knees. Given unwitnessed fall will get labs, cbc, cmp, trop, given age, dementia, will get ct head/c spine, also cxr pelvis x ray, b/l knee x ray.

## 2021-03-29 NOTE — ED PROVIDER NOTE - CLINICAL SUMMARY MEDICAL DECISION MAKING FREE TEXT BOX
87 yo F with pmh of dementia, HLD, HTN presenting sp unwitnessed fall with facial injury will obtain Ct head, chest abd pelvis to r/o fracture, xray extremities, cbc, cmp, 2/2 to unwitnessed fall.

## 2021-03-30 DIAGNOSIS — I10 ESSENTIAL (PRIMARY) HYPERTENSION: ICD-10-CM
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
## 2021-03-30 DIAGNOSIS — F03.90 UNSPECIFIED DEMENTIA WITHOUT BEHAVIORAL DISTURBANCE: ICD-10-CM

## 2021-03-30 DIAGNOSIS — E78.5 HYPERLIPIDEMIA, UNSPECIFIED: ICD-10-CM

## 2021-03-30 LAB
ALBUMIN SERPL ELPH-MCNC: 3.9 G/DL — SIGNIFICANT CHANGE UP (ref 3.3–5)
ALP SERPL-CCNC: 156 U/L — HIGH (ref 40–120)
ALT FLD-CCNC: 12 U/L — SIGNIFICANT CHANGE UP (ref 10–45)
ANION GAP SERPL CALC-SCNC: 12 MMOL/L — SIGNIFICANT CHANGE UP (ref 5–17)
APPEARANCE UR: CLEAR — SIGNIFICANT CHANGE UP
AST SERPL-CCNC: 28 U/L — SIGNIFICANT CHANGE UP (ref 10–40)
BASOPHILS # BLD AUTO: 0.04 K/UL — SIGNIFICANT CHANGE UP (ref 0–0.2)
BASOPHILS NFR BLD AUTO: 0.5 % — SIGNIFICANT CHANGE UP (ref 0–2)
BILIRUB SERPL-MCNC: 0.5 MG/DL — SIGNIFICANT CHANGE UP (ref 0.2–1.2)
BILIRUB UR-MCNC: NEGATIVE — SIGNIFICANT CHANGE UP
BUN SERPL-MCNC: 16 MG/DL — SIGNIFICANT CHANGE UP (ref 7–23)
CALCIUM SERPL-MCNC: 9.8 MG/DL — SIGNIFICANT CHANGE UP (ref 8.4–10.5)
CHLORIDE SERPL-SCNC: 103 MMOL/L — SIGNIFICANT CHANGE UP (ref 96–108)
CO2 SERPL-SCNC: 22 MMOL/L — SIGNIFICANT CHANGE UP (ref 22–31)
COLOR SPEC: SIGNIFICANT CHANGE UP
CREAT SERPL-MCNC: 0.51 MG/DL — SIGNIFICANT CHANGE UP (ref 0.5–1.3)
DIFF PNL FLD: NEGATIVE — SIGNIFICANT CHANGE UP
EOSINOPHIL # BLD AUTO: 0.09 K/UL — SIGNIFICANT CHANGE UP (ref 0–0.5)
EOSINOPHIL NFR BLD AUTO: 1.2 % — SIGNIFICANT CHANGE UP (ref 0–6)
GLUCOSE SERPL-MCNC: 109 MG/DL — HIGH (ref 70–99)
GLUCOSE UR QL: NEGATIVE — SIGNIFICANT CHANGE UP
HCT VFR BLD CALC: 35.7 % — SIGNIFICANT CHANGE UP (ref 34.5–45)
HGB BLD-MCNC: 11.7 G/DL — SIGNIFICANT CHANGE UP (ref 11.5–15.5)
IMM GRANULOCYTES NFR BLD AUTO: 0.5 % — SIGNIFICANT CHANGE UP (ref 0–1.5)
KETONES UR-MCNC: NEGATIVE — SIGNIFICANT CHANGE UP
LEUKOCYTE ESTERASE UR-ACNC: NEGATIVE — SIGNIFICANT CHANGE UP
LYMPHOCYTES # BLD AUTO: 1.55 K/UL — SIGNIFICANT CHANGE UP (ref 1–3.3)
LYMPHOCYTES # BLD AUTO: 20.1 % — SIGNIFICANT CHANGE UP (ref 13–44)
MCHC RBC-ENTMCNC: 32 PG — SIGNIFICANT CHANGE UP (ref 27–34)
MCHC RBC-ENTMCNC: 32.8 GM/DL — SIGNIFICANT CHANGE UP (ref 32–36)
MCV RBC AUTO: 97.5 FL — SIGNIFICANT CHANGE UP (ref 80–100)
MONOCYTES # BLD AUTO: 0.59 K/UL — SIGNIFICANT CHANGE UP (ref 0–0.9)
MONOCYTES NFR BLD AUTO: 7.7 % — SIGNIFICANT CHANGE UP (ref 2–14)
NEUTROPHILS # BLD AUTO: 5.39 K/UL — SIGNIFICANT CHANGE UP (ref 1.8–7.4)
NEUTROPHILS NFR BLD AUTO: 70 % — SIGNIFICANT CHANGE UP (ref 43–77)
NITRITE UR-MCNC: NEGATIVE — SIGNIFICANT CHANGE UP
NRBC # BLD: 0 /100 WBCS — SIGNIFICANT CHANGE UP (ref 0–0)
PH UR: 6.5 — SIGNIFICANT CHANGE UP (ref 5–8)
PLATELET # BLD AUTO: 229 K/UL — SIGNIFICANT CHANGE UP (ref 150–400)
POTASSIUM SERPL-MCNC: 4.7 MMOL/L — SIGNIFICANT CHANGE UP (ref 3.5–5.3)
POTASSIUM SERPL-SCNC: 4.7 MMOL/L — SIGNIFICANT CHANGE UP (ref 3.5–5.3)
PROT SERPL-MCNC: 7.1 G/DL — SIGNIFICANT CHANGE UP (ref 6–8.3)
PROT UR-MCNC: NEGATIVE — SIGNIFICANT CHANGE UP
RBC # BLD: 3.66 M/UL — LOW (ref 3.8–5.2)
RBC # FLD: 13.4 % — SIGNIFICANT CHANGE UP (ref 10.3–14.5)
SARS-COV-2 RNA SPEC QL NAA+PROBE: SIGNIFICANT CHANGE UP
SODIUM SERPL-SCNC: 137 MMOL/L — SIGNIFICANT CHANGE UP (ref 135–145)
SP GR SPEC: 1.01 — SIGNIFICANT CHANGE UP (ref 1.01–1.02)
TROPONIN T, HIGH SENSITIVITY RESULT: <6 NG/L — SIGNIFICANT CHANGE UP (ref 0–51)
TSH SERPL-MCNC: 6.17 UIU/ML — HIGH (ref 0.27–4.2)
UROBILINOGEN FLD QL: NEGATIVE — SIGNIFICANT CHANGE UP
WBC # BLD: 7.7 K/UL — SIGNIFICANT CHANGE UP (ref 3.8–10.5)
WBC # FLD AUTO: 7.7 K/UL — SIGNIFICANT CHANGE UP (ref 3.8–10.5)

## 2021-03-30 PROCEDURE — 73562 X-RAY EXAM OF KNEE 3: CPT | Mod: 26,50

## 2021-03-30 PROCEDURE — 71045 X-RAY EXAM CHEST 1 VIEW: CPT | Mod: 26

## 2021-03-30 PROCEDURE — 73502 X-RAY EXAM HIP UNI 2-3 VIEWS: CPT | Mod: 26,LT

## 2021-03-30 PROCEDURE — 73030 X-RAY EXAM OF SHOULDER: CPT | Mod: 26,LT

## 2021-03-30 PROCEDURE — 70450 CT HEAD/BRAIN W/O DYE: CPT | Mod: 26

## 2021-03-30 PROCEDURE — 72125 CT NECK SPINE W/O DYE: CPT | Mod: 26

## 2021-03-30 PROCEDURE — 72170 X-RAY EXAM OF PELVIS: CPT | Mod: 26,59

## 2021-03-30 RX ORDER — PREGABALIN 225 MG/1
1 CAPSULE ORAL
Qty: 0 | Refills: 0 | DISCHARGE

## 2021-03-30 RX ORDER — FUROSEMIDE 40 MG
20 TABLET ORAL
Refills: 0 | Status: DISCONTINUED | OUTPATIENT
Start: 2021-03-30 | End: 2021-04-02

## 2021-03-30 RX ORDER — ATORVASTATIN CALCIUM 80 MG/1
20 TABLET, FILM COATED ORAL AT BEDTIME
Refills: 0 | Status: DISCONTINUED | OUTPATIENT
Start: 2021-03-30 | End: 2021-04-02

## 2021-03-30 RX ORDER — ASPIRIN/CALCIUM CARB/MAGNESIUM 324 MG
81 TABLET ORAL DAILY
Refills: 0 | Status: DISCONTINUED | OUTPATIENT
Start: 2021-03-30 | End: 2021-04-02

## 2021-03-30 RX ORDER — FUROSEMIDE 40 MG
1 TABLET ORAL
Qty: 0 | Refills: 0 | DISCHARGE

## 2021-03-30 RX ORDER — DONEPEZIL HYDROCHLORIDE 10 MG/1
10 TABLET, FILM COATED ORAL AT BEDTIME
Refills: 0 | Status: DISCONTINUED | OUTPATIENT
Start: 2021-03-30 | End: 2021-04-02

## 2021-03-30 RX ORDER — AMLODIPINE BESYLATE 2.5 MG/1
1 TABLET ORAL
Qty: 0 | Refills: 0 | DISCHARGE

## 2021-03-30 RX ORDER — ASPIRIN/CALCIUM CARB/MAGNESIUM 324 MG
1 TABLET ORAL
Qty: 0 | Refills: 0 | DISCHARGE

## 2021-03-30 RX ORDER — ROSUVASTATIN CALCIUM 5 MG/1
1 TABLET ORAL
Qty: 0 | Refills: 0 | DISCHARGE

## 2021-03-30 RX ORDER — CHOLECALCIFEROL (VITAMIN D3) 125 MCG
1000 CAPSULE ORAL
Refills: 0 | Status: DISCONTINUED | OUTPATIENT
Start: 2021-03-30 | End: 2021-04-02

## 2021-03-30 RX ORDER — AMLODIPINE BESYLATE 2.5 MG/1
2.5 TABLET ORAL DAILY
Refills: 0 | Status: DISCONTINUED | OUTPATIENT
Start: 2021-03-30 | End: 2021-04-02

## 2021-03-30 RX ORDER — PREGABALIN 225 MG/1
500 CAPSULE ORAL DAILY
Refills: 0 | Status: DISCONTINUED | OUTPATIENT
Start: 2021-03-30 | End: 2021-04-02

## 2021-03-30 RX ORDER — SENNA PLUS 8.6 MG/1
2 TABLET ORAL AT BEDTIME
Refills: 0 | Status: DISCONTINUED | OUTPATIENT
Start: 2021-03-30 | End: 2021-04-02

## 2021-03-30 RX ORDER — ACETAMINOPHEN 500 MG
650 TABLET ORAL EVERY 6 HOURS
Refills: 0 | Status: DISCONTINUED | OUTPATIENT
Start: 2021-03-30 | End: 2021-04-02

## 2021-03-30 RX ORDER — MEMANTINE HYDROCHLORIDE 10 MG/1
5 TABLET ORAL
Refills: 0 | Status: DISCONTINUED | OUTPATIENT
Start: 2021-03-30 | End: 2021-04-02

## 2021-03-30 RX ORDER — CHOLECALCIFEROL (VITAMIN D3) 125 MCG
1 CAPSULE ORAL
Qty: 0 | Refills: 0 | DISCHARGE

## 2021-03-30 RX ADMIN — SENNA PLUS 2 TABLET(S): 8.6 TABLET ORAL at 22:21

## 2021-03-30 RX ADMIN — DONEPEZIL HYDROCHLORIDE 10 MILLIGRAM(S): 10 TABLET, FILM COATED ORAL at 22:22

## 2021-03-30 RX ADMIN — Medication 1000 UNIT(S): at 18:43

## 2021-03-30 RX ADMIN — Medication 650 MILLIGRAM(S): at 18:43

## 2021-03-30 RX ADMIN — ATORVASTATIN CALCIUM 20 MILLIGRAM(S): 80 TABLET, FILM COATED ORAL at 22:22

## 2021-03-30 RX ADMIN — MEMANTINE HYDROCHLORIDE 5 MILLIGRAM(S): 10 TABLET ORAL at 18:43

## 2021-03-30 RX ADMIN — Medication 81 MILLIGRAM(S): at 16:01

## 2021-03-30 RX ADMIN — AMLODIPINE BESYLATE 2.5 MILLIGRAM(S): 2.5 TABLET ORAL at 16:01

## 2021-03-30 RX ADMIN — PREGABALIN 500 MICROGRAM(S): 225 CAPSULE ORAL at 16:00

## 2021-03-30 NOTE — PATIENT PROFILE ADULT - NSPROGENSOURCEINFO_GEN_A_NUR
Breathing spontaneous and unlabored. Breath sounds clear and equal bilaterally with regular rhythm. patient

## 2021-03-30 NOTE — H&P ADULT - ASSESSMENT
89 yo woman presents after a fall at home. Patient has a hx of dementia 89 yo woman presents after a fall at home. Patient has a hx of dementia. found to have an new left acute pubic rami fx.

## 2021-03-30 NOTE — ED ADULT NURSE REASSESSMENT NOTE - NS ED NURSE REASSESS COMMENT FT1
Straight urinary catheter inserted using sterile technique. Procedure, risks, and benefits of catheter explained to patient, patient verbalized understanding. Second RN present to confirm sterility. Pt tolerated well. Urinary catheter drained   650 mL  clear yellow urine, no clots visualized. Urinalysis and urine cultures obtained. Catheter removed promptly.

## 2021-03-30 NOTE — ED POST DISCHARGE NOTE - OTHER COMMUNICATION
3/30/21: Xray discrepancy of chronic, not acute R pubic rami fx as above. Chart reviewed, pt currently admitted to Dr. Teague's service, orthopedics as already consulted and per their note they are aware the R pubic rami fx is chronic and not acute. Primary teams aware of result, no acute ortho intervention based on ortho note. no further ED management indicated at this time. - Teofilo Campos PA-C 3/30/21: Xray discrepancy of chronic, not acute R pubic rami fx as above. Chart reviewed, pt currently admitted to Dr. Teague's service, orthopedics was already consulted and per their note they are aware the R pubic rami fx is chronic and not acute. Primary teams aware of result, no acute ortho intervention based on ortho note. no further ED management indicated at this time. - Teofilo Campos PA-C

## 2021-03-30 NOTE — ED ADULT NURSE NOTE - OBJECTIVE STATEMENT
Pt is an 87 y/o female pmhx of HTN, HLD presents to the ED BIBA s/p fall. Pt lives in same home as son, her son heard her fall and heard her scream and found her on the ground. As per EMS, no AC use, unknown if pt hit her head. Pt received 10mg of Morphine by EMS because she was complaining of hip pain. She is unable to provide any information regarding her fall but complains of pain at L hip. Pt presents oriented x 1 to person, calm, able to follow commands, speech clear. Breathing spontaneous & nonlabored, lungs CTA b/l. Abdomen soft & nondistended. No leg shortening or external rotation of leg noted. Strong peripheral pulses noted b/l, no edema noted. Skin warm, clean, dry & intact, no tears or lacerations. Denies chest pain, SOB, abdominal pain, back pain, n/v/d, fever, chills, changes in vision. Red fall socks placed on pt. Call bell within reach, bed in lowest position, side rails up, wheels locked.

## 2021-03-30 NOTE — H&P ADULT - NSHPLABSRESULTS_GEN_ALL_CORE
11.7   7.70  )-----------( 229      ( 30 Mar 2021 01:01 )             35.7       03    137  |  103  |  16  ----------------------------<  109<H>  4.7   |  22  |  0.51    Ca    9.8      30 Mar 2021 01:01  Mg     2.2         TPro  7.1  /  Alb  3.9  /  TBili  0.5  /  DBili  x   /  AST  28  /  ALT  12  /  AlkPhos  156<H>  03-30              Urinalysis Basic - ( 30 Mar 2021 02:01 )    Color: Light Yellow / Appearance: Clear / S.011 / pH: x  Gluc: x / Ketone: Negative  / Bili: Negative / Urobili: Negative   Blood: x / Protein: Negative / Nitrite: Negative   Leuk Esterase: Negative / RBC: x / WBC x   Sq Epi: x / Non Sq Epi: x / Bacteria: x            Lactate Trend            CAPILLARY BLOOD GLUCOSE

## 2021-03-30 NOTE — H&P ADULT - PROBLEM SELECTOR PLAN 1
Patient with a new pubic rami fx  will continue pain meds and physical therapy as tolerated  appreciate ortho input  will work on DC planning to long term facility

## 2021-03-30 NOTE — PHYSICAL THERAPY INITIAL EVALUATION ADULT - ADDITIONAL COMMENTS
Patient lives in pvt house with spouse and son.  Patient ambulated without AD independent. outdoor amb using standard cane or shopping cart.

## 2021-03-30 NOTE — ED POST DISCHARGE NOTE - RESULT SUMMARY
Bony productive changes present about the right superior pubic ramus fracture, consistent with a chronic fracture. Fracture seen on pelvic radiograph 1/12/2021. Left superior pubic ramus fracture is present. There is question of some bony callus formation on the left, which may represent an acute on chronic versus subacute fracture, new from radiograph from 1/12/2021. No significant displacement. This represents a difference in interpretation from the initial preliminary report, which stated right pubic ramus fracture is acute

## 2021-03-30 NOTE — CONSULT NOTE ADULT - SUBJECTIVE AND OBJECTIVE BOX
88y Female, frequent falls, presents with groin pain s/p multiple falls. Unable to obtain thorough history due to underlying dementia. Had previous L IMN with Dr. Forrest in April 2020, noted to have R pubic rami fractures in November 2020. Also complaining of L shoulder pain though may not comprehend questioning.     HEALTH ISSUES - PROBLEM Dx:        MEDICATIONS  (STANDING):    Allergies    No Known Allergies    Intolerances                              11.7   7.70  )-----------( 229      ( 30 Mar 2021 01:01 )             35.7     30 Mar 2021 01:01    137    |  103    |  16     ----------------------------<  109    4.7     |  22     |  0.51     Ca    9.8        30 Mar 2021 01:01  Mg     2.2       30 Mar 2021 01:01    TPro  7.1    /  Alb  3.9    /  TBili  0.5    /  DBili  x      /  AST  28     /  ALT  12     /  AlkPhos  156    30 Mar 2021 01:01          Vital Signs Last 24 Hrs  T(C): 36.4 (03-30-21 @ 01:14), Max: 36.4 (03-30-21 @ 01:14)  T(F): 97.5 (03-30-21 @ 01:14), Max: 97.5 (03-30-21 @ 01:14)  HR: 60 (03-30-21 @ 01:14) (53 - 60)  BP: 170/68 (03-30-21 @ 01:14) (162/66 - 170/68)  BP(mean): 92 (03-29-21 @ 23:50) (92 - 92)  RR: 16 (03-30-21 @ 01:14) (12 - 16)  SpO2: 100% (03-30-21 @ 01:14) (100% - 100%)  Gen: NAD  BLLE:   Skin intact,  +TTP groin  Pelvis grossly stable to stress   PROM with mild pain  Negative log roll/heel strike  +TA/EHL/FS  L2-S1 SILT  DP/PT 2+  Compartments soft and compressible    RUE: No bony tenderness or deformity, skin intact  LUE: No bony tenderness or deformity, skin intact. Complaining of L shoulder pain      A/P: 88y Female w R chronic pubic rami fractures, L acute pubic rami fractures  F/u XRs L shoulder  Pain control  DVT ppx  WBAT BLLE  FU labs/imaging  No acute ortho surgical intervention  Follow up with Dr. Forrest as outpatient in 7-10 days, call office for appointment  Ortho stable

## 2021-03-30 NOTE — ED ADULT NURSE NOTE - INTERVENTIONS DEFINITIONS
[place]oy to call system/Call bell, personal items and telephone within reach/Non-slip footwear when patient is off stretcher

## 2021-03-30 NOTE — H&P ADULT - HISTORY OF PRESENT ILLNESS
89 yo F with pmh of dementia, HLD, HTN presenting sp unwitnessed fall with facial injury. At baseline- A&O x 2, presents to ED s/p fall earlier tonight. She has had several falls recently and was recently in rehab, was at home when son heard a fall. She is not on thinners and is at baseline. Mechanism of fall is unknown. Ems reports pt had bad hip pain and received 5 of morhpine x 2. Initially reported to be htn to 200's but otherwise vitals stable although on 02 because pt breathing decreased slightly. pt is axox 2 only because she thought it was 2020, moving all extremity no s/o trauma, Pts son discussed case with ED physician and suggested he can no longer take care of the Patient at home. Would like to look at long term care

## 2021-03-30 NOTE — PHYSICAL THERAPY INITIAL EVALUATION ADULT - PERTINENT HX OF CURRENT PROBLEM, REHAB EVAL
87 yo F with pmh of dementia, HLD, HTN presenting sp unwitnessed fall with facial injury. At baseline- A&O x 2, presents to ED s/p fall earlier tonight. She has had several falls recently and was recently in rehab, was at home when son heard a fall. Xray pelvis- R chronic pubic rami fractures, L acute pubic rami fractures. CTH- No acute pathology, CT C spine- no acute fx/dislocation, X ray L shoulder, x ray L hip/ knee neg for acute fx/dislocation.

## 2021-03-30 NOTE — ED ADULT NURSE REASSESSMENT NOTE - NS ED NURSE REASSESS COMMENT FT1
Pt turned & repositioned for safety & comfort. Pt states her L hip only hurts when she is moving, no complaints of pain when she isn't moving around. Awaiting admission bed.

## 2021-03-30 NOTE — H&P ADULT - NSHPPHYSICALEXAM_GEN_ALL_CORE
PHYSICAL EXAM:  GENERAL: NAD, well nourished and conversant  HEAD:  Atraumatic  EYES: EOM, PERRLA, conjunctiva pink and sclera white  ENT: No tonsillar erythema, exudates, or enlargement, moist mucous membranes, good dentition, no lesions  NECK: Supple, No JVD, normal thyroid, carotids with normal upstrokes and no bruits  CHEST/LUNG: Clear to auscultation bilaterally, No rales, rhonchi, wheezing, or rubs  HEART: Regular rate and rhythm, No murmurs, rubs, or gallops  ABDOMEN: Soft, nondistended, no masses, guarding, tenderness or rebound, bowel sounds present  EXTREMITIES:  2+ Peripheral Pulses, No clubbing, cyanosis, or edema.   LYMPH: No lymphadenopathy noted  SKIN: No rashes or lesions  NERVOUS SYSTEM:  confused. Motor Strength 5/5 right upper and right lower.  5/5 left upper and left lower extremities, DTRs 2+ intact and symmetric

## 2021-03-31 ENCOUNTER — TRANSCRIPTION ENCOUNTER (OUTPATIENT)
Age: 86
End: 2021-03-31

## 2021-03-31 LAB
ANION GAP SERPL CALC-SCNC: 10 MMOL/L — SIGNIFICANT CHANGE UP (ref 5–17)
BUN SERPL-MCNC: 17 MG/DL — SIGNIFICANT CHANGE UP (ref 7–23)
CALCIUM SERPL-MCNC: 9.4 MG/DL — SIGNIFICANT CHANGE UP (ref 8.4–10.5)
CHLORIDE SERPL-SCNC: 106 MMOL/L — SIGNIFICANT CHANGE UP (ref 96–108)
CO2 SERPL-SCNC: 23 MMOL/L — SIGNIFICANT CHANGE UP (ref 22–31)
COVID-19 SPIKE DOMAIN AB INTERP: POSITIVE
COVID-19 SPIKE DOMAIN ANTIBODY RESULT: >250 U/ML — HIGH
CREAT SERPL-MCNC: 0.58 MG/DL — SIGNIFICANT CHANGE UP (ref 0.5–1.3)
GLUCOSE SERPL-MCNC: 77 MG/DL — SIGNIFICANT CHANGE UP (ref 70–99)
POTASSIUM SERPL-MCNC: 3.8 MMOL/L — SIGNIFICANT CHANGE UP (ref 3.5–5.3)
POTASSIUM SERPL-SCNC: 3.8 MMOL/L — SIGNIFICANT CHANGE UP (ref 3.5–5.3)
SARS-COV-2 IGG+IGM SERPL QL IA: >250 U/ML — HIGH
SARS-COV-2 IGG+IGM SERPL QL IA: POSITIVE
SODIUM SERPL-SCNC: 139 MMOL/L — SIGNIFICANT CHANGE UP (ref 135–145)

## 2021-03-31 RX ORDER — ACETAMINOPHEN 500 MG
2 TABLET ORAL
Qty: 0 | Refills: 0 | DISCHARGE
Start: 2021-03-31

## 2021-03-31 RX ADMIN — AMLODIPINE BESYLATE 2.5 MILLIGRAM(S): 2.5 TABLET ORAL at 05:29

## 2021-03-31 RX ADMIN — Medication 650 MILLIGRAM(S): at 17:26

## 2021-03-31 RX ADMIN — Medication 20 MILLIGRAM(S): at 09:56

## 2021-03-31 RX ADMIN — DONEPEZIL HYDROCHLORIDE 10 MILLIGRAM(S): 10 TABLET, FILM COATED ORAL at 21:20

## 2021-03-31 RX ADMIN — MEMANTINE HYDROCHLORIDE 5 MILLIGRAM(S): 10 TABLET ORAL at 05:29

## 2021-03-31 RX ADMIN — Medication 81 MILLIGRAM(S): at 11:43

## 2021-03-31 RX ADMIN — Medication 1000 UNIT(S): at 05:29

## 2021-03-31 RX ADMIN — ATORVASTATIN CALCIUM 20 MILLIGRAM(S): 80 TABLET, FILM COATED ORAL at 21:20

## 2021-03-31 RX ADMIN — Medication 1000 UNIT(S): at 17:19

## 2021-03-31 RX ADMIN — PREGABALIN 500 MICROGRAM(S): 225 CAPSULE ORAL at 12:41

## 2021-03-31 RX ADMIN — SENNA PLUS 2 TABLET(S): 8.6 TABLET ORAL at 21:20

## 2021-03-31 RX ADMIN — MEMANTINE HYDROCHLORIDE 5 MILLIGRAM(S): 10 TABLET ORAL at 17:19

## 2021-03-31 RX ADMIN — Medication 650 MILLIGRAM(S): at 16:25

## 2021-03-31 NOTE — DISCHARGE NOTE PROVIDER - NSDCMRMEDTOKEN_GEN_ALL_CORE_FT
amLODIPine 2.5 mg oral tablet: 1 tab(s) orally once a day  aspirin 81 mg oral tablet: 1 tab(s) orally once a day in am    donepezil 10 mg oral tablet: 1 tab(s) orally once a day (at bedtime)  Lasix 20 mg oral tablet: 1 tab(s) orally every other day  memantine 5 mg oral tablet: 1 tab(s) orally 2 times a day until march 31st. then double doseage for 7 days   rosuvastatin 5 mg oral tablet: 1 tab(s) orally once a day (at bedtime)  senna oral tablet: 2 tab(s) orally once a day (at bedtime)  Hold for loose stool   Vitamin B12 500 mcg oral tablet: 1 tab(s) orally once a day  Vitamin D3 1000 intl units oral capsule: 1 cap(s) orally 2 times a day   acetaminophen 325 mg oral tablet: 2 tab(s) orally every 6 hours, As needed, Temp greater or equal to 38C (100.4F), Moderate Pain (4 - 6)  amLODIPine 2.5 mg oral tablet: 1 tab(s) orally once a day  aspirin 81 mg oral tablet: 1 tab(s) orally once a day in am    donepezil 10 mg oral tablet: 1 tab(s) orally once a day (at bedtime)  Lasix 20 mg oral tablet: 1 tab(s) orally every other day  memantine 5 mg oral tablet: 1 tab(s) orally 2 times a day until march 31st. then double doseage for 7 days   rosuvastatin 5 mg oral tablet: 1 tab(s) orally once a day (at bedtime)  senna oral tablet: 2 tab(s) orally once a day (at bedtime)  Hold for loose stool   Vitamin B12 500 mcg oral tablet: 1 tab(s) orally once a day  Vitamin D3 1000 intl units oral capsule: 1 cap(s) orally 2 times a day   acetaminophen 325 mg oral tablet: 2 tab(s) orally every 6 hours, As needed, Temp greater or equal to 38C (100.4F), Moderate Pain (4 - 6)  amLODIPine 2.5 mg oral tablet: 1 tab(s) orally once a day  aspirin 81 mg oral tablet: 1 tab(s) orally once a day in am    donepezil 10 mg oral tablet: 1 tab(s) orally once a day (at bedtime)  Lasix 20 mg oral tablet: 1 tab(s) orally every other day  memantine 5 mg oral tablet: 1 tab(s) orally 2 times a day  rosuvastatin 5 mg oral tablet: 1 tab(s) orally once a day (at bedtime)  senna oral tablet: 2 tab(s) orally once a day (at bedtime)  Hold for loose stool   Vitamin B12 500 mcg oral tablet: 1 tab(s) orally once a day  Vitamin D3 1000 intl units oral capsule: 1 cap(s) orally 2 times a day

## 2021-03-31 NOTE — DISCHARGE NOTE PROVIDER - CARE PROVIDER_API CALL
[facility]  [address] [place] [address]  Phone: [phone]  Fax: [phone]  Follow Up Time:

## 2021-03-31 NOTE — DISCHARGE NOTE PROVIDER - HOSPITAL COURSE
89 yo F with pmh of dementia, HLD, HTN presenting to the ed after a fall. At baseline aox2. She has had several falls recently and was recently in rehab, was at home when son heard a fall. She is not on thinners.  Mechanism of fall is unknown. denies any pain anywhere, chest pain, sob, abd pain.  DX: fall / chronic R pubic rami fx, L acute pubic rami fx  a new pubic rami fx seen by ortho no plan for sx continue pain meds and physical therapy as tolerated Patient scheduled for possible DC to rehab today or tomorrow   89 yo F with pmh of dementia, HLD, HTN presenting to the ed after a fall. At baseline aox2. She has had several falls recently and was recently in rehab, was at home when son heard a fall. She is not on thinners.  Mechanism of fall is unknown. denies any pain anywhere, chest pain, sob, abd pain.  DX: fall / chronic R pubic rami fx, L acute pubic rami fx  a new pubic rami fx seen by ortho no plan for sx continue pain meds and physical therapy as tolerated Patient scheduled for DC to rehab

## 2021-03-31 NOTE — DISCHARGE NOTE PROVIDER - NSDCCPCAREPLAN_GEN_ALL_CORE_FT
PRINCIPAL DISCHARGE DIAGNOSIS  Diagnosis: Fall at home  Assessment and Plan of Treatment: Follow up with rehab  Follow up with your primary care provider       PRINCIPAL DISCHARGE DIAGNOSIS  Diagnosis: Pelvic fracture  Assessment and Plan of Treatment: Follow up with [facility]

## 2021-04-01 RX ADMIN — ATORVASTATIN CALCIUM 20 MILLIGRAM(S): 80 TABLET, FILM COATED ORAL at 21:55

## 2021-04-01 RX ADMIN — Medication 650 MILLIGRAM(S): at 06:13

## 2021-04-01 RX ADMIN — Medication 650 MILLIGRAM(S): at 05:45

## 2021-04-01 RX ADMIN — PREGABALIN 500 MICROGRAM(S): 225 CAPSULE ORAL at 11:15

## 2021-04-01 RX ADMIN — MEMANTINE HYDROCHLORIDE 5 MILLIGRAM(S): 10 TABLET ORAL at 05:45

## 2021-04-01 RX ADMIN — Medication 650 MILLIGRAM(S): at 17:16

## 2021-04-01 RX ADMIN — Medication 1000 UNIT(S): at 17:15

## 2021-04-01 RX ADMIN — SENNA PLUS 2 TABLET(S): 8.6 TABLET ORAL at 21:55

## 2021-04-01 RX ADMIN — Medication 1000 UNIT(S): at 05:45

## 2021-04-01 RX ADMIN — DONEPEZIL HYDROCHLORIDE 10 MILLIGRAM(S): 10 TABLET, FILM COATED ORAL at 21:55

## 2021-04-01 RX ADMIN — Medication 81 MILLIGRAM(S): at 11:15

## 2021-04-01 RX ADMIN — MEMANTINE HYDROCHLORIDE 5 MILLIGRAM(S): 10 TABLET ORAL at 17:16

## 2021-04-01 RX ADMIN — AMLODIPINE BESYLATE 2.5 MILLIGRAM(S): 2.5 TABLET ORAL at 05:45

## 2021-04-01 RX ADMIN — Medication 20 MILLIGRAM(S): at 11:15

## 2021-04-01 NOTE — PROGRESS NOTE ADULT - PROBLEM SELECTOR PLAN 1
Patient with a new pubic rami fx  will continue pain meds and physical therapy as tolerated  appreciate ortho input  Patient scheduled for possible DC to rehab today or tomorrow  continue physical therapy and tylenol for pain
Patient with a new pubic rami fx  will continue pain meds and physical therapy as tolerated  appreciate ortho input  Patient scheduled for possible DC to rehab today   continue physical therapy and tylenol for pain

## 2021-04-01 NOTE — PROGRESS NOTE ADULT - PROBLEM SELECTOR PLAN 3
will continue amlodipine  will monitor BP and adjust meds as needed
will continue amlodipine  will monitor BP and adjust meds as needed

## 2021-04-01 NOTE — PROGRESS NOTE ADULT - PROBLEM SELECTOR PLAN 2
continue namenda and aricept  reorient Patient as needed  haldol for agitation
continue namenda and aricept  reorient Patient as needed  haldol for agitation

## 2021-04-02 ENCOUNTER — TRANSCRIPTION ENCOUNTER (OUTPATIENT)
Age: 86
End: 2021-04-02

## 2021-04-02 VITALS
RESPIRATION RATE: 18 BRPM | HEART RATE: 58 BPM | OXYGEN SATURATION: 97 % | SYSTOLIC BLOOD PRESSURE: 124 MMHG | TEMPERATURE: 98 F | DIASTOLIC BLOOD PRESSURE: 68 MMHG

## 2021-04-02 PROCEDURE — 73562 X-RAY EXAM OF KNEE 3: CPT

## 2021-04-02 PROCEDURE — 72190 X-RAY EXAM OF PELVIS: CPT

## 2021-04-02 PROCEDURE — 84443 ASSAY THYROID STIM HORMONE: CPT

## 2021-04-02 PROCEDURE — 72125 CT NECK SPINE W/O DYE: CPT

## 2021-04-02 PROCEDURE — 73502 X-RAY EXAM HIP UNI 2-3 VIEWS: CPT

## 2021-04-02 PROCEDURE — 87635 SARS-COV-2 COVID-19 AMP PRB: CPT

## 2021-04-02 PROCEDURE — 83735 ASSAY OF MAGNESIUM: CPT

## 2021-04-02 PROCEDURE — 80053 COMPREHEN METABOLIC PANEL: CPT

## 2021-04-02 PROCEDURE — 72170 X-RAY EXAM OF PELVIS: CPT

## 2021-04-02 PROCEDURE — 86769 SARS-COV-2 COVID-19 ANTIBODY: CPT

## 2021-04-02 PROCEDURE — 99285 EMERGENCY DEPT VISIT HI MDM: CPT | Mod: 25

## 2021-04-02 PROCEDURE — 85025 COMPLETE CBC W/AUTO DIFF WBC: CPT

## 2021-04-02 PROCEDURE — 71045 X-RAY EXAM CHEST 1 VIEW: CPT

## 2021-04-02 PROCEDURE — 73030 X-RAY EXAM OF SHOULDER: CPT

## 2021-04-02 PROCEDURE — 97110 THERAPEUTIC EXERCISES: CPT

## 2021-04-02 PROCEDURE — 84484 ASSAY OF TROPONIN QUANT: CPT

## 2021-04-02 PROCEDURE — 97530 THERAPEUTIC ACTIVITIES: CPT

## 2021-04-02 PROCEDURE — 97162 PT EVAL MOD COMPLEX 30 MIN: CPT

## 2021-04-02 PROCEDURE — 80048 BASIC METABOLIC PNL TOTAL CA: CPT

## 2021-04-02 PROCEDURE — 81003 URINALYSIS AUTO W/O SCOPE: CPT

## 2021-04-02 PROCEDURE — 70450 CT HEAD/BRAIN W/O DYE: CPT

## 2021-04-02 RX ORDER — MEMANTINE HYDROCHLORIDE 10 MG/1
1 TABLET ORAL
Qty: 0 | Refills: 0 | DISCHARGE

## 2021-04-02 RX ORDER — MEMANTINE HYDROCHLORIDE 10 MG/1
1 TABLET ORAL
Qty: 0 | Refills: 0 | DISCHARGE
Start: 2021-04-02

## 2021-04-02 RX ADMIN — Medication 1000 UNIT(S): at 05:29

## 2021-04-02 RX ADMIN — PREGABALIN 500 MICROGRAM(S): 225 CAPSULE ORAL at 12:24

## 2021-04-02 RX ADMIN — Medication 81 MILLIGRAM(S): at 12:23

## 2021-04-02 RX ADMIN — MEMANTINE HYDROCHLORIDE 5 MILLIGRAM(S): 10 TABLET ORAL at 05:29

## 2021-04-02 RX ADMIN — AMLODIPINE BESYLATE 2.5 MILLIGRAM(S): 2.5 TABLET ORAL at 05:29

## 2021-04-02 NOTE — PROGRESS NOTE ADULT - SUBJECTIVE AND OBJECTIVE BOX
87 yo F with pmh of dementia, HLD, HTN presenting sp unwitnessed fall with facial injury. At baseline- A&O x 2, presents to ED s/p fall earlier tonight. She has had several falls recently and was recently in rehab, was at home when son heard a fall. She is not on thinners and is at baseline. Mechanism of fall is unknown. Ems reports pt had bad hip pain and received 5 of morhpine x 2. Initially reported to be htn to 200's but otherwise vitals stable although on 02 because pt breathing decreased slightly. pt is axox 2 only because she thought it was 2020, moving all extremity no s/o trauma, Pts son discussed case with ED physician and suggested he can no longer take care of the Patient at home. Would like to look at long term care. Patient seen resting comfortably. scheduled for possible DC to rehab today    MEDICATIONS  (STANDING):  amLODIPine   Tablet 2.5 milliGRAM(s) Oral daily  aspirin  chewable 81 milliGRAM(s) Oral daily  atorvastatin 20 milliGRAM(s) Oral at bedtime  cholecalciferol 1000 Unit(s) Oral two times a day  cyanocobalamin 500 MICROGram(s) Oral daily  donepezil 10 milliGRAM(s) Oral at bedtime  furosemide    Tablet 20 milliGRAM(s) Oral <User Schedule>  memantine 5 milliGRAM(s) Oral two times a day  senna 2 Tablet(s) Oral at bedtime    MEDICATIONS  (PRN):  acetaminophen   Tablet .. 650 milliGRAM(s) Oral every 6 hours PRN Temp greater or equal to 38C (100.4F), Moderate Pain (4 - 6)          VITALS:   T(C): 36.3 (04-01-21 @ 12:11), Max: 36.8 (03-31-21 @ 20:12)  HR: 61 (04-01-21 @ 12:11) (61 - 65)  BP: 120/67 (04-01-21 @ 12:11) (105/57 - 137/71)  RR: 18 (04-01-21 @ 12:11) (18 - 19)  SpO2: 96% (04-01-21 @ 12:11) (95% - 99%)  Wt(kg): --    PHYSICAL EXAM:  GENERAL: NAD, well nourished and conversant  HEAD:  Atraumatic  EYES: EOM, PERRLA, conjunctiva pink and sclera white  ENT: No tonsillar erythema, exudates, or enlargement, moist mucous membranes, good dentition, no lesions  NECK: Supple, No JVD, normal thyroid, carotids with normal upstrokes and no bruits  CHEST/LUNG: Clear to auscultation bilaterally, No rales, rhonchi, wheezing, or rubs  HEART: Regular rate and rhythm, No murmurs, rubs, or gallops  ABDOMEN: Soft, nondistended, no masses, guarding, tenderness or rebound, bowel sounds present  EXTREMITIES:  2+ Peripheral Pulses, No clubbing, cyanosis, or edema.   LYMPH: No lymphadenopathy noted  SKIN: No rashes or lesions  NERVOUS SYSTEM:  confused. Motor Strength 5/5 right upper and right lower.  5/5 left upper and left lower extremities, DTRs 2+ intact and symmetric    LABS:          03-31    139  |  106  |  17  ----------------------------<  77  3.8   |  23  |  0.58    Ca    9.4      31 Mar 2021 06:12            CAPILLARY BLOOD GLUCOSE          RADIOLOGY & ADDITIONAL TESTS:      
89 yo F with pmh of dementia, HLD, HTN presenting sp unwitnessed fall with facial injury. At baseline- A&O x 2, presents to ED s/p fall earlier tonight. She has had several falls recently and was recently in rehab, was at home when son heard a fall. She is not on thinners and is at baseline. Mechanism of fall is unknown. Ems reports pt had bad hip pain and received 5 of morhpine x 2. Initially reported to be htn to 200's but otherwise vitals stable although on 02 because pt breathing decreased slightly. pt is axox 2 only because she thought it was , moving all extremity no s/o trauma, Pts son discussed case with ED physician and suggested he can no longer take care of the Patient at home. Would like to look at long term care. Patient seen resting comfortably.     MEDICATIONS  (STANDING):  amLODIPine   Tablet 2.5 milliGRAM(s) Oral daily  aspirin  chewable 81 milliGRAM(s) Oral daily  atorvastatin 20 milliGRAM(s) Oral at bedtime  cholecalciferol 1000 Unit(s) Oral two times a day  cyanocobalamin 500 MICROGram(s) Oral daily  donepezil 10 milliGRAM(s) Oral at bedtime  furosemide    Tablet 20 milliGRAM(s) Oral <User Schedule>  memantine 5 milliGRAM(s) Oral two times a day  senna 2 Tablet(s) Oral at bedtime    MEDICATIONS  (PRN):  acetaminophen   Tablet .. 650 milliGRAM(s) Oral every 6 hours PRN Temp greater or equal to 38C (100.4F), Moderate Pain (4 - 6)          VITALS:   T(C): 36.5 (21 @ 09:36), Max: 37.6 (21 @ 17:19)  HR: 66 (21 @ 09:36) (60 - 75)  BP: 129/67 (21 @ 09:36) (101/55 - 136/56)  RR: 18 (21 @ 09:36) (17 - 18)  SpO2: 98% (21 @ 09:36) (95% - 99%)  Wt(kg): --    PHYSICAL EXAM:  GENERAL: NAD, well nourished and conversant  HEAD:  Atraumatic  EYES: EOM, PERRLA, conjunctiva pink and sclera white  ENT: No tonsillar erythema, exudates, or enlargement, moist mucous membranes, good dentition, no lesions  NECK: Supple, No JVD, normal thyroid, carotids with normal upstrokes and no bruits  CHEST/LUNG: Clear to auscultation bilaterally, No rales, rhonchi, wheezing, or rubs  HEART: Regular rate and rhythm, No murmurs, rubs, or gallops  ABDOMEN: Soft, nondistended, no masses, guarding, tenderness or rebound, bowel sounds present  EXTREMITIES:  2+ Peripheral Pulses, No clubbing, cyanosis, or edema.   LYMPH: No lymphadenopathy noted  SKIN: No rashes or lesions  NERVOUS SYSTEM:  confused. Motor Strength 5/5 right upper and right lower.  5/5 left upper and left lower extremities, DTRs 2+ intact and symmetric    LABS:        CBC Full  -  ( 30 Mar 2021 01:01 )  WBC Count : 7.70 K/uL  RBC Count : 3.66 M/uL  Hemoglobin : 11.7 g/dL  Hematocrit : 35.7 %  Platelet Count - Automated : 229 K/uL  Mean Cell Volume : 97.5 fl  Mean Cell Hemoglobin : 32.0 pg  Mean Cell Hemoglobin Concentration : 32.8 gm/dL  Auto Neutrophil # : 5.39 K/uL  Auto Lymphocyte # : 1.55 K/uL  Auto Monocyte # : 0.59 K/uL  Auto Eosinophil # : 0.09 K/uL  Auto Basophil # : 0.04 K/uL  Auto Neutrophil % : 70.0 %  Auto Lymphocyte % : 20.1 %  Auto Monocyte % : 7.7 %  Auto Eosinophil % : 1.2 %  Auto Basophil % : 0.5 %        139  |  106  |  17  ----------------------------<  77  3.8   |  23  |  0.58    Ca    9.4      31 Mar 2021 06:12  Mg     2.2         TPro  7.1  /  Alb  3.9  /  TBili  0.5  /  DBili  x   /  AST  28  /  ALT  12  /  AlkPhos  156<H>      LIVER FUNCTIONS - ( 30 Mar 2021 01:01 )  Alb: 3.9 g/dL / Pro: 7.1 g/dL / ALK PHOS: 156 U/L / ALT: 12 U/L / AST: 28 U/L / GGT: x             Urinalysis Basic - ( 30 Mar 2021 02:01 )    Color: Light Yellow / Appearance: Clear / S.011 / pH: x  Gluc: x / Ketone: Negative  / Bili: Negative / Urobili: Negative   Blood: x / Protein: Negative / Nitrite: Negative   Leuk Esterase: Negative / RBC: x / WBC x   Sq Epi: x / Non Sq Epi: x / Bacteria: x      CAPILLARY BLOOD GLUCOSE          RADIOLOGY & ADDITIONAL TESTS:      
  · Provider Specialty	Internal Medicine      · Subjective and Objective:   89 yo F with pmh of dementia, HLD, HTN presenting sp unwitnessed fall with facial injury. At baseline- A&O x 2, presents to ED s/p fall earlier tonight. She has had several falls recently and was recently in rehab, was at home when son heard a fall. She is not on thinners and is at baseline. Mechanism of fall is unknown. Ems reports pt had bad hip pain and received 5 of morhpine x 2. Initially reported to be htn to 200's but otherwise vitals stable although on 02 because pt breathing decreased slightly. pt is axox 2 only because she thought it was 2020, moving all extremity no s/o trauma, Pts son discussed case with ED physician and suggested he can no longer take care of the Patient at home. Would like to look at long term care. Patient seen resting comfortably. scheduled for possible D/C to rehab when bed is available and insurance issues resolved.  Patient not able to care for himself.    PAST MEDICAL & SURGICAL HISTORY:  Hyperlipidemia    Hypertension    Dementia    Lung cancer  left    Lung nodule        right    Prediabetes  no med    S/P lobectomy of lung  s/p FB, mediastinoscopy, LT VATS, COLEMAN lobectomy 8/2009    History of back surgery  1990s    History of cataract surgery  bilateral    Vital Signs Last 24 Hrs  T(C): 36.3 (02 Apr 2021 05:27), Max: 37.4 (01 Apr 2021 21:07)  T(F): 97.3 (02 Apr 2021 05:27), Max: 99.3 (01 Apr 2021 21:07)  HR: 58 (02 Apr 2021 05:27) (58 - 68)  BP: 135/63 (02 Apr 2021 05:27) (110/61 - 135/63)  BP(mean): --  RR: 18 (02 Apr 2021 05:27) (18 - 18)  SpO2: 95% (02 Apr 2021 05:27) (93% - 95%)    PHYSICAL EXAM:      GENERAL: NAD, well nourished and conversant  HEAD:  Atraumatic  EYES: EOM, PERRLA, conjunctiva pink and sclera white  ENT: No tonsillar erythema, exudates, or enlargement, moist mucous membranes, good dentition, no lesions  NECK: Supple, No JVD, normal thyroid, carotids with normal upstrokes and no bruits  CHEST/LUNG: Clear to auscultation bilaterally, No rales, rhonchi, wheezing, or rubs  HEART: Regular rate and rhythm, No murmurs, rubs, or gallops  ABDOMEN: Soft, nondistended, no masses, guarding, tenderness or rebound, bowel sounds present  EXTREMITIES:  2+ Peripheral Pulses, No clubbing, cyanosis, or edema.   LYMPH: No lymphadenopathy noted  SKIN: No rashes or lesions  NERVOUS SYSTEM:  confused. Motor Strength 5/5 right upper and right lower.  5/5 left upper and left lower extremities, DTRs 2+ intact and symmetric      no labs 4/2/21

## 2021-04-02 NOTE — PROGRESS NOTE ADULT - ASSESSMENT
Assessment and Plan:   · Assessment	  87 yo woman presents after a fall at home. Patient has a hx of dementia. found to have a new left acute pubic rami fx. Patien an dfamily unable to care for patient at home  Medically stable to transfer to  long term care facility .   Problem/Plan - 1:  ·  Problem: Fall at home.  Plan: Patient with a new pubic rami fx  will continue pain meds and physical therapy as tolerated  appreciate ortho input  Patient scheduled for possible DC to rehab when bed available and insurance issues resolved   continue physical therapy and tylenol for pain.      Problem/Plan - 2:  ·  Problem: Dementia.  Plan: continue namenda and aricept  reorient Patient as needed  haldol for agitation.      Problem/Plan - 3:  ·  Problem: Hypertension.  Plan: will continue amlodipine  will monitor BP and adjust meds as needed.      Problem/Plan - 4:  ·  Problem: Hyperlipidemia.  Plan: continue cholesterol lowering agent.
87 yo woman presents after a fall at home. Patient has a hx of dementia. found to have an new left acute pubic rami fx. 
89 yo woman presents after a fall at home. Patient has a hx of dementia. found to have an new left acute pubic rami fx.

## 2021-04-02 NOTE — DISCHARGE NOTE NURSING/CASE MANAGEMENT/SOCIAL WORK - PATIENT PORTAL LINK FT
You can access the FollowMyHealth Patient Portal offered by [facility] by registering at the following website: [email or web address]. By joining [employer]’s FollowMyHealth portal, you will also be able to view your health information using other applications (apps) compatible with our system.

## 2021-04-02 NOTE — PROGRESS NOTE ADULT - TIME BILLING
Patient  seen and examined Time spent 40 minutes with  over 50% of time spent on discussing medical issues and coordinating care.
Discussed treatment plan with patient and staff at bedside.
Discussed treatment plan with patient and staff at bedside. Pts son aware of plan

## 2021-04-28 NOTE — ED PROVIDER NOTE - CONTEXT
unknown Nasalis-Muscle-Based Myocutaneous Island Pedicle Flap Text: Using a #15 blade, an incision was made around the donor flap to the level of the nasalis muscle. Wide lateral undermining was then performed in both the subcutaneous plane above the nasalis muscle, and in a submuscular plane just above periosteum. This allowed the formation of a free nasalis muscle axial pedicle (based on the angular artery) which was still attached to the actual cutaneous flap, increasing its mobility and vascular viability. Hemostasis was obtained with pinpoint electrocoagulation. The flap was mobilized into position and the pivotal anchor points positioned and stabilized with buried interrupted sutures. Subcutaneous and dermal tissues were closed in a multilayered fashion with sutures. Tissue redundancies were excised, and the epidermal edges were apposed without significant tension and sutured with sutures.

## 2021-05-01 NOTE — ED ADULT NURSE NOTE - NSFALLRSKPSTHSTOCCUR_ED_ALL_ED
Pt provided COVID-19 Vaccination Record Card, placed at dr. Titi Lorenz folder.      1st dose 02/10/21 Manny Galarza  2nd dose 03/10/21 Manny Galarza Multiple Falls

## 2021-12-25 NOTE — DATA REVIEWED
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

## 2022-03-15 NOTE — ASU PATIENT PROFILE, ADULT - PAIN SCALE PREFERRED, PROFILE
Patient received Lanreotide injection SQ to L. Hip. Tolerated well.    numerical 0-10 acceptable pain level 5/numerical 0-10

## 2022-06-10 NOTE — H&P ADULT - PROBLEM SELECTOR PROBLEM 2
PAST MEDICAL HISTORY:  Enlarged tonsils and adenoids     MUSTAPHA (obstructive sleep apnea)     TTN (transient tachypnea of )      Dementia

## 2022-11-16 NOTE — H&P ADULT - NSCORESITESY/N_GEN_A_CORE_RD
Met with patient and spouse to review Team Conference of yesterday. Goal is home;  DC on Friday, 11-. DME recs:    1/2 bed rail, Shower chair with a back, TSF, bars in shower, long shoe horn. Home care orders for sn/pt/ot. Requested Family education. Spouse can come in on Thursday at 1pm.  Reviewed home care agency list including CMS star ratings. She wants to use [employer] as she used to work there. Referral initiated. They want to use the extra wh walker that was a relative's for her TSF. She will need a new wh walker. Informed Dr Jazmyn Jacobo. Referral made to [facility] .   [place], [place]     Case Management   [phone]    11/16/2022  3:00 PM No

## 2022-12-12 NOTE — H&P PST ADULT - NEGATIVE PSYCHIATRIC SYMPTOMS
Brief Postoperative Note      Patient: Marii Sesay  YOB: 1968  MRN: [id number]    Date of Procedure: 12/12/2022    Pre-Op Diagnosis: Painful orthopaedic hardware [facility]) Anatoly Zamarripa  Left foot pain [M79.672]    Post-Op Diagnosis: Same       Procedure(s):  REMOVAL OF HARDWARE LEFT FOOT (C-ARM,POWER,KURT AND HARDWARE REMOVAL KIT)    Surgeon(s):  Maurice Rabago DPM    Assistant:  Resident: Vince Funez MD    Anesthesia: General    Estimated Blood Loss (mL): Minimal    Complications: None    Specimens:   ID Type Source Tests Collected by Time Destination   A : hardware with tissue left foot Tissue Tissue SURGICAL PATHOLOGY Maurice Hull DPM 12/12/2022 1438        Implants:  * No implants in log *      Drains: * No LDAs found *    Findings: Successful removal of hardware from first MTPJ    Electronically signed by Tabitha Mckeon DPM on 12/12/2022 at 3:04 PM no suicidal ideation/no depression/no anxiety

## 2023-01-01 NOTE — PROGRESS NOTE ADULT - PROVIDER SPECIALTY LIST ADULT
Orthopedics [Reports Changes In Medication (including OTC)] : Patient reports no changes in medication

## 2023-02-28 NOTE — ED ADULT NURSE NOTE - NSFALLRSKOUTCOME_ED_ALL_ED
Generally abscesses need to be drained, but we decided to try antibiotics in your case  Since the fluid collection is quite deep. return to the emergency room if the redness and swelling is worse after 24 to 48 hours   Fall with Harm Risk

## 2023-06-10 NOTE — DISCHARGE NOTE NURSING/CASE MANAGEMENT/SOCIAL WORK - NSDCPELOVENOXDIET_GEN_ALL_CORE
I attest my time as attending is greater than 50% of the total combined time spent on qualifying patient care activities by the PA/NP and attending. Eat healthy foods you enjoy. Enoxaparin/Lovenox DOES NOT have a special diet. Limit your alcohol intake.

## 2023-06-17 NOTE — ED ADULT TRIAGE NOTE - BSA (M2)
Pharmacy - Transfer Medication Reconciliation     The patient's transfer medication orders have been compared to the medication administration record and to the Prior to Admissions Medications list - any noted discrepancies were resolved with the MD.     Thank you. Pharmacy will continue to monitor.     Wicho Davila RP ....................  6/17/2023   1:15 PM    
1.82

## 2023-09-10 NOTE — ASU PREOP CHECKLIST - TEMPERATURE IN CELSIUS (DEGREES C)
36.5

## 2023-11-01 NOTE — PROGRESS NOTE ADULT - PROBLEM SELECTOR PLAN 2
Attempted to contact patient in order to complete pre assessment questions.     No answer. Left message to return call to [phone] option 4      Procedure details:    Patient scheduled for Colonoscopy  on 11/15/23.     Arrival time: 0900. Procedure time 1000    Pre op exam needed? N/A    [facility]; [address] [place] [address], [address] Floor, [place] [address]    Sedation type: MAC    Indication for procedure: liver transplant      Chart review:     Electronic implanted devices? No    Recent diagnosis of diverticulitis within the last 6 weeks? No    Diabetic? No      Medication review:    Anticoagulants? No    NSAIDS? No NSAID medications per patient's medication list.  RN will verify with pre-assessment call.    Other medication HOLDING recommendations:  Iron supplements: HOLD 7 days before procedure.  Psyllium      Prep for procedure:     Bowel prep recommendation: Standard Golytely   Due to: CKD noted.     Prep instructions sent via [employer]. Bowel prep script sent to    [id number] - [place], [place] - [address] [facility]      Chrystal Theodore RN  Endoscopy Procedure Pre Assessment RN           
will continue cipro for suggested Urinary tract infection  would complete a 5 day course  cultures are positive for ecoli sensitivities pending  will adjust abx as needed
will continue cipro for suggested Urinary tract infection  Cultures are pending  will adjust abx as needed
will continue cipro for suggested Urinary tract infection  would complete a 5 day course  cultures are positive for ecoli sensitivities pending  will adjust abx as needed

## 2023-11-05 NOTE — PROGRESS NOTE ADULT - ASSESSMENT
ASSESSMENT:  87 yr old female with Lt IT femur fx, now s/p Lt hip IMN 4/23, admitted to SICU for monitoring of hypotension and AMS postoperatively    PLAN:    Neurologic:   - C/w Tylenol, prn Oxy for Pain control  - melatonin qhs    Respiratory:   - Monitor respiratory status  - OOBTC daily, encourage use of incentive spirometer    Cardiovascular:   - HDS off pressors on arrival to SICU  - Monitor vitals per routine  - C/w home atorvastatin  - Restart home anti-HTN meds (Norvasc, Losartan) once persistently stable off pressors    Gastrointestinal/Nutrition:   - C/w regular diet as tolerated  - Monitor bowel function    Renal/Genitourinary:   - Monitor urine output  - Trend electrolytes, replete as needed    Hematologic:   - Holding DVT ppx for Hct drop, hematoma on surgical site  - Trend H/H, transfuse as needed    Infectious Disease:   - s/p Ancef x3 perioperatively  - Monitor for fevers  - Monitor WBC    Endocrine:   - Monitor BMP glucose    Disposition:   Continued critical care management    Juvencio Renae, PGY 2  [id number] 05-Nov-2023 06:18

## 2024-05-29 NOTE — ED ADULT NURSE NOTE - VOIDING
Rx Refill Note  Requested Prescriptions     Pending Prescriptions Disp Refills    omeprazole (priLOSEC) 40 MG capsule [Pharmacy Med Name: OMEPRAZOLE 40MG CAPSULES] 90 capsule 1     Sig: TAKE 1 CAPSULE BY MOUTH DAILY BEFORE SUPPER      Last office visit with prescribing clinician: 3/5/2024   Last telemedicine visit with prescribing clinician: Visit date not found   Next office visit with prescribing clinician: 6/5/2024                         Would you like a call back once the refill request has been completed: [] Yes [] No    If the office needs to give you a call back, can they leave a voicemail: [] Yes [] No    Horacio Fernández CMA/LMR  05/29/24, 08:07 EDT  
without difficulty

## 2024-08-15 NOTE — PHYSICAL THERAPY INITIAL EVALUATION ADULT - GENERAL OBSERVATIONS, REHAB EVAL
Addended by: JOSE ALBERTO CABA on: 8/15/2024 03:51 PM     Modules accepted: Orders    
Pt. received sitting in chair watching TV

## 2024-08-23 NOTE — PATIENT PROFILE ADULT - NSASFALLNEEDSASSIST_GEN_A_NUR
yes Include Z78.9 (Other Specified Conditions Influencing Health Status) As An Associated Diagnosis?: No Anesthesia Type: 1% lidocaine with epinephrine Medical Necessity Clause: This procedure was medically necessary because the lesions that were treated were: Add Associated Diagnoses If Applicable When Selecting Medical Necessity: Yes Anesthesia Volume In Cc: 3 Consent: Written consent obtained and the risks of skin tag removal was reviewed with the patient including but not limited to bleeding, pigmentary change, infection, pain, and remote possibility of scarring. Medical Necessity Information: It is in your best interest to select a reason for this procedure from the list below. All of these items fulfill various CMS LCD requirements except the new and changing color options. Detail Level: Detailed

## 2024-10-21 NOTE — PATIENT PROFILE ADULT. - SPIRITUAL CULTURAL, RELIGIOUS PRACTICES/VALUES, PROFILE
Patient called for assistance to restroom, this nurse assisted patient to restroom x 1.  Patient's daughter at bedside.  Patient assisted to bed x 1, NPO, bed alarm on and sounded when prompted.             None